# Patient Record
Sex: MALE | Race: WHITE | NOT HISPANIC OR LATINO | Employment: OTHER | URBAN - METROPOLITAN AREA
[De-identification: names, ages, dates, MRNs, and addresses within clinical notes are randomized per-mention and may not be internally consistent; named-entity substitution may affect disease eponyms.]

---

## 2018-03-29 ENCOUNTER — OFFICE VISIT CONVERTED (OUTPATIENT)
Dept: FAMILY MEDICINE CLINIC | Facility: CLINIC | Age: 68
End: 2018-03-29
Attending: FAMILY MEDICINE

## 2018-03-29 ENCOUNTER — CONVERSION ENCOUNTER (OUTPATIENT)
Dept: FAMILY MEDICINE CLINIC | Facility: CLINIC | Age: 68
End: 2018-03-29

## 2018-09-27 ENCOUNTER — CONVERSION ENCOUNTER (OUTPATIENT)
Dept: FAMILY MEDICINE CLINIC | Facility: CLINIC | Age: 68
End: 2018-09-27

## 2018-09-27 ENCOUNTER — OFFICE VISIT CONVERTED (OUTPATIENT)
Dept: FAMILY MEDICINE CLINIC | Facility: CLINIC | Age: 68
End: 2018-09-27
Attending: FAMILY MEDICINE

## 2019-02-18 ENCOUNTER — HOSPITAL ENCOUNTER (OUTPATIENT)
Dept: FAMILY MEDICINE CLINIC | Facility: CLINIC | Age: 69
Discharge: HOME | End: 2019-02-18
Attending: FAMILY MEDICINE

## 2019-02-18 LAB
CHOLEST SERPL-MCNC: 150 MG/DL (ref 107–200)
CHOLEST/HDLC SERPL: 3.5 {RATIO} (ref 3–6)
HDLC SERPL-MCNC: 43 MG/DL (ref 40–60)
LDLC SERPL CALC-MCNC: 85 MG/DL (ref 70–100)
TRIGL SERPL-MCNC: 109 MG/DL (ref 40–150)
VLDLC SERPL-MCNC: 22 MG/DL (ref 5–37)

## 2019-07-16 ENCOUNTER — CONVERSION ENCOUNTER (OUTPATIENT)
Dept: FAMILY MEDICINE CLINIC | Facility: CLINIC | Age: 69
End: 2019-07-16

## 2019-07-16 ENCOUNTER — HOSPITAL ENCOUNTER (OUTPATIENT)
Dept: FAMILY MEDICINE CLINIC | Facility: CLINIC | Age: 69
Discharge: HOME | End: 2019-07-16
Attending: FAMILY MEDICINE

## 2019-07-16 ENCOUNTER — OFFICE VISIT CONVERTED (OUTPATIENT)
Dept: FAMILY MEDICINE CLINIC | Facility: CLINIC | Age: 69
End: 2019-07-16
Attending: FAMILY MEDICINE

## 2019-07-16 LAB
FOLATE SERPL-MCNC: 11.5 NG/ML (ref 4.8–20)
TSH SERPL-ACNC: 2.76 M[IU]/L (ref 0.27–4.2)
VIT B12 SERPL-MCNC: 482 PG/ML (ref 211–911)

## 2019-08-01 ENCOUNTER — HOSPITAL ENCOUNTER (OUTPATIENT)
Dept: MRI IMAGING | Facility: HOSPITAL | Age: 69
Discharge: HOME | End: 2019-08-01
Attending: FAMILY MEDICINE

## 2019-08-26 ENCOUNTER — CONVERSION ENCOUNTER (OUTPATIENT)
Dept: FAMILY MEDICINE CLINIC | Facility: CLINIC | Age: 69
End: 2019-08-26

## 2019-08-26 ENCOUNTER — OFFICE VISIT CONVERTED (OUTPATIENT)
Dept: FAMILY MEDICINE CLINIC | Facility: CLINIC | Age: 69
End: 2019-08-26
Attending: FAMILY MEDICINE

## 2019-10-03 ENCOUNTER — HOSPITAL ENCOUNTER (OUTPATIENT)
Dept: URGENT CARE | Facility: CLINIC | Age: 69
Discharge: HOME | End: 2019-10-03

## 2019-10-07 ENCOUNTER — OFFICE VISIT CONVERTED (OUTPATIENT)
Dept: FAMILY MEDICINE CLINIC | Facility: CLINIC | Age: 69
End: 2019-10-07
Attending: FAMILY MEDICINE

## 2019-12-05 ENCOUNTER — HOSPITAL ENCOUNTER (OUTPATIENT)
Dept: FAMILY MEDICINE CLINIC | Facility: CLINIC | Age: 69
Discharge: HOME | End: 2019-12-05
Attending: FAMILY MEDICINE

## 2019-12-05 ENCOUNTER — OFFICE VISIT CONVERTED (OUTPATIENT)
Dept: FAMILY MEDICINE CLINIC | Facility: CLINIC | Age: 69
End: 2019-12-05
Attending: FAMILY MEDICINE

## 2019-12-05 LAB — PSA SERPL-MCNC: 3.26 NG/ML (ref 0–4)

## 2019-12-06 LAB
CONV HEPATITIS C AB WITH REFLEX TO CONFIRMATION: <0.1 S/CO RATIO (ref 0–0.9)
CONV HEPATITIS COMMENT: NORMAL

## 2020-01-03 ENCOUNTER — HOSPITAL ENCOUNTER (OUTPATIENT)
Dept: GENERAL RADIOLOGY | Facility: HOSPITAL | Age: 70
Discharge: HOME | End: 2020-01-03
Attending: NURSE PRACTITIONER

## 2020-01-03 ENCOUNTER — OFFICE VISIT CONVERTED (OUTPATIENT)
Dept: FAMILY MEDICINE CLINIC | Facility: CLINIC | Age: 70
End: 2020-01-03
Attending: NURSE PRACTITIONER

## 2020-03-02 ENCOUNTER — CONVERSION ENCOUNTER (OUTPATIENT)
Dept: FAMILY MEDICINE CLINIC | Facility: CLINIC | Age: 70
End: 2020-03-02

## 2020-03-02 ENCOUNTER — OFFICE VISIT CONVERTED (OUTPATIENT)
Dept: FAMILY MEDICINE CLINIC | Facility: CLINIC | Age: 70
End: 2020-03-02
Attending: FAMILY MEDICINE

## 2020-10-06 ENCOUNTER — OFFICE VISIT CONVERTED (OUTPATIENT)
Dept: FAMILY MEDICINE CLINIC | Facility: CLINIC | Age: 70
End: 2020-10-06
Attending: FAMILY MEDICINE

## 2020-10-06 ENCOUNTER — CONVERSION ENCOUNTER (OUTPATIENT)
Dept: FAMILY MEDICINE CLINIC | Facility: CLINIC | Age: 70
End: 2020-10-06

## 2021-05-13 NOTE — PROGRESS NOTES
Progress Note      Patient Name: Lj Crenshaw   Patient ID: [id number]   Sex: Male   YOB: 1950    Primary Care Provider: Scott Hernandez III MD    Visit Date: October 6, 2020    Provider: Scott Hernandez III MD   Location: [facility]   Location Address: [address] [place] [address]  [id number]   Location Phone: [phone]          Chief Complaint  · right foot swelling on and off      History Of Present Illness  Lj Crenshaw is a 70 year old /White male who presents for evaluation and treatment of: right foot swelling on and off, has occasional sore on the side of the foot if he walks a lot. Pt already has a right foot drop and weakness in the right lower leg so he doesn't have good use of the right foot since his knee replacement.      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  Has varicose veins on both sides and has some swelling there is no pain in the calves.    Review of systems     musculoskeletal right foot swelling.  But is been doing the surgery.  He an ankle brace may help.  But needs to be a strep on.  Not he is already tried a couple it did not work.  Could use compression hose.  But he is he is going to continue to have swelling some swelling in his ankles.  Cardiovascular no chest pain the patient is not taking statin needs to take the Crestor decrease cardiac heart attack and stroke.  Patient still reluctant to do that.  I did not really bother him much.  States states he may have felt lightheaded while he was on it but that was not the statin doing that.    Physical exam     blood pressure is 132/70 temp 97.8 heart rate 93 pulse ox 98 weight is 261 this is a 19 pound weight loss.  General patient appears in good health looks better than he has before.  Cardiovascular 2/6 systolic ejection murmur was checked out for that back in 2015 with an echo was negative.     Respiratory no increased work of breathing lungs clinical bilaterally no rales no rhonchi no wheezes  Feet yes patient has a right sided foot drop.  Only has a little bit more edema than the other side does have varicosities no tenderness whatsoever in either calf.  Patient uses his cane.  And uses his cane to his advantage.     Assessment     #1 right foot swollen due to right leg and right peroneal nerve damage with a foot drop.   #2 patient is lost weight with the keto diet but is not taking his Crestor needs to start taking it.    #3 refuses flu shot.    Plan     recheck as needed ankle brace Velcro on.  Can restart his Crestor he has that at home 40 million       Past Medical History  Disease Name Date Onset Notes   Advance directive declined by patient 03/02/2020 --    Bilateral Lower Extremity Pedal Edema, right &gt; left 07/22/2016 --    Essential hypertension 09/27/2018 --    Fatigue 09/27/2018 --    High cholesterol --  --    Limb Swelling --  --    Refused influenza vaccine 03/02/2020 --    Screening for colon cancer 2013 Per Dr. Vigil, Diverticulitis, repeat in 10 yrs         Past Surgical History  Procedure Name Date Notes   Colonoscopy 2013 Per Dr. Vigil, Diverticulitis, repeat in 10 yrs   EYE SURGERY --  --    Joint Surgery --  --    Knee repair 2011 Dr Sean   Knee surgery 2011 --          Medication List  Name Date Started Instructions   Crestor 40 mg oral tablet 09/27/2018 take 1 tablet (40 mg) by oral route once daily for 90 days   lisinopril-hydrochlorothiazide 10-12.5 mg oral tablet 01/30/2020 take 1 tablet by oral route once daily for 90 days   Omega-3 350 mg-235 mg- 90 mg-597 mg oral capsule,delayed release(DR/EC)  --    promethazine-DM 6.25-15 mg/5 mL oral syrup 01/03/2020 take 5 milliliters by oral route every 4 hours         Allergy List  Allergen Name Date Reaction Notes   NO KNOWN DRUG ALLERGIES --  --  --        Allergies Reconciled  Family Medical History  Disease Name Relative/Age  "Notes   Stroke Father/   Father   Heart Disease Brother/   Brother   Cancer, Unspecified Mother/  Sister/   Mother; Sister   Osteoporosis Mother/   Mother         Social History  Finding Status Start/Stop Quantity Notes   Active but no formal exercise --  --/-- --  --    Advance directive declined by patient --  --/-- --  --    Alcohol Use Current some day --/-- --  occasionally drinks, has been drinking for 31 or more years    --  --/-- --  --    Recreational Drug Use Never --/-- --  no   Tobacco Former 31/64 1 ppd 06/28/2017 - smokes 1ppd          Immunizations  NameDate Admin Mfg Trade Name Lot Number Route Inj VIS Given VIS Publication   Hepatitis A10/16/2018 NE Not Entered  NE NE 11/29/2018    Comments:    InfluenzaRefused 10/07/2019 NE Not Entered  NE NE     Comments: Patient refused   Wmuugmage7143/19/2018 NE Not Entered  NE NE 11/29/2018    Comments:    Prevnar 1308/31/2017 [facility] PREVNAR 13 [id number] IM LD 08/31/2017 11/05/2015   Comments:    Ogfokurb55/06/2019 NE Shingrix  IM RA 08/09/2019    Comments: given at [facility]   Njzqmklz37/30/2019 NE Not Entered  IM LA 04/02/2019    Comments: shringrix given at [facility]   Tdap01/10/2013 NE Not Entered  NE NE 11/29/2018    Comments:          Review of Systems  · Constitutional  o * See HPI  · Eyes  o * See HPI  · HENT  o * See HPI  · Breasts  o * See HPI  · Cardiovascular  o * See HPI  · Respiratory  o * See HPI  · Gastrointestinal  o * See HPI  · Genitourinary  o * See HPI  · Integument  o * See HPI  · Neurologic  o * See HPI  · Musculoskeletal  o * See HPI  · Endocrine  o * See HPI  · Psychiatric  o * See HPI  · Heme-Lymph  o * See HPI  · Allergic-Immunologic  o * See HPI      Vitals  Date Time BP Position Site L\R Cuff Size HR RR TEMP (F) WT  HT  BMI kg/m2 BSA m2 O2 Sat FR L/min FiO2 HC       10/06/2020 02:18 /70 Sitting    93 - R  97.8 261lbs 0oz 6'  6\" 30.16 2.55 98 %  21%              Assessment  · Pedal edema     782.3/R60.0  · Bilateral Lower Extremity " Pedal Edema, right > left     782.3/R60.0  · Refused influenza vaccine     V64.06/Z28.21  · Varicose vein of leg     454.9/I83.90  · Chronic Right foot drop     736.79/M21.371  · Statin declined     V64.2/Z53.20  · Injury of right peroneal nerve, subsequent encounter       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   o Take all medications as prescribed/directed.  o Patient instructed/educated on their diet and exercise program.  o Patient was educated/instructed on their diagnosis, treatment and medications prior to discharge from the clinic today.  o Bring all medicines with their bottles to each office visit.  o Time spent with the patient was 20 minutes, more than 50% face to face.  o Chronic conditions reviewed and taken into consideration for today's treatment plan.  o Flu vaccine declined.  o Discussed Covid-19 precautions including, but not limited to, social distancing, avoid touching your face, and hand washing.             Electronically Signed by: Charmaine Holman, -Author on October 6, 2020 03:07:24 PM  Electronically Co-signed by: Scott Hernandez III MD -Reviewer on October 6, 2020 03:17:56 PM

## 2021-05-14 VITALS
DIASTOLIC BLOOD PRESSURE: 70 MMHG | SYSTOLIC BLOOD PRESSURE: 132 MMHG | HEART RATE: 93 BPM | HEIGHT: 78 IN | WEIGHT: 261 LBS | TEMPERATURE: 97.8 F | OXYGEN SATURATION: 98 % | BODY MASS INDEX: 30.2 KG/M2

## 2021-05-15 VITALS
DIASTOLIC BLOOD PRESSURE: 80 MMHG | WEIGHT: 280 LBS | SYSTOLIC BLOOD PRESSURE: 142 MMHG | HEIGHT: 78 IN | BODY MASS INDEX: 32.4 KG/M2

## 2021-05-15 VITALS
SYSTOLIC BLOOD PRESSURE: 143 MMHG | WEIGHT: 279 LBS | TEMPERATURE: 98 F | RESPIRATION RATE: 19 BRPM | HEART RATE: 93 BPM | BODY MASS INDEX: 32.28 KG/M2 | OXYGEN SATURATION: 95 % | DIASTOLIC BLOOD PRESSURE: 78 MMHG | HEIGHT: 78 IN

## 2021-05-15 VITALS
TEMPERATURE: 98.4 F | DIASTOLIC BLOOD PRESSURE: 80 MMHG | HEIGHT: 78 IN | SYSTOLIC BLOOD PRESSURE: 134 MMHG | HEART RATE: 86 BPM | RESPIRATION RATE: 12 BRPM | OXYGEN SATURATION: 95 %

## 2021-05-15 VITALS
HEIGHT: 78 IN | SYSTOLIC BLOOD PRESSURE: 120 MMHG | BODY MASS INDEX: 31.93 KG/M2 | WEIGHT: 276 LBS | DIASTOLIC BLOOD PRESSURE: 80 MMHG

## 2021-05-15 VITALS
WEIGHT: 280 LBS | HEIGHT: 78 IN | TEMPERATURE: 98.4 F | DIASTOLIC BLOOD PRESSURE: 70 MMHG | SYSTOLIC BLOOD PRESSURE: 112 MMHG | BODY MASS INDEX: 32.4 KG/M2

## 2021-05-15 VITALS
SYSTOLIC BLOOD PRESSURE: 130 MMHG | BODY MASS INDEX: 32.4 KG/M2 | DIASTOLIC BLOOD PRESSURE: 72 MMHG | HEIGHT: 78 IN | WEIGHT: 280 LBS

## 2021-05-16 VITALS
DIASTOLIC BLOOD PRESSURE: 80 MMHG | HEIGHT: 78 IN | BODY MASS INDEX: 33.78 KG/M2 | WEIGHT: 292 LBS | SYSTOLIC BLOOD PRESSURE: 150 MMHG

## 2021-05-16 VITALS
DIASTOLIC BLOOD PRESSURE: 82 MMHG | HEART RATE: 80 BPM | OXYGEN SATURATION: 100 % | WEIGHT: 277 LBS | HEIGHT: 78 IN | SYSTOLIC BLOOD PRESSURE: 138 MMHG | RESPIRATION RATE: 12 BRPM | BODY MASS INDEX: 32.05 KG/M2

## 2021-05-27 ENCOUNTER — OFFICE VISIT (OUTPATIENT)
Dept: ORTHOPEDIC SURGERY | Facility: CLINIC | Age: 71
End: 2021-05-27

## 2021-05-27 VITALS — HEIGHT: 78 IN | BODY MASS INDEX: 27.19 KG/M2 | WEIGHT: 235 LBS

## 2021-05-27 DIAGNOSIS — Z96.651 STATUS POST RIGHT KNEE REPLACEMENT: ICD-10-CM

## 2021-05-27 DIAGNOSIS — R52 PAIN: Primary | ICD-10-CM

## 2021-05-27 PROCEDURE — 99203 OFFICE O/P NEW LOW 30 MIN: CPT | Performed by: ORTHOPAEDIC SURGERY

## 2021-05-27 PROCEDURE — 73562 X-RAY EXAM OF KNEE 3: CPT | Performed by: ORTHOPAEDIC SURGERY

## 2021-05-27 PROCEDURE — 73502 X-RAY EXAM HIP UNI 2-3 VIEWS: CPT | Performed by: ORTHOPAEDIC SURGERY

## 2021-05-27 RX ORDER — PREDNISOLONE ACETATE 10 MG/ML
SUSPENSION/ DROPS OPHTHALMIC
COMMUNITY
Start: 2021-02-23 | End: 2021-11-16

## 2021-05-27 RX ORDER — LISINOPRIL AND HYDROCHLOROTHIAZIDE 12.5; 1 MG/1; MG/1
TABLET ORAL
COMMUNITY
Start: 2021-04-21 | End: 2021-07-26

## 2021-05-27 NOTE — PROGRESS NOTES
"willowPatient: Lj Crenshaw  YOB: 1950 71 y.o. male  Medical Record Number: [id number]    Chief Complaints:   Chief Complaint   Patient presents with   • Right Hip - Establish Care, Pain   • Right Knee - Establish Care, Pain       History of Present Illness:Lj Crenshaw is a 71 y.o. male who presents with complaints of right greater than left leg weakness instability difficulty with balance and coordination.  He reports having had a right total knee replacement in 2010 by Dr. Rene Hester in [facility].  He states over the years he developed right foot drop.  He now uses a walker even around the house and has difficulty with balance and coordination with frequent falls.  He does not have much in the way of pain in the knee or the right hip.  He is here to have his knee and hip evaluated to make sure they are not the source of his balance issues and falls.    Allergies: No Known Allergies    Medications:   Current Outpatient Medications   Medication Sig Dispense Refill   • lisinopril-hydrochlorothiazide (PRINZIDE,ZESTORETIC) 10-12.5 MG per tablet      • Olopatadine HCl (PATADAY OP) Apply  to eye(s) as directed by provider.     • bisoprolol (ZEBETA) 5 MG tablet Take 1 tablet by mouth Daily. 30 tablet 6   • Omega-3 Fatty Acids (FISH OIL) 1000 MG capsule capsule Take  by mouth Daily With Breakfast.     • prednisoLONE acetate (PRED FORTE) 1 % ophthalmic suspension        No current facility-administered medications for this visit.         The following portions of the patient's history were reviewed and updated as appropriate: allergies, current medications, past family history, past medical history, past social history, past surgical history and problem list.    Review of Systems:   A 14 point review of systems was performed. All systems negative except pertinent positives/negative listed in HPI above    Physical Exam:   Vitals:    05/27/21 0824   Weight: 107 kg (235 lb)   Height: 198.1 cm (78\") "       General: A and O x 3, ASA, NAD    SCLERA:    Normal    DENTITION:   Normal   He walks with an ataxic gait with use of a walker.  His motor testing demonstrates diffuse lower extremity weakness right greater than left.  He has marked weakness of the right foot dorsiflexors more so than left.  Also considerable weakness of hip flexor and extensors at the knee.  He is intact to light touch.  There is no evidence of varus valgus or anterior posterior instability of the right knee replacement.  He there is a well-healed midline incision there is no evidence of joint effusion.  The hip shows no pain with flexion internal rotation of the negative Stinchfield test although it is quite weak.  There is no irritability of the hip with motion.  There is no tenderness about the greater trochanters.      Radiology:  Xrays 3views right knee (ap,lateral, sunrise) were ordered and reviewed for evaluation of knee pain demonstrating a well positioned knee replacement without evidence of wear, loosening or osteolysis.  There are no previous films for comparison.    X-rays 2 views the right hip AP lateral were ordered and reviewed which show no significant arthritic change.  There is extensive degenerative change and degenerative scoliosis of the lumbar spine visualized on the AP pelvis view.  There are no previous films for x-ray.     Assessment/Plan: Ataxia and right greater than left leg weakness I do not think as a result of a knee or hip problem.  I think there is a more proximal source likely neuromuscular in nature.  He has seen a neurologist in a town and has had some work-up but he is not sure what.  I do not want a repeat create before so he and his daughter go to try them find out what studies have been performed and have given them some names for neurologist in town.  I be happy to see him back at any point in the future should he have pain of knee or hip.      Mal Quigley MD  5/27/2021

## 2021-07-26 RX ORDER — LISINOPRIL AND HYDROCHLOROTHIAZIDE 12.5; 1 MG/1; MG/1
TABLET ORAL
Qty: 90 TABLET | Refills: 0 | Status: SHIPPED | OUTPATIENT
Start: 2021-07-26 | End: 2021-10-26

## 2021-10-26 RX ORDER — LISINOPRIL AND HYDROCHLOROTHIAZIDE 12.5; 1 MG/1; MG/1
TABLET ORAL
Qty: 30 TABLET | Refills: 0 | Status: SHIPPED | OUTPATIENT
Start: 2021-10-26 | End: 2021-11-16 | Stop reason: SDUPTHER

## 2021-11-16 ENCOUNTER — TELEPHONE (OUTPATIENT)
Dept: FAMILY MEDICINE CLINIC | Facility: CLINIC | Age: 71
End: 2021-11-16

## 2021-11-16 ENCOUNTER — OFFICE VISIT (OUTPATIENT)
Dept: FAMILY MEDICINE CLINIC | Facility: CLINIC | Age: 71
End: 2021-11-16

## 2021-11-16 VITALS
DIASTOLIC BLOOD PRESSURE: 74 MMHG | OXYGEN SATURATION: 96 % | BODY MASS INDEX: 30.08 KG/M2 | HEIGHT: 78 IN | TEMPERATURE: 97.7 F | WEIGHT: 260 LBS | HEART RATE: 89 BPM | SYSTOLIC BLOOD PRESSURE: 118 MMHG

## 2021-11-16 DIAGNOSIS — I10 ESSENTIAL HYPERTENSION: ICD-10-CM

## 2021-11-16 DIAGNOSIS — E78.2 MIXED HYPERLIPIDEMIA: ICD-10-CM

## 2021-11-16 DIAGNOSIS — Z79.899 MEDICATION MANAGEMENT: ICD-10-CM

## 2021-11-16 DIAGNOSIS — Z13.6 ENCOUNTER FOR LIPID SCREENING FOR CARDIOVASCULAR DISEASE: ICD-10-CM

## 2021-11-16 DIAGNOSIS — Z13.220 ENCOUNTER FOR LIPID SCREENING FOR CARDIOVASCULAR DISEASE: ICD-10-CM

## 2021-11-16 DIAGNOSIS — Z11.59 NEED FOR HEPATITIS C SCREENING TEST: ICD-10-CM

## 2021-11-16 DIAGNOSIS — Z00.00 ROUTINE ADULT HEALTH MAINTENANCE: Primary | ICD-10-CM

## 2021-11-16 DIAGNOSIS — Z12.5 SCREENING PSA (PROSTATE SPECIFIC ANTIGEN): ICD-10-CM

## 2021-11-16 PROBLEM — M54.10 POLYRADICULOPATHY: Status: ACTIVE | Noted: 2021-10-20

## 2021-11-16 LAB
BILIRUB BLD-MCNC: NEGATIVE MG/DL
CHOLEST SERPL-MCNC: 285 MG/DL (ref 0–200)
CLARITY, POC: CLEAR
COLOR UR: YELLOW
EXPIRATION DATE: ABNORMAL
GLUCOSE UR STRIP-MCNC: NEGATIVE MG/DL
HCV AB SER DONR QL: NORMAL
HDLC SERPL-MCNC: 51 MG/DL (ref 40–60)
KETONES UR QL: NEGATIVE
LDLC SERPL CALC-MCNC: 203 MG/DL (ref 0–100)
LDLC/HDLC SERPL: 3.95 {RATIO}
LEUKOCYTE EST, POC: ABNORMAL
Lab: ABNORMAL
NITRITE UR-MCNC: NEGATIVE MG/ML
PH UR: 5.5 [PH] (ref 5–8)
PROT UR STRIP-MCNC: NEGATIVE MG/DL
PSA SERPL-MCNC: 3.93 NG/ML (ref 0–4)
RBC # UR STRIP: NEGATIVE /UL
SP GR UR: 1.01 (ref 1–1.03)
TRIGL SERPL-MCNC: 163 MG/DL (ref 0–150)
UROBILINOGEN UR QL: NORMAL
VLDLC SERPL-MCNC: 31 MG/DL (ref 5–40)

## 2021-11-16 PROCEDURE — 86803 HEPATITIS C AB TEST: CPT | Performed by: FAMILY MEDICINE

## 2021-11-16 PROCEDURE — 80061 LIPID PANEL: CPT | Performed by: FAMILY MEDICINE

## 2021-11-16 PROCEDURE — 99214 OFFICE O/P EST MOD 30 MIN: CPT | Performed by: FAMILY MEDICINE

## 2021-11-16 PROCEDURE — 81003 URINALYSIS AUTO W/O SCOPE: CPT | Performed by: FAMILY MEDICINE

## 2021-11-16 PROCEDURE — 36415 COLL VENOUS BLD VENIPUNCTURE: CPT | Performed by: FAMILY MEDICINE

## 2021-11-16 PROCEDURE — G0103 PSA SCREENING: HCPCS | Performed by: FAMILY MEDICINE

## 2021-11-16 RX ORDER — LISINOPRIL AND HYDROCHLOROTHIAZIDE 12.5; 1 MG/1; MG/1
1 TABLET ORAL DAILY
Qty: 90 TABLET | Refills: 3 | Status: SHIPPED | OUTPATIENT
Start: 2021-11-16 | End: 2021-11-29

## 2021-11-16 RX ORDER — CYANOCOBALAMIN 1000 UG/ML
INJECTION, SOLUTION INTRAMUSCULAR; SUBCUTANEOUS
COMMUNITY
Start: 2021-09-08

## 2021-11-16 NOTE — PROGRESS NOTES
Chief Complaint  Med Management (yearly routine check up and labs)    SUBJECTIVE  Lj Crenshaw presents to [facility]  71-year-old with right foot drop unstable gait uses regularly Rollator hypertension hyperlipidemia does not want to use statin she is now taking B12 no recent falls    PAST MEDICAL HISTORY  No Known Allergies     Past Surgical History:   • EYE SURGERY   • KNEE SURGERY       Social History     Tobacco Use   • Smoking status: Former Smoker     Packs/day: 1.00     Years: 34.00     Pack years: 34.00     Start date:      Quit date:      Years since quittin.8   • Smokeless tobacco: Never Used   Substance Use Topics   • Alcohol use: Yes     Alcohol/week: 1.0 standard drink     Types: 1 Cans of beer per week     Comment: only if goes out to eat       Family History   Problem Relation Age of Onset   • Heart disease Brother         Health Maintenance Due   Topic Date Due   • TDAP/TD VACCINES (1 - Tdap) Never done   • HEPATITIS C SCREENING  Never done   • ANNUAL WELLNESS VISIT  2021   • LIPID PANEL  2021   • COVID-19 Vaccine (3 - Pfizer booster) 10/08/2021      Last Completed Colonoscopy          COLORECTAL CANCER SCREENING (COLONOSCOPY - Every 10 Years) Next due on 10/28/2023    10/28/2013  COLONOSCOPY (Done - normal repeat 10 years dr jaime)                REVIEW OF SYSTEMS  Cardiovascular no chest pain no palpitations discussed taking Crestor  Respiratory patient only bumigarettes until about  that was about a pack a day stopped in  this would be 11 pack years plus what ever he was just bum cigarettes but which might add up to another pack year 2 so he would only be 14 and certainly not more than 15 pack years not enough to screen  No GI patient had a colonoscopy in  not due again until   Neurologic see neurologist in [place] now is taking B12 has not seen much difference in his walking uses a Rollator    OBJECTIVE  Vitals:  "   11/16/21 1238   BP: 118/74   Pulse: 89   Temp: 97.7 °F (36.5 °C)   SpO2: 96%   Weight: 118 kg (260 lb)   Height: 198.1 cm (78\")     Body mass index is 30.05 kg/m².    PHYSICAL EXAM  General no distress  Lungs clear and equal bilaterally  Cardiovascular regular rhythm no murmur  Abdomen soft nontender no hepatosplenomegaly  Rectal 1+ prostate hypertrophy no asymmetry no nodules  Neurologic walks with a Rollator speech is normal mentation is normal obvious right leg weakness with drop    ASSESSMENT & PLAN  Diagnoses and all orders for this visit:    1. Routine adult health maintenance (Primary)  -     Lipid Panel  -     PSA Screen  -     POC Urinalysis Dipstick, Automated    2. Screening PSA (prostate specific antigen)  -     PSA Screen    3. Encounter for lipid screening for cardiovascular disease  -     Lipid Panel    4. Mixed hyperlipidemia  -     Lipid Panel    5. Essential hypertension    6. Need for hepatitis C screening test  -     Hepatitis C Antibody    7. Medication management  -     Lipid Panel  -     POC Urinalysis Dipstick, Automated    Other orders  -     lisinopril-hydrochlorothiazide (PRINZIDE,ZESTORETIC) 10-12.5 MG per tablet; Take 1 tablet by mouth Daily.  Dispense: 90 tablet; Refill: 3      Hypertension well controlled foot drop but seeing a neurologist #2 smoking only 11 to 12 pack years prostate screen is done PSA is pending history of hyperlipidemia lipid is done refuses flu shot            Patient was given instructions and counseling regarding his condition or for health maintenance advice. Please see specific information pulled into the AVS if appropriate.   "

## 2021-11-16 NOTE — TELEPHONE ENCOUNTER
Hub to share: called and left voicemail message he is due a low dose ct scan and because he is a former smoker need to know if this is something he willing to be set up for per Dr Hernandez.

## 2021-11-16 NOTE — TELEPHONE ENCOUNTER
Patient only has about 12 to 15 pack years of smoking so he does not need a screen discussed with the patient his history

## 2021-11-19 ENCOUNTER — TELEPHONE (OUTPATIENT)
Dept: FAMILY MEDICINE CLINIC | Facility: CLINIC | Age: 71
End: 2021-11-19

## 2021-11-19 NOTE — TELEPHONE ENCOUNTER
Caller: Lj Crenshaw    Relationship: Self    Best call back number: [phone]    Requested Prescriptions: [facility]     Pharmacy where request should be sent: [facility] [address] [place] [address], [place] - [address] AT [facility] [address] - [phone] [facility] [phone] FX     Additional details provided by patient: PATIENT STATED THAT HE IS NEEDING THE GENERIC VERSION AND THAT HIS PCP LEFT HIM A VOICEMAIL ABOUT THE MEDICATION     Does the patient have less than a 3 day supply:  [] Yes  [] No

## 2021-11-22 ENCOUNTER — TELEPHONE (OUTPATIENT)
Dept: FAMILY MEDICINE CLINIC | Facility: CLINIC | Age: 71
End: 2021-11-22

## 2021-11-22 RX ORDER — ATORVASTATIN CALCIUM 40 MG/1
40 TABLET, FILM COATED ORAL DAILY
Qty: 90 TABLET | Refills: 3 | Status: SHIPPED | OUTPATIENT
Start: 2021-11-22 | End: 2021-11-23

## 2021-11-22 NOTE — TELEPHONE ENCOUNTER
Caller: Lj Crenshaw    Relationship: Self    Best call back number: [phone]     Requested Prescriptions: GENERIC FOR [facility]       Pharmacy where request should be sent: [facility] [address] [place] [address], [place] - [address] AT [facility] [address] - [phone] [facility] [phone] FX     Additional details provided by patient: PATIENT WANTS TO KNOW IF IT IS OK FOR HIM TO TAKE THIS MEDICATION FROM 2018.    PLEASE CALL AND ADVISE    Does the patient have less than a 3 day supply:  [x] Yes  [] No    Dany REGALADO Rep   11/22/21 15:11 EST

## 2021-11-23 ENCOUNTER — TELEPHONE (OUTPATIENT)
Dept: FAMILY MEDICINE CLINIC | Facility: CLINIC | Age: 71
End: 2021-11-23

## 2021-11-23 RX ORDER — ROSUVASTATIN CALCIUM 40 MG/1
40 TABLET, COATED ORAL DAILY
Qty: 90 TABLET | Refills: 3 | Status: SHIPPED | OUTPATIENT
Start: 2021-11-23 | End: 2023-01-09 | Stop reason: SDUPTHER

## 2021-11-23 NOTE — TELEPHONE ENCOUNTER
Called and instructed patient if the medication has been kept in a cool, dry place.  Patient states that is has been.

## 2021-11-23 NOTE — TELEPHONE ENCOUNTER
Caller: Lj Crenshaw    Relationship to patient: Self    Best call back number: [phone]     Patient is needing: PATIENT ASKED TO SPEAK WITH CLINICAL STAFF IN THIS OFFICE REGARDING SWITCHING PHARMACIES FOR HIS PRESCRIPTIONS. WARM TRANSFER UNSUCCESSFUL. PLEASE CALL THIS PATIENT AND ADVISE.

## 2021-11-29 RX ORDER — LISINOPRIL AND HYDROCHLOROTHIAZIDE 12.5; 1 MG/1; MG/1
TABLET ORAL
Qty: 90 TABLET | Refills: 3 | Status: SHIPPED | OUTPATIENT
Start: 2021-11-29 | End: 2021-12-22 | Stop reason: SDUPTHER

## 2021-12-22 RX ORDER — LISINOPRIL AND HYDROCHLOROTHIAZIDE 12.5; 1 MG/1; MG/1
1 TABLET ORAL DAILY
Qty: 90 TABLET | Refills: 3 | Status: SHIPPED | OUTPATIENT
Start: 2021-12-22 | End: 2023-01-09

## 2021-12-22 NOTE — TELEPHONE ENCOUNTER
Caller: Lj Crenshaw    Relationship: Self    Best call back number: [phone]    Requested Prescriptions:   Requested Prescriptions     Pending Prescriptions Disp Refills   • lisinopril-hydrochlorothiazide (PRINZIDE,ZESTORETIC) 10-12.5 MG per tablet 90 tablet 3     Sig: Take 1 tablet by mouth Daily.        Pharmacy where request should be sent: [facility], [address] AT [facility] [address] - [phone] [facility] [phone] FX     Additional details provided by patient: PATIENT IS ASKING FOR A 90 DAY SUPPLY ON MEDICATION    Does the patient have less than a 3 day supply:  [x] Yes  [] No    Dany Lee Rep   12/22/21 11:03 EST

## 2022-02-11 ENCOUNTER — TRANSCRIBE ORDERS (OUTPATIENT)
Dept: LAB | Facility: HOSPITAL | Age: 72
End: 2022-02-11

## 2022-02-11 ENCOUNTER — LAB (OUTPATIENT)
Dept: LAB | Facility: HOSPITAL | Age: 72
End: 2022-02-11

## 2022-02-11 DIAGNOSIS — G60.9 IDIOPATHIC PERIPHERAL NEUROPATHY: Primary | ICD-10-CM

## 2022-02-11 PROCEDURE — 83825 ASSAY OF MERCURY: CPT

## 2022-02-11 PROCEDURE — 81050 URINALYSIS VOLUME MEASURE: CPT

## 2022-02-11 PROCEDURE — 83655 ASSAY OF LEAD: CPT

## 2022-02-11 PROCEDURE — 82175 ASSAY OF ARSENIC: CPT

## 2022-02-11 PROCEDURE — 82570 ASSAY OF URINE CREATININE: CPT

## 2022-02-15 LAB
ARSENIC 24H UR-MCNC: NORMAL UG/L (ref 0–9)
ARSENIC/CREAT UR: NORMAL
CREAT UR-MCNC: 0.85 G/L (ref 0.3–3)
LEAD 24H UR-MCNC: NORMAL UG/L (ref 0–49)
MERCURY 24H UR-MCNC: NORMAL UG/L (ref 0–19)

## 2023-01-07 DIAGNOSIS — I10 ESSENTIAL HYPERTENSION: Primary | ICD-10-CM

## 2023-01-09 ENCOUNTER — TELEPHONE (OUTPATIENT)
Dept: FAMILY MEDICINE CLINIC | Facility: CLINIC | Age: 73
End: 2023-01-09
Payer: COMMERCIAL

## 2023-01-09 DIAGNOSIS — E78.2 MIXED HYPERLIPIDEMIA: Primary | ICD-10-CM

## 2023-01-09 RX ORDER — ROSUVASTATIN CALCIUM 40 MG/1
40 TABLET, COATED ORAL DAILY
Qty: 90 TABLET | Refills: 0 | Status: SHIPPED | OUTPATIENT
Start: 2023-01-09 | End: 2023-02-01 | Stop reason: SDUPTHER

## 2023-01-09 RX ORDER — LISINOPRIL AND HYDROCHLOROTHIAZIDE 12.5; 1 MG/1; MG/1
TABLET ORAL
Qty: 90 TABLET | Refills: 0 | Status: SHIPPED | OUTPATIENT
Start: 2023-01-09 | End: 2023-02-01 | Stop reason: SDUPTHER

## 2023-01-09 NOTE — TELEPHONE ENCOUNTER
Caller: Lj Crenshaw    Relationship: Self    Best call back number: [phone]    Requested Prescriptions:   Requested Prescriptions     Pending Prescriptions Disp Refills   • rosuvastatin (Crestor) 40 MG tablet 90 tablet 3     Sig: Take 1 tablet by mouth Daily. generic        Pharm[facility] where request should be sent[id number] [place] [facility] [address] [facility] [address] - [phone] [facility] [phone] FX     Additional details provided by patient: PATIENT IS ASKING FOR 90 DAY SUPPLY     Does the patient have less than a 3 day supply:  [x] Yes  [] No    Would you like a call back once the refill request has been completed: [] Yes [x] No    If the office needs to give you a call back, can they leave a voicemail: [] Yes [x] No    Lauren Bowser, PCT   01/09/23 09:47 EST

## 2023-02-01 ENCOUNTER — OFFICE VISIT (OUTPATIENT)
Dept: FAMILY MEDICINE CLINIC | Facility: CLINIC | Age: 73
End: 2023-02-01
Payer: MEDICARE

## 2023-02-01 VITALS
WEIGHT: 276 LBS | BODY MASS INDEX: 31.93 KG/M2 | SYSTOLIC BLOOD PRESSURE: 118 MMHG | TEMPERATURE: 98.9 F | OXYGEN SATURATION: 96 % | HEIGHT: 78 IN | HEART RATE: 94 BPM | DIASTOLIC BLOOD PRESSURE: 70 MMHG

## 2023-02-01 DIAGNOSIS — I10 ESSENTIAL HYPERTENSION: ICD-10-CM

## 2023-02-01 DIAGNOSIS — E78.2 MIXED HYPERLIPIDEMIA: ICD-10-CM

## 2023-02-01 DIAGNOSIS — Z13.6 ENCOUNTER FOR LIPID SCREENING FOR CARDIOVASCULAR DISEASE: ICD-10-CM

## 2023-02-01 DIAGNOSIS — Z12.5 SCREENING PSA (PROSTATE SPECIFIC ANTIGEN): ICD-10-CM

## 2023-02-01 DIAGNOSIS — Z00.00 MEDICARE ANNUAL WELLNESS VISIT, SUBSEQUENT: Primary | ICD-10-CM

## 2023-02-01 DIAGNOSIS — Z79.899 MEDICATION MANAGEMENT: ICD-10-CM

## 2023-02-01 DIAGNOSIS — M21.371 FOOT DROP, RIGHT: ICD-10-CM

## 2023-02-01 DIAGNOSIS — Z00.00 LABORATORY EXAM ORDERED AS PART OF ROUTINE GENERAL MEDICAL EXAMINATION: ICD-10-CM

## 2023-02-01 DIAGNOSIS — Z13.220 ENCOUNTER FOR LIPID SCREENING FOR CARDIOVASCULAR DISEASE: ICD-10-CM

## 2023-02-01 DIAGNOSIS — R01.1 SYSTOLIC MURMUR: ICD-10-CM

## 2023-02-01 DIAGNOSIS — M54.10 POLYRADICULOPATHY: ICD-10-CM

## 2023-02-01 LAB
ALBUMIN SERPL-MCNC: 4.2 G/DL (ref 3.5–5.2)
ALBUMIN/GLOB SERPL: 1.8 G/DL
ALP SERPL-CCNC: 68 U/L (ref 39–117)
ALT SERPL W P-5'-P-CCNC: 26 U/L (ref 1–41)
ANION GAP SERPL CALCULATED.3IONS-SCNC: 10 MMOL/L (ref 5–15)
AST SERPL-CCNC: 25 U/L (ref 1–40)
BASOPHILS # BLD AUTO: 0.03 10*3/MM3 (ref 0–0.2)
BASOPHILS NFR BLD AUTO: 0.5 % (ref 0–1.5)
BILIRUB SERPL-MCNC: 0.3 MG/DL (ref 0–1.2)
BUN SERPL-MCNC: 19 MG/DL (ref 8–23)
BUN/CREAT SERPL: 27.9 (ref 7–25)
CALCIUM SPEC-SCNC: 9.6 MG/DL (ref 8.6–10.5)
CHLORIDE SERPL-SCNC: 103 MMOL/L (ref 98–107)
CHOLEST SERPL-MCNC: 137 MG/DL (ref 0–200)
CO2 SERPL-SCNC: 26 MMOL/L (ref 22–29)
CREAT SERPL-MCNC: 0.68 MG/DL (ref 0.76–1.27)
DEPRECATED RDW RBC AUTO: 40.7 FL (ref 37–54)
EGFRCR SERPLBLD CKD-EPI 2021: 100 ML/MIN/1.73
EOSINOPHIL # BLD AUTO: 0.1 10*3/MM3 (ref 0–0.4)
EOSINOPHIL NFR BLD AUTO: 1.7 % (ref 0.3–6.2)
ERYTHROCYTE [DISTWIDTH] IN BLOOD BY AUTOMATED COUNT: 13 % (ref 12.3–15.4)
GLOBULIN UR ELPH-MCNC: 2.4 GM/DL
GLUCOSE SERPL-MCNC: 111 MG/DL (ref 65–99)
HCT VFR BLD AUTO: 44.7 % (ref 37.5–51)
HDLC SERPL-MCNC: 51 MG/DL (ref 40–60)
HGB BLD-MCNC: 15.1 G/DL (ref 13–17.7)
IMM GRANULOCYTES # BLD AUTO: 0.03 10*3/MM3 (ref 0–0.05)
IMM GRANULOCYTES NFR BLD AUTO: 0.5 % (ref 0–0.5)
LDLC SERPL CALC-MCNC: 66 MG/DL (ref 0–100)
LDLC/HDLC SERPL: 1.25 {RATIO}
LYMPHOCYTES # BLD AUTO: 1.5 10*3/MM3 (ref 0.7–3.1)
LYMPHOCYTES NFR BLD AUTO: 25.3 % (ref 19.6–45.3)
MCH RBC QN AUTO: 29.3 PG (ref 26.6–33)
MCHC RBC AUTO-ENTMCNC: 33.8 G/DL (ref 31.5–35.7)
MCV RBC AUTO: 86.8 FL (ref 79–97)
MONOCYTES # BLD AUTO: 0.5 10*3/MM3 (ref 0.1–0.9)
MONOCYTES NFR BLD AUTO: 8.4 % (ref 5–12)
NEUTROPHILS NFR BLD AUTO: 3.77 10*3/MM3 (ref 1.7–7)
NEUTROPHILS NFR BLD AUTO: 63.6 % (ref 42.7–76)
NRBC BLD AUTO-RTO: 0.2 /100 WBC (ref 0–0.2)
PLATELET # BLD AUTO: 212 10*3/MM3 (ref 140–450)
PMV BLD AUTO: 9.4 FL (ref 6–12)
POTASSIUM SERPL-SCNC: 4.4 MMOL/L (ref 3.5–5.2)
PROT SERPL-MCNC: 6.6 G/DL (ref 6–8.5)
PSA SERPL-MCNC: 3.22 NG/ML (ref 0–4)
RBC # BLD AUTO: 5.15 10*6/MM3 (ref 4.14–5.8)
SODIUM SERPL-SCNC: 139 MMOL/L (ref 136–145)
TRIGL SERPL-MCNC: 112 MG/DL (ref 0–150)
TSH SERPL DL<=0.05 MIU/L-ACNC: 2.5 UIU/ML (ref 0.27–4.2)
VLDLC SERPL-MCNC: 20 MG/DL (ref 5–40)
WBC NRBC COR # BLD: 5.93 10*3/MM3 (ref 3.4–10.8)

## 2023-02-01 PROCEDURE — 80053 COMPREHEN METABOLIC PANEL: CPT | Performed by: FAMILY MEDICINE

## 2023-02-01 PROCEDURE — 1159F MED LIST DOCD IN RCRD: CPT | Performed by: FAMILY MEDICINE

## 2023-02-01 PROCEDURE — G0439 PPPS, SUBSEQ VISIT: HCPCS | Performed by: FAMILY MEDICINE

## 2023-02-01 PROCEDURE — 84443 ASSAY THYROID STIM HORMONE: CPT | Performed by: FAMILY MEDICINE

## 2023-02-01 PROCEDURE — 80061 LIPID PANEL: CPT | Performed by: FAMILY MEDICINE

## 2023-02-01 PROCEDURE — 1126F AMNT PAIN NOTED NONE PRSNT: CPT | Performed by: FAMILY MEDICINE

## 2023-02-01 PROCEDURE — 1170F FXNL STATUS ASSESSED: CPT | Performed by: FAMILY MEDICINE

## 2023-02-01 PROCEDURE — 36415 COLL VENOUS BLD VENIPUNCTURE: CPT | Performed by: FAMILY MEDICINE

## 2023-02-01 PROCEDURE — G0103 PSA SCREENING: HCPCS | Performed by: FAMILY MEDICINE

## 2023-02-01 PROCEDURE — 85025 COMPLETE CBC W/AUTO DIFF WBC: CPT | Performed by: FAMILY MEDICINE

## 2023-02-01 RX ORDER — PREDNISOLONE ACETATE 10 MG/ML
1 SUSPENSION/ DROPS OPHTHALMIC 4 TIMES DAILY
COMMUNITY

## 2023-02-01 RX ORDER — KETOROLAC TROMETHAMINE 5 MG/ML
SOLUTION OPHTHALMIC
COMMUNITY
Start: 2022-10-27

## 2023-02-01 RX ORDER — ROSUVASTATIN CALCIUM 40 MG/1
40 TABLET, COATED ORAL DAILY
Qty: 90 TABLET | Refills: 3 | Status: SHIPPED | OUTPATIENT
Start: 2023-02-01

## 2023-02-01 RX ORDER — AMOXICILLIN 500 MG/1
CAPSULE ORAL
COMMUNITY
Start: 2022-10-31

## 2023-02-01 RX ORDER — LISINOPRIL AND HYDROCHLOROTHIAZIDE 12.5; 1 MG/1; MG/1
1 TABLET ORAL DAILY
Qty: 90 TABLET | Refills: 3 | Status: SHIPPED | OUTPATIENT
Start: 2023-02-01

## 2023-02-01 NOTE — PROGRESS NOTES
The ABCs of the Annual Wellness Visit  Initial Medicare Wellness Visit    Subjective   History of Present Illness:  Lj Crenshaw is a 72 y.o. male who presents for an Initial Medicare Wellness Visit.    The following portions of the patient's history were reviewed and   updated as appropriate:   He  has a past medical history of Hyperlipidemia.  He does not have any pertinent problems on file.  He  has a past surgical history that includes Knee surgery and Eye surgery.  His family history includes Heart disease in his brother.  He  reports that he quit smoking about 7 years ago. His smoking use included cigarettes. He started smoking about 18 years ago. He has a 11.00 pack-year smoking history. He has never used smokeless tobacco. He reports current alcohol use of about 1.0 standard drink per week. He reports that he does not use drugs.  Current Outpatient Medications   Medication Sig Dispense Refill   • amoxicillin (AMOXIL) 500 MG capsule For dental     • ketorolac (ACULAR) 0.5 % ophthalmic solution      • lisinopril-hydrochlorothiazide (PRINZIDE,ZESTORETIC) 10-12.5 MG per tablet TAKE ONE TABLET BY MOUTH DAILY 90 tablet 0   • prednisoLONE acetate (PRED FORTE) 1 % ophthalmic suspension 1 drop 4 (Four) Times a Day.     • rosuvastatin (Crestor) 40 MG tablet Take 1 tablet by mouth Daily. generic 90 tablet 0   • Cyanocobalamin 1000 MCG sublingual tablet Place 1 tablet under the tongue Daily.     • cyanocobalamin 1000 MCG/ML injection Inject 1ml (1000mcg) under skin daily x 1 week, then weekly x 1 month, then monthly until gone.       No current facility-administered medications for this visit.     Current Outpatient Medications on File Prior to Visit   Medication Sig   • amoxicillin (AMOXIL) 500 MG capsule For dental   • ketorolac (ACULAR) 0.5 % ophthalmic solution    • lisinopril-hydrochlorothiazide (PRINZIDE,ZESTORETIC) 10-12.5 MG per tablet TAKE ONE TABLET BY MOUTH DAILY   • prednisoLONE acetate (PRED FORTE) 1  % ophthalmic suspension 1 drop 4 (Four) Times a Day.   • rosuvastatin (Crestor) 40 MG tablet Take 1 tablet by mouth Daily. generic   • Cyanocobalamin 1000 MCG sublingual tablet Place 1 tablet under the tongue Daily.   • cyanocobalamin 1000 MCG/ML injection Inject 1ml (1000mcg) under skin daily x 1 week, then weekly x 1 month, then monthly until gone.   • [DISCONTINUED] Cholecalciferol 50 MCG (2000 UT) tablet Take 2,000 Units by mouth.   • [DISCONTINUED] Olopatadine HCl (PATADAY OP) Apply  to eye(s) as directed by provider.   • [DISCONTINUED] Omega-3 Fatty Acids (FISH OIL) 1000 MG capsule capsule Take  by mouth Daily With Breakfast.     No current facility-administered medications on file prior to visit.     He has No Known Allergies..     Compared to one year ago, the patient feels his physical   health is the same.    Compared to one year ago, the patient feels his mental   health is the same.    Recent Hospitalizations:  He was not admitted to the hospital during the last year.       Current Medical Providers:  Patient Care Team:  Scott Hernandez III, MD as PCP - General (Family Medicine)    Outpatient Medications Prior to Visit   Medication Sig Dispense Refill   • amoxicillin (AMOXIL) 500 MG capsule For dental     • ketorolac (ACULAR) 0.5 % ophthalmic solution      • lisinopril-hydrochlorothiazide (PRINZIDE,ZESTORETIC) 10-12.5 MG per tablet TAKE ONE TABLET BY MOUTH DAILY 90 tablet 0   • prednisoLONE acetate (PRED FORTE) 1 % ophthalmic suspension 1 drop 4 (Four) Times a Day.     • rosuvastatin (Crestor) 40 MG tablet Take 1 tablet by mouth Daily. generic 90 tablet 0   • Cyanocobalamin 1000 MCG sublingual tablet Place 1 tablet under the tongue Daily.     • cyanocobalamin 1000 MCG/ML injection Inject 1ml (1000mcg) under skin daily x 1 week, then weekly x 1 month, then monthly until gone.     • Cholecalciferol 50 MCG (2000 UT) tablet Take 2,000 Units by mouth.     • Olopatadine HCl (PATADAY OP) Apply  to eye(s) as  "directed by provider.     • Omega-3 Fatty Acids (FISH OIL) 1000 MG capsule capsule Take  by mouth Daily With Breakfast.       No facility-administered medications prior to visit.       No opioid medication identified on active medication list. I have reviewed chart for other potential  high risk medication/s and harmful drug interactions in the elderly.          Aspirin is not on active medication list.  Aspirin use is not indicated based on review of current medical condition/s. Risk of harm outweighs potential benefits.  .    Patient Active Problem List   Diagnosis   • Essential hypertension   • Hyperlipidemia   • Polyradiculopathy   • Foot drop, right     Advance Care Planning  Advance Directive is not on file.  ACP discussion was held with the patient during this visit. Patient does not have an advance directive, information provided.    REVIEW OF SYSTEMS  Respiratory no shortness of breath no dyspnea on exertion  Cardiovascular no chest pain no palpitation patient is taking his statin Crestor 40 mg  Neurologic no recent falls using his walker    Objective       Vitals:    02/01/23 1212   BP: 118/70   Pulse: 94   Temp: 98.9 °F (37.2 °C)   SpO2: 96%   Weight: 125 kg (276 lb)   Height: 198.1 cm (78\")     BMI Readings from Last 1 Encounters:   02/01/23 31.90 kg/m²   BMI is above normal parameters. Recommendations include: educational material    Does the patient have evidence of cognitive impairment? No    PHYSICAL EXAM  General no distress  Cardiovascular regular rhythm 2/6 systolic ejection murmur little more evident we will get a echocardiogram  Lungs clear and equal bilaterally  Abdomen soft nontender no hepatosplenomegaly  Rectal 1+ prostate hypertrophy no nodules no masses no asymmetry  Neurologic walks with his walker in a stable gait mentation is normal speech is normal          HEALTH RISK ASSESSMENT    Smoking Status:  Social History     Tobacco Use   Smoking Status Former   • Packs/day: 1.00   • Years: " 11.00   • Pack years: 11.00   • Types: Cigarettes   • Start date:    • Quit date:    • Years since quittin.0   Smokeless Tobacco Never     Alcohol Consumption:  Social History     Substance and Sexual Activity   Alcohol Use Yes   • Alcohol/week: 1.0 standard drink   • Types: 1 Cans of beer per week    Comment: only if goes out to eat     Fall Risk Screen:    CIRA Fall Risk Assessment was completed, and patient is at LOW risk for falls.Assessment completed on:2023    Depression Screen:   PHQ-2/PHQ-9 Depression Screening 2023   Little Interest or Pleasure in Doing Things 0-->not at all   Feeling Down, Depressed or Hopeless 0-->not at all   PHQ-9: Brief Depression Severity Measure Score 0       Health Habits and Functional and Cognitive Screening:  Functional & Cognitive Status 2023   Do you have difficulty preparing food and eating? No   Do you have difficulty bathing yourself, getting dressed or grooming yourself? No   Do you have difficulty using the toilet? No   Do you have difficulty moving around from place to place? No   Do you have trouble with steps or getting out of a bed or a chair? Yes   Current Diet Well Balanced Diet   Dental Exam Not up to date   Eye Exam Not up to date   Exercise (times per week) 3 times per week   Current Exercises Include Walking   Do you need help using the phone?  No   Are you deaf or do you have serious difficulty hearing?  No   Do you need help with transportation? No   Do you need help shopping? No   Do you need help preparing meals?  No   Do you need help with housework?  No   Do you need help with laundry? No   Do you need help taking your medications? No   Do you need help managing money? No   Do you ever drive or ride in a car without wearing a seat belt? No   Have you felt unusual stress, anger or loneliness in the last month? No   Who do you live with? Spouse   If you need help, do you have trouble finding someone available to you? No   Have you  been bothered in the last four weeks by sexual problems? No   Do you have difficulty concentrating, remembering or making decisions? No       Age-appropriate Screening Schedule:  Refer to the list below for future screening recommendations based on patient's age, sex and/or medical conditions. Orders for these recommended tests are listed in the plan section. The patient has been provided with a written plan.    Health Maintenance   Topic Date Due   • TDAP/TD VACCINES (1 - Tdap) Never done   • INFLUENZA VACCINE  08/01/2022   • LIPID PANEL  11/16/2022   • ZOSTER VACCINE  Completed            CMS Preventative Services Quick Reference  Risk Factors Identified During Encounter  None Identified  The above risks/problems have been discussed with the patient.  Follow up actions/plans if indicated are seen below in the Assessment/Plan Section.  Pertinent information has been shared with the patient in the After Visit Summary.      Follow Up:  No follow-ups on file.     An After Visit Summary and PPPS were made available to the patient.      I spent 45 minutes caring for Lj on this date of service. This time includes time spent by me in the following activities:preparing for the visit, reviewing tests, obtaining and/or reviewing a separately obtained history, performing a medically appropriate examination and/or evaluation , counseling and educating the patient/family/caregiver, ordering medications, tests, or procedures, referring and communicating with other health care professionals , documenting information in the medical record, independently interpreting results and communicating that information with the patient/family/caregiver and care coordination    _________________________________________________________________________________________________--  Evaluation & Management    Chief Complaint  Medicare Wellness-subsequent and Med Management    SUBJECTIVE  Lj Crenshaw presents to [facility]  [facility]  Patient is a 72-year-old chronic gait instability foot drop right doing well with his walker hyperlipidemia on Crestor hypertension well controlled     PAST MEDICAL HISTORY  No Known Allergies     Past Surgical History:   • EYE SURGERY   • KNEE SURGERY       Social History     Tobacco Use   • Smoking status: Former     Packs/day: 1.00     Years: 11.00     Pack years: 11.00     Types: Cigarettes     Start date:      Quit date: 2016     Years since quittin.0   • Smokeless tobacco: Never   Substance Use Topics   • Alcohol use: Yes     Alcohol/week: 1.0 standard drink     Types: 1 Cans of beer per week     Comment: only if goes out to eat       Family History   Problem Relation Age of Onset   • Heart disease Brother         Health Maintenance Due   Topic Date Due   • TDAP/TD VACCINES (1 - Tdap) Never done   • Pneumococcal Vaccine 65+ (2 - PCV) 2019   • ANNUAL WELLNESS VISIT  Never done   • COVID-19 Vaccine (3 - Booster for Pfizer series) 2021   • INFLUENZA VACCINE  2022   • LIPID PANEL  2022   • AAA SCREEN (ONE-TIME)  Never done      Last Completed Colonoscopy          COLORECTAL CANCER SCREENING (COLONOSCOPY - Every 10 Years) Next due on 10/28/2023    10/28/2013  COLONOSCOPY (Done - normal repeat 10 years dr jaime)    10/28/2013  SCANNED - COLONOSCOPY                  ASSESSMENT & PLAN  Diagnoses and all orders for this visit:    1. Medicare annual wellness visit, subsequent (Primary)  -     TSH  -     Comprehensive Metabolic Panel  -     PSA Screen  -     Lipid Panel  -     CBC & Differential    2. Laboratory exam ordered as part of routine general medical examination  -     TSH  -     Comprehensive Metabolic Panel  -     PSA Screen  -     Lipid Panel  -     CBC & Differential    3. Screening PSA (prostate specific antigen)  -     PSA Screen    4. Medication management  -     TSH  -     Comprehensive Metabolic Panel  -     Lipid Panel  -     CBC &  Differential    5. Encounter for lipid screening for cardiovascular disease  -     TSH  -     Comprehensive Metabolic Panel  -     Lipid Panel  -     CBC & Differential    6. Mixed hyperlipidemia  -     TSH  -     Comprehensive Metabolic Panel  -     Lipid Panel  -     CBC & Differential    7. Essential hypertension  -     TSH  -     Comprehensive Metabolic Panel  -     Lipid Panel  -     CBC & Differential    8. Polyradiculopathy  -     TSH  -     Comprehensive Metabolic Panel  -     Lipid Panel  -     CBC & Differential    9. Body mass index (BMI) of 31.0 to 31.9 in adult    10. Foot drop, right      Hypertension well controlled gait instability and foot drop stable hyperlipidemia on Crestor needs a lipid CMP prostate screen is done echocardiogram ordered for 2/6 systolic ejection murmur more prevalent            Patient was given instructions and counseling regarding his condition or for health maintenance advice. Please see specific information pulled into the AVS if appropriate.

## 2023-03-22 ENCOUNTER — HOSPITAL ENCOUNTER (OUTPATIENT)
Dept: CARDIOLOGY | Facility: HOSPITAL | Age: 73
Discharge: HOME | End: 2023-03-22
Admitting: FAMILY MEDICINE
Payer: MEDICARE

## 2023-03-22 DIAGNOSIS — R01.1 SYSTOLIC MURMUR: ICD-10-CM

## 2023-03-22 LAB
BH CV ECHO MEAS - AO ROOT DIAM: 3.4 CM
BH CV ECHO MEAS - EDV(MOD-SP2): 66 ML
BH CV ECHO MEAS - EDV(MOD-SP4): 62 ML
BH CV ECHO MEAS - EF(MOD-BP): 67 %
BH CV ECHO MEAS - EF(MOD-SP2): 66 %
BH CV ECHO MEAS - EF(MOD-SP4): 67 %
BH CV ECHO MEAS - ESV(MOD-SP2): 23 ML
BH CV ECHO MEAS - ESV(MOD-SP4): 21 ML
BH CV ECHO MEAS - IVSD: 1.7 CM
BH CV ECHO MEAS - LA DIMENSION: 3.4 CM
BH CV ECHO MEAS - LAT PEAK E' VEL: 6.3 CM/SEC
BH CV ECHO MEAS - LVIDD: 4.3 CM
BH CV ECHO MEAS - LVIDS: 2.9 CM
BH CV ECHO MEAS - LVOT DIAM: 2 CM
BH CV ECHO MEAS - LVPWD: 1.2 CM
BH CV ECHO MEAS - MED PEAK E' VEL: 7.2 CM/SEC
BH CV ECHO MEAS - MV A MAX VEL: 83 CM/SEC
BH CV ECHO MEAS - MV DEC TIME: 62 MSEC
BH CV ECHO MEAS - MV E MAX VEL: 63 CM/SEC
BH CV ECHO MEAS - MV E/A: 0.8
BH CV ECHO MEAS - RAP SYSTOLE: 31 MMHG
BH CV ECHO MEAS - RVDD: 3.2 CM
BH CV ECHO MEAS - RVSP: 3 MMHG
BH CV ECHO MEAS - TR MAX PG: 28 MMHG
BH CV ECHO MEAS - TR MAX VEL: 263 CM/SEC
BH CV ECHO MEASUREMENTS AVERAGE E/E' RATIO: 9.33
IVRT: 98 MSEC
LEFT ATRIUM VOLUME INDEX: 13.9 ML/M2
LEFT ATRIUM VOLUME: 36.1 ML
MAXIMAL PREDICTED HEART RATE: 149 BPM
STRESS TARGET HR: 127 BPM

## 2023-03-22 PROCEDURE — 93306 TTE W/DOPPLER COMPLETE: CPT | Performed by: SPECIALIST

## 2023-03-22 PROCEDURE — 93306 TTE W/DOPPLER COMPLETE: CPT

## 2023-03-22 PROCEDURE — 25010000002 SULFUR HEXAFLUORIDE MICROSPH 60.7-25 MG RECONSTITUTED SUSPENSION: Performed by: SPECIALIST

## 2023-03-22 RX ADMIN — SULFUR HEXAFLUORIDE 4 ML: KIT at 12:01

## 2023-04-18 NOTE — PROGRESS NOTES
[facility]  Outside Information                              Contains abnormal data Comprehensive metabolic panel  Specimen:  Blood specimen (specimen)  Component   Ref Range & Units 2 mo ago   Sodium   135 - 143 mmol/L 138    Potassium   3.7 - 5 mmol/L 4.3    Chloride   100 - 108 mmol/L 102    CO2   22 - 30 mmol/L 23.0    Anion Gap   2 - 11 13.0 High     Calcium   8.4 - 10.2 mg/dL 9.6    Glucose   70 - 110 mg/dL 103    BUN   6 - 20 mg/dL 21 High     Creatinine   0.64 - 1.27 mg/dL 0.77    BUN/Creatinine Ratio   6 - 22 27.3 High     Albumin   3.6 - 4.7 Gram/dL 3.9    Total Protein   6.3 - 8.2 Gram/dL 6.6    A/G Ratio   1 - 1.8 1.4    Alkaline Phosphatase   34 - 106 Units/Liter 50    ALT (SGPT)   <=32 Units/Liter 20    AST   12 - 38 Units/Liter 26    Total Bilirubin   0.4 - 1.3 mg/dL 0.9    Globulin, Total  3    eGFR   >=60 mL/min/1.73m2 121    eGFR   >=60 mL/min/1.73m2 100    Resulting Agency [facility]   Specimen Collected: 09/02/21  4:09 PM Last Resulted: 09/02/21  4:58 PM   [facility]  Result Received: 11/16/21 11:47 AM     Recent Data from [facility]  Related to Comprehensive metabolic panel  Component 10/20/21 09/02/21 09/02/21 09/02/21   Sodium -- 138 -- --   Potassium -- 4.3 -- --   Chloride -- 102 -- --   CO2 -- 23.0 -- --   Anion Gap -- 13.0 High  -- --   Calcium 9.3 9.6 -- --   Glucose -- 103 -- --   BUN -- 21 High  -- --   Creatinine -- 0.77 -- --   BUN/Creatinine Ratio -- 27.3 High  -- --   Albumin -- 3.9 -- --   Total Protein -- 6.6 -- 6.6   A/G Ratio -- 1.4 -- --   Alkaline Phosphatase -- 50 -- --   ALT (SGPT) -- 20 -- --   AST -- 26 -- --   Total Bilirubin -- 0.9 -- --   Globulin, Total -- 3 -- --   eGFR -- 121 100 --   eGFR -- 121 100 --        Patient would like communication of their results via:        Cell Phone:   Telephone Information:   Mobile [phone]     Okay to leave a message containing results? Yes

## 2023-04-19 ENCOUNTER — TELEPHONE (OUTPATIENT)
Dept: FAMILY MEDICINE CLINIC | Facility: CLINIC | Age: 73
End: 2023-04-19
Payer: COMMERCIAL

## 2023-04-19 NOTE — TELEPHONE ENCOUNTER
Patient called office states left foot swollen, no pain.  Wondered if had gout.  But started swelling after tripped last weekend.  Appointment made for patient 04/20/2023 10:00am patient instructed

## 2023-04-20 ENCOUNTER — OFFICE VISIT (OUTPATIENT)
Dept: FAMILY MEDICINE CLINIC | Facility: CLINIC | Age: 73
End: 2023-04-20
Payer: MEDICARE

## 2023-04-20 VITALS
TEMPERATURE: 98.6 F | HEART RATE: 95 BPM | HEIGHT: 78 IN | OXYGEN SATURATION: 97 % | WEIGHT: 276 LBS | DIASTOLIC BLOOD PRESSURE: 64 MMHG | BODY MASS INDEX: 31.93 KG/M2 | SYSTOLIC BLOOD PRESSURE: 108 MMHG

## 2023-04-20 DIAGNOSIS — S93.402A SPRAIN OF LEFT ANKLE, UNSPECIFIED LIGAMENT, INITIAL ENCOUNTER: Primary | ICD-10-CM

## 2023-05-11 DIAGNOSIS — N52.9 ERECTILE DYSFUNCTION, UNSPECIFIED ERECTILE DYSFUNCTION TYPE: Primary | ICD-10-CM

## 2023-05-11 RX ORDER — TADALAFIL 20 MG/1
TABLET ORAL
Qty: 30 TABLET | Refills: 0 | Status: SHIPPED | OUTPATIENT
Start: 2023-05-11

## 2023-06-12 ENCOUNTER — TELEPHONE (OUTPATIENT)
Dept: FAMILY MEDICINE CLINIC | Facility: CLINIC | Age: 73
End: 2023-06-12
Payer: COMMERCIAL

## 2023-06-12 DIAGNOSIS — R26.81 UNSTEADY GAIT: Primary | ICD-10-CM

## 2023-06-12 DIAGNOSIS — R29.898 MUSCULAR DECONDITIONING: ICD-10-CM

## 2023-06-12 NOTE — TELEPHONE ENCOUNTER
Caller: Lj Crenshaw    Relationship: Self    Best call back number: [phone]     What is the medical concern/diagnosis: BALANCE ISSUES    What specialty or service is being requested: PHYSICAL THERAPY REFERRAL    What is the provider, practice or medical service name: [facility] IN [place]    What is the office location: [facility]    What is the office phone number: UNKNOWN

## 2023-07-21 ENCOUNTER — TREATMENT (OUTPATIENT)
Dept: PHYSICAL THERAPY | Facility: CLINIC | Age: 73
End: 2023-07-21
Payer: MEDICARE

## 2023-07-21 DIAGNOSIS — R53.1 GENERALIZED WEAKNESS: Primary | ICD-10-CM

## 2023-07-21 DIAGNOSIS — R27.8 COORDINATION IMPAIRMENT: ICD-10-CM

## 2023-07-21 DIAGNOSIS — R26.9 GAIT DISTURBANCE: ICD-10-CM

## 2023-07-21 DIAGNOSIS — R26.89 BALANCE PROBLEM: ICD-10-CM

## 2023-07-21 PROCEDURE — 97110 THERAPEUTIC EXERCISES: CPT | Performed by: PHYSICAL THERAPIST

## 2023-07-21 PROCEDURE — 97112 NEUROMUSCULAR REEDUCATION: CPT | Performed by: PHYSICAL THERAPIST

## 2023-07-21 PROCEDURE — 97530 THERAPEUTIC ACTIVITIES: CPT | Performed by: PHYSICAL THERAPIST

## 2023-07-25 ENCOUNTER — TREATMENT (OUTPATIENT)
Dept: PHYSICAL THERAPY | Facility: CLINIC | Age: 73
End: 2023-07-25
Payer: MEDICARE

## 2023-07-25 DIAGNOSIS — R53.1 GENERALIZED WEAKNESS: Primary | ICD-10-CM

## 2023-07-25 DIAGNOSIS — R26.9 GAIT DISTURBANCE: ICD-10-CM

## 2023-07-25 DIAGNOSIS — R26.89 BALANCE PROBLEM: ICD-10-CM

## 2023-07-25 DIAGNOSIS — R27.8 COORDINATION IMPAIRMENT: ICD-10-CM

## 2023-07-25 PROCEDURE — 97112 NEUROMUSCULAR REEDUCATION: CPT | Performed by: PHYSICAL THERAPIST

## 2023-07-25 PROCEDURE — 97530 THERAPEUTIC ACTIVITIES: CPT | Performed by: PHYSICAL THERAPIST

## 2023-07-25 PROCEDURE — 97110 THERAPEUTIC EXERCISES: CPT | Performed by: PHYSICAL THERAPIST

## 2023-07-25 NOTE — PROGRESS NOTES
Outpatient Physical Therapy  [address], [place] [address]                            Physical Therapy Daily Treatment Note    Patient: Lj Crenshaw   : 1950  Diagnosis/ICD-10 Code:  Generalized weakness [R53.1]  Referring practitioner: Scott Hernandez III, MD  Date of Initial Visit: Type: THERAPY  Noted: 2023  Today's Date: 2023  Patient seen for 7 sessions           Subjective   Lj Crenshaw reports: that his legs feels a little better now, states that sometimes his legs are sore after PT session just depends on the exercises.     Objective   Decreased balance with step stance on Airex and ball behind back, preformed narrow BANDAR instead    See Exercise, Manual, and Modality Logs for complete treatment.     Assessment/Plan  Lj progressing as evident by decreased falls and increased tolerance of PT session. Pt required minimal assistance for upright posture throughout PT session, especially with balance activities. Pt would benefit from skilled PT to address strength and endurance deficits, transfer and gait training and any concerns with ADLs.       Progress per Plan of Care         Timed:  Manual Therapy:         mins  83557;  Therapeutic Exercise:    15     mins  93060;     Neuromuscular Reyna:    15    mins  27632;    Therapeutic Activity:     15     mins  09730;     Gait Training:           mins  74472;    Aquatic Therapy:          mins  89087;       Untimed:  Electrical Stimulation:         mins  61291 ( );  Mechanical Traction:         mins  89343;       Timed Treatment:   45   mins   Total Treatment:     45   mins      Electronically signed:     Trudy Mckay PTA  Physical Therapist Assistant  [facility] [place] License #: [id number]

## 2023-07-28 ENCOUNTER — TREATMENT (OUTPATIENT)
Dept: PHYSICAL THERAPY | Facility: CLINIC | Age: 73
End: 2023-07-28
Payer: MEDICARE

## 2023-07-28 DIAGNOSIS — R26.89 BALANCE PROBLEM: ICD-10-CM

## 2023-07-28 DIAGNOSIS — R26.9 GAIT DISTURBANCE: ICD-10-CM

## 2023-07-28 DIAGNOSIS — R53.1 GENERALIZED WEAKNESS: Primary | ICD-10-CM

## 2023-07-28 DIAGNOSIS — R27.8 COORDINATION IMPAIRMENT: ICD-10-CM

## 2023-07-28 NOTE — PROGRESS NOTES
Outpatient Physical Therapy  [address] [facility], [place] [address]                            Physical Therapy Daily Treatment Note    Patient: Lj Crenshaw   : 1950  Diagnosis/ICD-10 Code:  Generalized weakness [R53.1]  Referring practitioner: Scott Hernandez III, MD  Date of Initial Visit: Type: THERAPY  Noted: 2023  Today's Date: 2023  Patient seen for 8 sessions           Subjective   Lj Crenshaw reports: being a little sore after last PT session.     Objective   No LOB throughout PT session.    See Exercise, Manual, and Modality Logs for complete treatment.     Assessment/Plan  Lj progressing as evident by decreased falls and increased tolerance of PT session. Pt required Stand by Assist for Safety throughout PT session, no LOB. Pt would benefit from skilled PT to address strength and endurance deficits, transfer and gait training and any concerns with ADLs.       Progress per Plan of Care         Timed:  Manual Therapy:        mins  08323;  Therapeutic Exercise:    15     mins  43152;     Neuromuscular Reyna:    15    mins  92834;    Therapeutic Activity:     15     mins  87810;     Gait Training:           mins  64567;          Timed Treatment:   45   mins   Total Treatment:     45   mins      Electronically signed:   Trudy Mckay PTA  Physical Therapist Assistant  [facility] License #: [id number]

## 2023-08-01 ENCOUNTER — TREATMENT (OUTPATIENT)
Dept: PHYSICAL THERAPY | Facility: CLINIC | Age: 73
End: 2023-08-01
Payer: MEDICARE

## 2023-08-01 DIAGNOSIS — R27.8 COORDINATION IMPAIRMENT: ICD-10-CM

## 2023-08-01 DIAGNOSIS — R53.1 GENERALIZED WEAKNESS: Primary | ICD-10-CM

## 2023-08-01 DIAGNOSIS — R26.9 GAIT DISTURBANCE: ICD-10-CM

## 2023-08-01 DIAGNOSIS — R26.89 BALANCE PROBLEM: ICD-10-CM

## 2023-08-04 ENCOUNTER — TREATMENT (OUTPATIENT)
Dept: PHYSICAL THERAPY | Facility: CLINIC | Age: 73
End: 2023-08-04
Payer: MEDICARE

## 2023-08-04 DIAGNOSIS — R26.89 BALANCE PROBLEM: ICD-10-CM

## 2023-08-04 DIAGNOSIS — R26.9 GAIT DISTURBANCE: ICD-10-CM

## 2023-08-04 DIAGNOSIS — R53.1 GENERALIZED WEAKNESS: Primary | ICD-10-CM

## 2023-08-04 DIAGNOSIS — R27.8 COORDINATION IMPAIRMENT: ICD-10-CM

## 2023-08-04 PROCEDURE — 97112 NEUROMUSCULAR REEDUCATION: CPT | Performed by: PHYSICAL THERAPIST

## 2023-08-04 PROCEDURE — 97110 THERAPEUTIC EXERCISES: CPT | Performed by: PHYSICAL THERAPIST

## 2023-08-04 NOTE — PROGRESS NOTES
"Physical Therapy Daily Treatment Note  [address] [place] [address]    Patient: Lj Crenshaw   : 1950  Diagnosis/ICD-10 Code:  Generalized weakness [R53.1]  Referring practitioner: Scott Hernandez III, MD  Date of Initial Visit: Type: THERAPY  Noted: 2023  Today's Date: 2023  Patient seen for 10 sessions           Subjective : Patient reports that he is doing okay today and denies any changes since last session. Patient reports that he had some pain with putting on his AFO this morning, but says it has subsided now.     Objective   See Exercise, Manual, and Modality Logs for complete treatment.       Assessment/Plan : Pt tolerated today's session well. PT attempted to progress pt with balance activities with addition of step stance on a 4\" step with pt unable to tolerate at this time. Pt challenged by addition of UE movements outside of BANDAR during stance this date. Pt would benefit from continued skilled therapy to address deficits. Progress per plan of care.             Timed:  Manual Therapy:    0     mins  11376;  Therapeutic Exercise:    16     mins  47411;     Neuromuscular Reyna:    18    mins  69113;    Therapeutic Activity:    0      mins  77478;     Gait Trainin     mins  18513;     Ultrasound:     0     mins  67944;    Aquatic Therapy    0     mins  46687;      Timed Treatment:   34   mins   Total Treatment:     34   mins    Alysha Nuñez   Physical Therapist Student    Electronically signed 2023    Supervised by:   Johnnie Mckay PT  [place] License: PT - [id number]     Supervising therapist was active and present throughout the duration of the treatment session.     "

## 2023-08-08 ENCOUNTER — TREATMENT (OUTPATIENT)
Dept: PHYSICAL THERAPY | Facility: CLINIC | Age: 73
End: 2023-08-08
Payer: MEDICARE

## 2023-08-08 DIAGNOSIS — R53.1 GENERALIZED WEAKNESS: Primary | ICD-10-CM

## 2023-08-08 DIAGNOSIS — R26.89 BALANCE PROBLEM: ICD-10-CM

## 2023-08-08 DIAGNOSIS — R27.8 COORDINATION IMPAIRMENT: ICD-10-CM

## 2023-08-08 DIAGNOSIS — R26.9 GAIT DISTURBANCE: ICD-10-CM

## 2023-08-08 NOTE — PROGRESS NOTES
Outpatient Physical Therapy  [address] [facility] [address], [place] [address]                            Physical Therapy Daily Treatment Note    Patient: Lj Crenshaw   : 1950  Diagnosis/ICD-10 Code:  Generalized weakness [R53.1]  Referring practitioner: Scott Hernandez III, MD  Date of Initial Visit: Type: THERAPY  Noted: 2023  Today's Date: 2023  Patient seen for 11 sessions           Subjective   Lj Crenshaw reports: [place] he doesn't get on the boat because he'd have to go down a hill and walk around the dock to get on the pontoon.     Objective   Increased fatigue in LE throughout PT session    See Exercise, Manual, and Modality Logs for complete treatment.     Assessment/Plan  Lj progressing as evident by decreased falls and increased tolerance of PT session. Pt required Stand by Assist for Safety throughout PT session, increased fatigue in LE throughout PT session. Pt would benefit from skilled PT to address strength and endurance deficits, transfer and gait training and any concerns with ADLs.       Progress per Plan of Care         Timed:  Manual Therapy:        mins  64441;  Therapeutic Exercise:    15     mins  62662;     Neuromuscular Reyna:    15    mins  40411;    Therapeutic Activity:     15     mins  22620;     Gait Training:           mins  02696;          Timed Treatment:   45   mins   Total Treatment:     45   mins      Electronically signed:   Trudy Mckay PTA  Physical Therapist Assistant  [facility] License #: [id number]

## 2023-08-11 ENCOUNTER — TREATMENT (OUTPATIENT)
Dept: PHYSICAL THERAPY | Facility: CLINIC | Age: 73
End: 2023-08-11
Payer: MEDICARE

## 2023-08-11 DIAGNOSIS — R26.89 BALANCE PROBLEM: ICD-10-CM

## 2023-08-11 DIAGNOSIS — R27.8 COORDINATION IMPAIRMENT: ICD-10-CM

## 2023-08-11 DIAGNOSIS — R53.1 GENERALIZED WEAKNESS: Primary | ICD-10-CM

## 2023-08-11 DIAGNOSIS — R26.9 GAIT DISTURBANCE: ICD-10-CM

## 2023-08-11 NOTE — PROGRESS NOTES
Outpatient Physical Therapy  [address] [facility], [place] [address]                            Physical Therapy Daily Treatment Note    Patient: Lj Crenshaw   : 1950  Diagnosis/ICD-10 Code:  Generalized weakness [R53.1]  Referring practitioner: Scott Hernandez III, MD  Date of Initial Visit: Type: THERAPY  Noted: 2023  Today's Date: 2023  Patient seen for 12 sessions           Subjective   Lj Crenshaw reports: that his low back is bothering him a little more today.     Objective   Gait Training in parallel, unable to perform without significant UE support.     See Exercise, Manual, and Modality Logs for complete treatment.     Assessment/Plan  Lj progressing as evident by decreased falls and increased tolerance of PT session. Pt required Stand by Assist for Safety throughout PT session, no LOB throughout session. Pt would benefit from skilled PT to address strength and endurance deficits, transfer and gait training and any concerns with ADLs.       Progress per Plan of Care         Timed:  Manual Therapy:         mins  83371;  Therapeutic Exercise:    15     mins  46137;     Neuromuscular Reyna:    15    mins  02118;    Therapeutic Activity:     15     mins  12291;     Gait Training:           mins  53503;    Aquatic Therapy:          mins  19700;       Untimed:  Electrical Stimulation:         mins  64887 ( );  Mechanical Traction:         mins  03468;       Timed Treatment:   45   mins   Total Treatment:     45   mins      Electronically signed:     Trudy Mckay PTA  Physical Therapist Assistant  [facility] [place] License #: [id number]

## 2023-08-15 ENCOUNTER — TREATMENT (OUTPATIENT)
Dept: PHYSICAL THERAPY | Facility: CLINIC | Age: 73
End: 2023-08-15
Payer: MEDICARE

## 2023-08-15 DIAGNOSIS — R26.89 BALANCE PROBLEM: ICD-10-CM

## 2023-08-15 DIAGNOSIS — R26.9 GAIT DISTURBANCE: ICD-10-CM

## 2023-08-15 DIAGNOSIS — R27.8 COORDINATION IMPAIRMENT: ICD-10-CM

## 2023-08-15 DIAGNOSIS — R53.1 GENERALIZED WEAKNESS: Primary | ICD-10-CM

## 2023-08-15 PROCEDURE — 97112 NEUROMUSCULAR REEDUCATION: CPT | Performed by: PHYSICAL THERAPIST

## 2023-08-15 PROCEDURE — 97110 THERAPEUTIC EXERCISES: CPT | Performed by: PHYSICAL THERAPIST

## 2023-08-15 PROCEDURE — 97530 THERAPEUTIC ACTIVITIES: CPT | Performed by: PHYSICAL THERAPIST

## 2023-08-15 NOTE — PROGRESS NOTES
Outpatient Physical Therapy  [address] [facility], [place] [address]                            Physical Therapy Daily Treatment Note    Patient: Lj Crenshaw   : 1950  Diagnosis/ICD-10 Code:  Generalized weakness [R53.1]  Referring practitioner: Scott Hernandez III, MD  Date of Initial Visit: Type: THERAPY  Noted: 2023  Today's Date: 8/15/2023  Patient seen for 13 sessions           Subjective   Lj Crenshaw reports: that he does feel like his legs are getting stronger, no falls.     Objective   No LOB throughout PT session    See Exercise, Manual, and Modality Logs for complete treatment.     Assessment/Plan  Lj progressing as evident by decreased falls and increased tolerance of PT session. Pt required moderate assistance throughout PT session to get off Nvigen Row machine. Pt would benefit from skilled PT to address strength and endurance deficits, transfer and gait training and any concerns with ADLs.       Progress per Plan of Care         Timed:  Manual Therapy:         mins  66693;  Therapeutic Exercise:    15     mins  94076;     Neuromuscular Reyna:    15    mins  71868;    Therapeutic Activity:     15     mins  90839;     Gait Training:           mins  26891;    Aquatic Therapy:          mins  63391;       Untimed:  Electrical Stimulation:         mins  43753 ( );  Mechanical Traction:         mins  34020;       Timed Treatment:   45   mins   Total Treatment:     45   mins      Electronically signed:     Trudy Mckay PTA  Physical Therapist Assistant  [facility] License #: [id number]

## 2023-08-18 ENCOUNTER — TELEPHONE (OUTPATIENT)
Dept: FAMILY MEDICINE CLINIC | Facility: CLINIC | Age: 73
End: 2023-08-18
Payer: COMMERCIAL

## 2023-08-18 NOTE — TELEPHONE ENCOUNTER
Patient requesting a new rollator walker.  States the one he has is getting weak.  Still needs it to assist with patient walking.

## 2023-08-18 NOTE — TELEPHONE ENCOUNTER
"  Caller: Lj Crenshaw \"Poli\"    Relationship: Self    Best call back number:     [phone]       What is the best time to reach you: ANY    Who are you requesting to speak with (clinical staff, provider,  specific staff member): MD PRESSLEY      What was the call regarding: PATIENT WOULD LIKE A CALL BACK FROM MD PRESSLEY. THIS WAS ALL THE INFORMATION PROVIDED.  "

## 2023-08-21 DIAGNOSIS — M21.371 FOOT DROP, RIGHT: ICD-10-CM

## 2023-08-21 DIAGNOSIS — R29.898 MUSCULAR DECONDITIONING: ICD-10-CM

## 2023-08-21 DIAGNOSIS — R26.81 UNSTEADY GAIT: Primary | ICD-10-CM

## 2023-08-22 ENCOUNTER — TREATMENT (OUTPATIENT)
Dept: PHYSICAL THERAPY | Facility: CLINIC | Age: 73
End: 2023-08-22
Payer: MEDICARE

## 2023-08-22 ENCOUNTER — TELEPHONE (OUTPATIENT)
Dept: FAMILY MEDICINE CLINIC | Facility: CLINIC | Age: 73
End: 2023-08-22
Payer: COMMERCIAL

## 2023-08-22 DIAGNOSIS — R53.1 GENERALIZED WEAKNESS: Primary | ICD-10-CM

## 2023-08-22 DIAGNOSIS — R26.9 GAIT DISTURBANCE: ICD-10-CM

## 2023-08-22 DIAGNOSIS — R27.8 COORDINATION IMPAIRMENT: ICD-10-CM

## 2023-08-22 DIAGNOSIS — R26.89 BALANCE PROBLEM: ICD-10-CM

## 2023-08-22 NOTE — TELEPHONE ENCOUNTER
"    Caller: Lj Crenshaw \"Poli\"    Relationship to patient: Self    Best call back number: [phone]     Chief complaint: THERAPIST ADVISED PATIENT TO SEE PRIMARY CARE PHYSICIAN TO DISCUSS HOW THINGS ARE GOING    Type of visit: OFFICE VISIT    Requested date: AS SOON AS POSSIBLE     Additional notes: PATIENT REQUESTED A CALL BACK TO SCHEDULE APPOINTMENT WHEN PROVIDER GETS THE CHANCE. CALLER DOES NOT WANT TO SEE AN APRN.            "

## 2023-08-22 NOTE — PROGRESS NOTES
Progress Assessment  [address] [place] [id number]        Patient: Lj Crenshaw   : 1950  Diagnosis/ICD-10 Code:  Generalized weakness [R53.1]  Referring practitioner: Scott Hernandez III, MD  Date of Initial Visit: Type: THERAPY  Noted: 2023  Today's Date: 2023  Patient seen for 14 sessions      Subjective:     Subjective Questionnaire: 30 second STS with UE support: 3 times at 22.5 inch table; TU.9 seconds with rollator  Home Program Compliance: Yes  Treatment has included: therapeutic exercise, neuromuscular re-education, manual therapy, therapeutic activity, and gait training    Subjective     Pt reports on Thursday night he fell going up the stairs. Pt reports his R foot got stuck on the step and he fell backwards hitting his head. Pt reports the ambulance came and assessed the patient and assisted patient off of the ground, but did not take patient to hospital. Pt reports he had a tennis ball sized knot on his head. Pt does still have a bump on his head but it is significantly smaller. Pt also has abrasions from his fall on his L LE covered with Band-Aids. Pt reports overall he feels 25% better since starting PT. Pt reports he is noticing having a harder time going down ramps with rollator. Pt reports he has to have his breaks on with the rollator and then have someone in front of him slowing the rollator down additionally and reports the other day he had a second person holding onto his belt loop to steady him going down a ramp.     Objective     Strength/Myotome Testing     Left Shoulder      Planes of Motion   Flexion: 4+  Abduction: 4+   External rotation at 0ø: 5  Internal rotation at 0ø: 5     Isolated Muscles   Biceps: 5  Triceps: 5    Right Shoulder      Planes of Motion   Flexion: 4+   Abduction: 4+   External rotation at 0ø: 5  Internal rotation at 0ø: 5     Isolated Muscles   Biceps: 5  Triceps: 5     Left Hip   Planes of Motion   Flexion: 2  Abduction:  2+  Adduction: 2+     Right Hip   Planes of Motion   Flexion: 2  Abduction: 2+  Adduction: 2+     Left Knee   Flexion: 4+  Extension: 4+     Right Knee   Flexion: 3+  Extension: 4-     Left Ankle/Foot   Dorsiflexion: 0     Right Ankle/Foot   Dorsiflexion: 0    Unable to perform heel to shin test due to weakness.   Finger to nose test: 75% accuracy bilaterally     Feet shoulder width apart: 30 seconds  Feet together: unable to perform due to poor balance    Assessment/Plan    Pt over the last month has made some progress in certain areas and decreased progress in others. Pt has made progress with improving B UE strength, finger to nose coordination test, and did improve TUG test by 2.6 seconds. Pt's STS test decreased by 1 rep and patient was unable to perform heel to shin coordination test due to weakness in B LE. Pt was able to perform heel to shin test at initial evaluation on 06/21/2023. Pt is able to perform balance testing with feet shoulder width apart for 30 seconds with CGA and significant forward and backwards sway. Pt is unable to stand with his feet together due to poor balance and requires use of countertop to stabilize. The greatest change in patient's status since last progress note is his significant decrease in LE strength. Pt has decreased strength in hip adductors, R knee flexion and extension, and L dorsiflexion. Pt's significant decrease in L ankle dorsiflexion has now caused patient to compensate with ambulation to increase L hip flexion in order to clear foot in swing phase. Discussed with patient that is dorsiflexion doesn't improve, patient will need AFO on L foot as well. Due to patient's decrease in strength, balance, gait mechanics, and reports of increase in difficulty with functional mobility since last progress note despite being very compliant with PT advised patient to follow up with PCP to discuss patient's results of today's progress note and decline in strength. Patient agreed.  Will continue with PT in order to address continued deficits.       Plan Goals:   1. Mobility: Walking/Moving Around Functional Limitation                               LTG 1: 12 weeks:  The patient will demonstrate TUG score <16 seconds for reduced fall risk.                           STATUS:  IN PROGRESS              STG 1a: 6 weeks:  The patient will demonstrate TUG score <18 seconds for reduced fall risk.                           STATUS:  IN PROGRESS    2. The patient has limited strength of the B shoulders and B hips.              LTG 2: 12 weeks:  The patient will demonstrate 4+ /5 strength for B shoulder flexion, abduction, external rotation, and internal rotation in order to demonstrate improved shoulder stability.                          STATUS:  MET              LTG 2a: 12 weeks:  The patient will demonstrate 4+/5 strength for B hip flexion, abduction, and extension in order to improve hip stability.                          STATUS:  IN PROGRESS    3. The patient has gait dysfunction.              LTG 3: 12 weeks:  The patient will demonstrate > 22 / 30 on the Functional Gait Assessment for improved functional mobility.                            STATUS:  IN PROGRESS              STG 3a: The patient will demonstrate > 18 / 30 on the Functional Gait Assessment for improved functional mobility.                            STATUS:  IN PROGRESS     4. The patient has difficulty with balance.               LTG 4: 12 weeks: The patient will hold the sharpened romberg position on open with eyes open for 20 seconds in order to improve balance and decrease risk of falling.                           STATUS: IN PROGRESS              STG 4: 6 weeks: The patient will hold the romberg position on foam with eyes closed for 20 seconds in order to improve balance and decrease risk of falling.                           STATUS: IN PROGRESS     Progress toward previous goals: Partially Met    See Exercise, Manual, and  Modality Logs for complete treatment.         Recommendations: Continue as planned  Timeframe:2x a week for 1 month  Prognosis to achieve goals: fair    PT SIGNATURE: Electronically signed by Tal Guerra, MARIXA  [place] LICENSE: [id number]    Based upon review of the patient's progress and continued therapy plan, it is my medical opinion that Lj Crenshaw should continue physical therapy treatment at [facility] PHYSICAL THERAPY  [address]  [place] [address] [phone].      Timed:                 Manual Therapy:    0     mins  26190;     Therapeutic Exercise:    8     mins  42975;     Neuromuscular Reyna:    0    mins  31927;    Therapeutic Activity:     30     mins  21714;     Gait Trainin     mins  07802;     Ultrasound:     0     mins  01633;    Ionto                               0    mins   34204  Self pay                         0     mins [email or web address]    Un-Timed:  Electrical Stimulation:    0     mins  32966 ( )  Canalith Repos    0     mins 64284  Dry Needling     0     mins self-pay  Traction     0     mins 01563    Timed Treatment:   38   mins   Total Treatment:     38   mins      I certify that the therapy services are furnished while this patient is under my care.  The services outlined above are required by this patient, and will be reviewed every 90 days.

## 2023-08-22 NOTE — TELEPHONE ENCOUNTER
Called patient left message for patient that I am working on finding him a spot on the schedule. Dr Hernandez does not have any openings.  But there is a new doctor that is coming that I could put him with.  Patient to call back next week to let us know if he wants to see the new doctor.

## 2023-08-25 ENCOUNTER — TREATMENT (OUTPATIENT)
Dept: PHYSICAL THERAPY | Facility: CLINIC | Age: 73
End: 2023-08-25
Payer: MEDICARE

## 2023-08-25 DIAGNOSIS — R53.1 GENERALIZED WEAKNESS: Primary | ICD-10-CM

## 2023-08-25 DIAGNOSIS — R26.9 GAIT DISTURBANCE: ICD-10-CM

## 2023-08-25 DIAGNOSIS — R27.8 COORDINATION IMPAIRMENT: ICD-10-CM

## 2023-08-25 DIAGNOSIS — R26.89 BALANCE PROBLEM: ICD-10-CM

## 2023-08-25 NOTE — PROGRESS NOTES
Physical Therapy Daily Note  [address] [place] [address]    VISIT#: 15    Subjective   Lj Crenshaw reports he has been performing his exercises at home with his ankle weights. Pt reports he got his new walker this week but reports he is using his older one today for therapy.       Objective     See Exercise, Manual, and Modality Logs for complete treatment.     Assessment/Plan    Continued to progress patient's strengthening exercises today and patient was challenged with hip extension exercises secondary to weakness. Pt was able to maintain dynamic sitting balance today without foot support with no LOB but patient has significant increase in difficulty with static standing balance. Will continue to progress patient as able.     Progress per Plan of Care and Progress strengthening /stabilization /functional activity            Timed:                 Manual Therapy:    0     mins  30410;     Therapeutic Exercise:    15     mins  18323;     Neuromuscular Reyna:    0    mins  92990;    Therapeutic Activity:     15     mins  43188;     Gait Trainin     mins  74793;     Ultrasound:     0     mins  92571;    Ionto                               0    mins   18079  Self pay                         0     mins [email or web address]    Un-Timed:  Electrical Stimulation:    0     mins  40344 ( )  Canalith Repos    0     mins 87229  Dry Needling     0     mins self-pay  Traction     0     mins 13794    Timed Treatment:   30   mins   Total Treatment:     30   mins    Tal Guerra PT  Physical Therapist    PT SIGNATURE: Electronically signed by Tal Guerra PT  [place] LICENSE: [id number]

## 2023-08-29 ENCOUNTER — TREATMENT (OUTPATIENT)
Dept: PHYSICAL THERAPY | Facility: CLINIC | Age: 73
End: 2023-08-29
Payer: MEDICARE

## 2023-08-29 DIAGNOSIS — R53.1 GENERALIZED WEAKNESS: Primary | ICD-10-CM

## 2023-08-29 DIAGNOSIS — R27.8 COORDINATION IMPAIRMENT: ICD-10-CM

## 2023-08-29 DIAGNOSIS — R26.9 GAIT DISTURBANCE: ICD-10-CM

## 2023-08-29 DIAGNOSIS — R26.89 BALANCE PROBLEM: ICD-10-CM

## 2023-08-29 PROCEDURE — 97530 THERAPEUTIC ACTIVITIES: CPT | Performed by: PHYSICAL THERAPIST

## 2023-08-29 PROCEDURE — 97110 THERAPEUTIC EXERCISES: CPT | Performed by: PHYSICAL THERAPIST

## 2023-08-29 PROCEDURE — 97112 NEUROMUSCULAR REEDUCATION: CPT | Performed by: PHYSICAL THERAPIST

## 2023-08-29 NOTE — PROGRESS NOTES
Outpatient Physical Therapy  [address] [facility], [place] [address]                            Physical Therapy Daily Treatment Note    Patient: Lj Crenshaw   : 1950  Diagnosis/ICD-10 Code:  Generalized weakness [R53.1]  Referring practitioner: Scott Hernandez III, MD  Date of Initial Visit: Type: THERAPY  Noted: 2023  Today's Date: 2023  Patient seen for 16 sessions           Subjective   Lj Crenshaw reports: that he hasn't had any falls over the last few days.     Objective   Increased fatigue in LE with continued standing exercises.    See Exercise, Manual, and Modality Logs for complete treatment.     Assessment/Plan  Lj progressing as evident by decreased falls and increased tolerance of PT session. Pt required Stand by Assist for Safety throughout PT session, especially during transitions. Pt would benefit from skilled PT to address strength and endurance deficits, transfer and gait training and any concerns with ADLs.       Progress per Plan of Care         Timed:  Manual Therapy:        mins  69912;  Therapeutic Exercise:    15     mins  67384;     Neuromuscular Reyna:    15    mins  03930;    Therapeutic Activity:     15     mins  07818;     Gait Training:           mins  25849;          Timed Treatment:   45   mins   Total Treatment:     45   mins      Electronically signed:   Trudy Mckay PTA  Physical Therapist Assistant  \[facility]\"" License #: [id number]

## 2023-08-31 ENCOUNTER — TREATMENT (OUTPATIENT)
Dept: PHYSICAL THERAPY | Facility: CLINIC | Age: 73
End: 2023-08-31
Payer: MEDICARE

## 2023-08-31 DIAGNOSIS — R27.8 COORDINATION IMPAIRMENT: ICD-10-CM

## 2023-08-31 DIAGNOSIS — R53.1 GENERALIZED WEAKNESS: Primary | ICD-10-CM

## 2023-08-31 DIAGNOSIS — R26.9 GAIT DISTURBANCE: ICD-10-CM

## 2023-08-31 DIAGNOSIS — R26.89 BALANCE PROBLEM: ICD-10-CM

## 2023-08-31 NOTE — PROGRESS NOTES
Outpatient Physical Therapy  [address] [facility], [place] [address]                            Physical Therapy Daily Treatment Note    Patient: Lj Crenshaw   : 1950  Diagnosis/ICD-10 Code:  Generalized weakness [R53.1]  Referring practitioner: Scott Hernandez III, MD  Date of Initial Visit: Type: THERAPY  Noted: 2023  Today's Date: 2023  Patient seen for 17 sessions           Subjective   Lj Crenshaw reports: no falls over the last few weeks. States that the new MD should be starting next week and then he can make a follow up appt soon after Dr Hernandez retires.     Objective   Decreased stability on first 2 sit to stand transfers.    See Exercise, Manual, and Modality Logs for complete treatment.     Assessment/Plan  Lj progressing as evident by decreased falls and increased tolerance of PT session. Pt required Stand by Assist for Safety throughout PT session, especially with standing activities. Pt would benefit from skilled PT to address strength and endurance deficits, transfer and gait training and any concerns with ADLs.       Progress per Plan of Care         Timed:  Manual Therapy:         mins  32521;  Therapeutic Exercise:    15     mins  11185;     Neuromuscular Reyna:    15    mins  02229;    Therapeutic Activity:     10     mins  81823;     Gait Training:           mins  67751;    Aquatic Therapy:          mins  28344;       Untimed:  Electrical Stimulation:         mins  67280 ( );  Mechanical Traction:         mins  02687;       Timed Treatment:   40   mins   Total Treatment:     40   mins      Electronically signed:     Trudy Mckay PTA  Physical Therapist Assistant  [place] [employer] License #: [id number]

## 2023-09-07 ENCOUNTER — TREATMENT (OUTPATIENT)
Dept: PHYSICAL THERAPY | Facility: CLINIC | Age: 73
End: 2023-09-07
Payer: MEDICARE

## 2023-09-07 DIAGNOSIS — R26.9 GAIT DISTURBANCE: ICD-10-CM

## 2023-09-07 DIAGNOSIS — R53.1 GENERALIZED WEAKNESS: Primary | ICD-10-CM

## 2023-09-07 DIAGNOSIS — R27.8 COORDINATION IMPAIRMENT: ICD-10-CM

## 2023-09-07 DIAGNOSIS — R26.89 BALANCE PROBLEM: ICD-10-CM

## 2023-09-07 PROCEDURE — 97110 THERAPEUTIC EXERCISES: CPT | Performed by: PHYSICAL THERAPIST

## 2023-09-07 PROCEDURE — 97530 THERAPEUTIC ACTIVITIES: CPT | Performed by: PHYSICAL THERAPIST

## 2023-09-07 PROCEDURE — 97112 NEUROMUSCULAR REEDUCATION: CPT | Performed by: PHYSICAL THERAPIST

## 2023-09-07 NOTE — PROGRESS NOTES
Outpatient Physical Therapy                   Physical Therapy Daily Treatment Note    Patient: Lj Crenshaw   : 1950  Diagnosis/ICD-10 Code:  Generalized weakness [R53.1]  Referring practitioner: Scott Hernandez III, MD  Date of Initial Visit: Type: THERAPY  Noted: 2023  Today's Date: 2023  Patient seen for 18 sessions             Subjective   Lj Crenshaw reports: no new complaints at time of arrival.     Objective     See Exercise, Manual, and Modality Logs for complete treatment.     Assessment/Plan  Pt progressing as evident by decreased falls and increased tolerance of PT session. Pt would benefit from skilled PT to address strength and endurance deficits, transfer and gait training and any concerns with ADLs.       Progress per Plan of Care      Timed:  Manual Therapy:    0     mins  45268;  Therapeutic Exercise:    15     mins  37431;     Neuromuscular Reyna:    8    mins  09363;    Therapeutic Activity:     17     mins  33840;     Gait Trainin     mins  56433;    Aquatic Therapy:     0     mins  32937;       Untimed:  Electrical Stimulation:    0     mins  68678 ( );  Mechanical Traction:    0     mins  92448;       Timed Treatment:   40   mins   Total Treatment:     40   mins      Electronically signed:   Katerina Marie PTA  Physical Therapist Assistant  [facility] License #: [id number]

## 2023-09-08 ENCOUNTER — TREATMENT (OUTPATIENT)
Dept: PHYSICAL THERAPY | Facility: CLINIC | Age: 73
End: 2023-09-08
Payer: MEDICARE

## 2023-09-08 DIAGNOSIS — R53.1 GENERALIZED WEAKNESS: Primary | ICD-10-CM

## 2023-09-08 DIAGNOSIS — R26.89 BALANCE PROBLEM: ICD-10-CM

## 2023-09-08 DIAGNOSIS — R26.9 GAIT DISTURBANCE: ICD-10-CM

## 2023-09-08 DIAGNOSIS — R27.8 COORDINATION IMPAIRMENT: ICD-10-CM

## 2023-09-08 NOTE — PROGRESS NOTES
Outpatient Physical Therapy  [address] [facility] [place] [address]                            Physical Therapy Daily Treatment Note    Patient: Lj Crenshaw   : 1950  Diagnosis/ICD-10 Code:  Generalized weakness [R53.1]  Referring practitioner: Scott Hernandez III, MD  Date of Initial Visit: Type: THERAPY  Noted: 2023  Today's Date: 2023  Patient seen for 19 sessions           Subjective   Lj Crenshaw reports: no falls over the last few days, states that his legs are a little more sore after having PT yesterday    Objective   No LOB throughout PT session.    See Exercise, Manual, and Modality Logs for complete treatment.     Assessment/Plan  Lj progressing as evident by decreased falls and increased tolerance of PT session. Pt required no assistance throughout PT session. Pt would benefit from skilled PT to address strength and endurance deficits, transfer and gait training and any concerns with ADLs.       Progress per Plan of Care         Timed:  Manual Therapy:         mins  18801;  Therapeutic Exercise:    15     mins  33437;     Neuromuscular Reyna:    15    mins  93955;    Therapeutic Activity:     15     mins  98347;     Gait Training:           mins  29262;    Aquatic Therapy:          mins  33799;       Untimed:  Electrical Stimulation:         mins  42211 ( );  Mechanical Traction:         mins  51544;       Timed Treatment:   45   mins   Total Treatment:     45   mins      Electronically signed:     Trudy Mckay PTA  Physical Therapist Assistant  [facility] License #: [id number]

## 2023-09-11 ENCOUNTER — TREATMENT (OUTPATIENT)
Dept: PHYSICAL THERAPY | Facility: CLINIC | Age: 73
End: 2023-09-11
Payer: MEDICARE

## 2023-09-11 DIAGNOSIS — R27.8 COORDINATION IMPAIRMENT: ICD-10-CM

## 2023-09-11 DIAGNOSIS — R26.9 GAIT DISTURBANCE: ICD-10-CM

## 2023-09-11 DIAGNOSIS — R53.1 GENERALIZED WEAKNESS: Primary | ICD-10-CM

## 2023-09-11 DIAGNOSIS — R26.89 BALANCE PROBLEM: ICD-10-CM

## 2023-09-11 NOTE — PROGRESS NOTES
Outpatient Physical Therapy  [address] [facility], [place] [address]                            Physical Therapy Daily Treatment Note    Patient: Lj Crenshaw   : 1950  Diagnosis/ICD-10 Code:  Generalized weakness [R53.1]  Referring practitioner: Scott Hernandez III, MD  Date of Initial Visit: Type: THERAPY  Noted: 2023  Today's Date: 2023  Patient seen for 20 sessions           Subjective   Lj Crenshaw reports: no falls over the last few days, states that he needs to call primary care to make an appt with new MD.     Objective   No LOB throughout PT session.    See Exercise, Manual, and Modality Logs for complete treatment.     Assessment/Plan  Lj progressing as evident by decreased falls and increased tolerance of PT session. Pt required no assistance throughout PT session, no LOB. Pt would benefit from skilled PT to address strength and endurance deficits, transfer and gait training and any concerns with ADLs.       Progress per Plan of Care         Timed:  Manual Therapy:         mins  46678;  Therapeutic Exercise:    15     mins  31335;     Neuromuscular Reyna:    15    mins  43829;    Therapeutic Activity:     15     mins  27251;     Gait Training:           mins  65670;    Aquatic Therapy:          mins  54983;       Untimed:  Electrical Stimulation:         mins  12431 ( );  Mechanical Traction:         mins  10007;       Timed Treatment:   45   mins   Total Treatment:     45   mins      Electronically signed:     Trudy Mckay PTA  Physical Therapist Assistant  [facility] License #: [id number]

## 2023-09-15 ENCOUNTER — TREATMENT (OUTPATIENT)
Dept: PHYSICAL THERAPY | Facility: CLINIC | Age: 73
End: 2023-09-15
Payer: MEDICARE

## 2023-09-15 DIAGNOSIS — R26.9 GAIT DISTURBANCE: ICD-10-CM

## 2023-09-15 DIAGNOSIS — R27.8 COORDINATION IMPAIRMENT: ICD-10-CM

## 2023-09-15 DIAGNOSIS — R26.89 BALANCE PROBLEM: ICD-10-CM

## 2023-09-15 DIAGNOSIS — R53.1 GENERALIZED WEAKNESS: Primary | ICD-10-CM

## 2023-09-15 NOTE — PROGRESS NOTES
Outpatient Physical Therapy  [address] [facility], [place] [address]                            Physical Therapy Daily Treatment Note    Patient: Lj Crenshaw   : 1950  Diagnosis/ICD-10 Code:  Generalized weakness [R53.1]  Referring practitioner: Scott Hernandez III, MD  Date of Initial Visit: Type: THERAPY  Noted: 2023  Today's Date: 9/15/2023  Patient seen for 21 sessions           Subjective   Lj Crenshaw reports: no falls over the last few days, states that his legs are feeling pretty good. He does have appt with new MD on Monday.    Objective   No LOB, just general fatigue.     See Exercise, Manual, and Modality Logs for complete treatment.     Assessment/Plan  Lj progressing as evident by decreased falls and increased tolerance of PT session. Pt required no assistance throughout PT session, just general fatigue. Pt would benefit from skilled PT to address strength and endurance deficits, transfer and gait training and any concerns with ADLs.       Progress per Plan of Care         Timed:  Manual Therapy:         mins  95384;  Therapeutic Exercise:    15     mins  18657;     Neuromuscular Reyna:    15    mins  62655;    Therapeutic Activity:     15     mins  23646;     Gait Training:           mins  86941;    Aquatic Therapy:          mins  87278;       Untimed:  Electrical Stimulation:         mins  35970 ( );  Mechanical Traction:         mins  34277;       Timed Treatment:   45   mins   Total Treatment:     45   mins      Electronically signed:     Trudy Mckay PTA  Physical Therapist Assistant  [facility] License #: [id number]

## 2023-09-18 ENCOUNTER — OFFICE VISIT (OUTPATIENT)
Dept: FAMILY MEDICINE CLINIC | Facility: CLINIC | Age: 73
End: 2023-09-18
Payer: MEDICARE

## 2023-09-18 VITALS
TEMPERATURE: 97.3 F | SYSTOLIC BLOOD PRESSURE: 117 MMHG | HEART RATE: 101 BPM | DIASTOLIC BLOOD PRESSURE: 75 MMHG | BODY MASS INDEX: 31.7 KG/M2 | WEIGHT: 274 LBS | OXYGEN SATURATION: 91 % | HEIGHT: 78 IN

## 2023-09-18 DIAGNOSIS — F17.200 SMOKER: ICD-10-CM

## 2023-09-18 DIAGNOSIS — Z23 NEED FOR TETANUS BOOSTER: ICD-10-CM

## 2023-09-18 DIAGNOSIS — Z76.89 ESTABLISHING CARE WITH NEW DOCTOR, ENCOUNTER FOR: Primary | ICD-10-CM

## 2023-09-18 DIAGNOSIS — R26.89 BALANCE DISORDER: ICD-10-CM

## 2023-09-18 DIAGNOSIS — E78.2 MIXED HYPERLIPIDEMIA: ICD-10-CM

## 2023-09-18 DIAGNOSIS — I10 ESSENTIAL HYPERTENSION: ICD-10-CM

## 2023-09-18 DIAGNOSIS — E66.9 OBESITY (BMI 30-39.9): ICD-10-CM

## 2023-09-18 PROCEDURE — 90471 IMMUNIZATION ADMIN: CPT | Performed by: STUDENT IN AN ORGANIZED HEALTH CARE EDUCATION/TRAINING PROGRAM

## 2023-09-18 PROCEDURE — 1160F RVW MEDS BY RX/DR IN RCRD: CPT | Performed by: STUDENT IN AN ORGANIZED HEALTH CARE EDUCATION/TRAINING PROGRAM

## 2023-09-18 PROCEDURE — 99214 OFFICE O/P EST MOD 30 MIN: CPT | Performed by: STUDENT IN AN ORGANIZED HEALTH CARE EDUCATION/TRAINING PROGRAM

## 2023-09-18 PROCEDURE — 90715 TDAP VACCINE 7 YRS/> IM: CPT | Performed by: STUDENT IN AN ORGANIZED HEALTH CARE EDUCATION/TRAINING PROGRAM

## 2023-09-18 PROCEDURE — 1159F MED LIST DOCD IN RCRD: CPT | Performed by: STUDENT IN AN ORGANIZED HEALTH CARE EDUCATION/TRAINING PROGRAM

## 2023-09-18 PROCEDURE — 3078F DIAST BP <80 MM HG: CPT | Performed by: STUDENT IN AN ORGANIZED HEALTH CARE EDUCATION/TRAINING PROGRAM

## 2023-09-18 PROCEDURE — 3074F SYST BP LT 130 MM HG: CPT | Performed by: STUDENT IN AN ORGANIZED HEALTH CARE EDUCATION/TRAINING PROGRAM

## 2023-09-18 RX ORDER — ROSUVASTATIN CALCIUM 40 MG/1
40 TABLET, COATED ORAL DAILY
Qty: 90 TABLET | Refills: 3 | Status: SHIPPED | OUTPATIENT
Start: 2023-09-18

## 2023-09-18 RX ORDER — LISINOPRIL AND HYDROCHLOROTHIAZIDE 12.5; 1 MG/1; MG/1
1 TABLET ORAL DAILY
Qty: 90 TABLET | Refills: 3 | Status: SHIPPED | OUTPATIENT
Start: 2023-09-18

## 2023-09-18 NOTE — PROGRESS NOTES
New Patient Office Visit      Patient Name: Lj Crenshaw  : 1950   MRN: [id number]     Chief Complaint:    Chief Complaint   Patient presents with    Establish Care    Hyperlipidemia    Hypertension       History of Present Illness: Lj Crenshaw is a 73 y.o. male right-handed  male with past medical history significant for hypertension, osteoarthritis and sarcoidosis,  who is here today to establish care, previous PCP Dr. Hernandez. He has been going to physical therapy for drop foot, right leg. He said they are concerned about his walking, he is currently using a walker. This has been an ongoing problem since about 2019. He used walk with a cane, but is now using a walker.                Subjective      Review of Systems:   Review of Systems   Constitutional:  Negative for activity change, diaphoresis and fever.   HENT:  Negative for ear pain.    Eyes:  Negative for pain.   Respiratory:  Negative for stridor.    Endocrine: Negative for polydipsia.   Genitourinary:  Negative for frequency.      Past Medical History:   Past Medical History:   Diagnosis Date    Hyperlipidemia        Past Surgical History:   Past Surgical History:   Procedure Laterality Date    EYE SURGERY      KNEE SURGERY         Family History:   Family History   Problem Relation Age of Onset    Heart disease Brother        Social History:   Social History     Socioeconomic History    Marital status:    Tobacco Use    Smoking status: Former     Packs/day: 1.00     Years: 11.00     Pack years: 11.00     Types: Cigarettes     Start date:      Quit date: 2016     Years since quittin.7    Smokeless tobacco: Never   Vaping Use    Vaping Use: Never used   Substance and Sexual Activity    Alcohol use: Yes     Alcohol/week: 1.0 standard drink     Types: 1 Cans of beer per week     Comment: only if goes out to eat    Drug use: Never    Sexual activity: Defer       Medications:     Current Outpatient Medications:  "    lisinopril-hydrochlorothiazide (PRINZIDE,ZESTORETIC) 10-12.5 MG per tablet, Take 1 tablet by mouth Daily., Disp: 90 tablet, Rfl: 3    rosuvastatin (Crestor) 40 MG tablet, Take 1 tablet by mouth Daily. generic, Disp: 90 tablet, Rfl: 3    Cyanocobalamin 1000 MCG sublingual tablet, Place 1 tablet under the tongue Daily., Disp: , Rfl:     ketorolac (ACULAR) 0.5 % ophthalmic solution, , Disp: , Rfl:     prednisoLONE acetate (PRED FORTE) 1 % ophthalmic suspension, 1 drop 4 (Four) Times a Day., Disp: , Rfl:     tadalafil (CIALIS) 20 MG tablet, TAKE ONE TABLET BY MOUTH AS NEEDED APPROXIMATELY 1 HOUR BEFORE SEXUAL ACTIVITY, Disp: 30 tablet, Rfl: 0    Allergies:   No Known Allergies    Objective     Physical Exam:  Vital Signs:   Vitals:    09/18/23 1455   BP: 117/75   BP Location: Left arm   Patient Position: Sitting   Pulse: 101   Temp: 97.3 °F (36.3 °C)   SpO2: 91%   Weight: 124 kg (274 lb)   Height: 198.1 cm (78\")     Body mass index is 31.66 kg/m².     Physical Exam  HENT:      Head: Normocephalic.      Mouth/Throat:      Mouth: Mucous membranes are moist.   Eyes:      Pupils: Pupils are equal, round, and reactive to light.   Cardiovascular:      Rate and Rhythm: Normal rate.   Pulmonary:      Effort: Pulmonary effort is normal.   Abdominal:      General: Abdomen is flat.   Musculoskeletal:         General: Normal range of motion.      Cervical back: Normal range of motion.   Skin:     General: Skin is warm.      Capillary Refill: Capillary refill takes less than 2 seconds.   Neurological:      General: No focal deficit present.      Mental Status: He is alert. Mental status is at baseline.   Walks with a walker            Assessment / Plan      Assessment/Plan:   Diagnoses and all orders for this visit:    1. Establishing care with new doctor, encounter for (Primary)  -     Cancel: Ambulatory Referral to Physical Medicine Rehab  -     [place] aaa screen limited; Future  -     CBC & Differential; Future  -     " Comprehensive Metabolic Panel; Future  -     Lipid Panel; Future  -     Ambulatory Referral to Physical Medicine Rehab    2. Mixed hyperlipidemia  -     [place] aaa screen limited; Future  -     rosuvastatin (Crestor) 40 MG tablet; Take 1 tablet by mouth Daily. generic  Dispense: 90 tablet; Refill: 3  -     Lipid Panel; Future    3. Essential hypertension  -     [place] aaa screen limited; Future  -     lisinopril-hydrochlorothiazide (PRINZIDE,ZESTORETIC) 10-12.5 MG per tablet; Take 1 tablet by mouth Daily.  Dispense: 90 tablet; Refill: 3    4. Smoker  -     CBC & Differential; Future    5. Balance disorder     - PM&R to be schedule at [facility]  Other orders  -     Tdap Vaccine => 6yo IM (BOOSTRIX)     Obesity      BMI 31.66 patient was advised to loss weight and exercise.           Follow Up:   Return in about 3 months (around 12/18/2023).

## 2023-09-20 ENCOUNTER — TREATMENT (OUTPATIENT)
Dept: PHYSICAL THERAPY | Facility: CLINIC | Age: 73
End: 2023-09-20
Payer: MEDICARE

## 2023-09-20 DIAGNOSIS — R26.89 BALANCE PROBLEM: ICD-10-CM

## 2023-09-20 DIAGNOSIS — R26.9 GAIT DISTURBANCE: ICD-10-CM

## 2023-09-20 DIAGNOSIS — R27.8 COORDINATION IMPAIRMENT: ICD-10-CM

## 2023-09-20 DIAGNOSIS — R53.1 GENERALIZED WEAKNESS: Primary | ICD-10-CM

## 2023-09-20 NOTE — PROGRESS NOTES
Physical Therapy Daily Note  [address] [place] [address]    VISIT#: 22    Subjective   Lj Crenshaw reports he had appointment with new PCP and reports he will be getting referred to physical medicine rehab.       Objective     See Exercise, Manual, and Modality Logs for complete treatment.     Assessment/Plan    Continued to progress patient's strengthening and balance exercises and patient tolerated well. Pt's B LE did become fatigue with standing exercises. Pt had more core control with sitting core strengthening exercises. Pt required verbal and tactile cues with seated hip flexion and LAQ to decrease lumbar extension. Will continue to progress patient as able.     Progress per Plan of Care and Progress strengthening /stabilization /functional activity            Timed:                 Manual Therapy:    0     mins  66914;     Therapeutic Exercise:    18     mins  46311;     Neuromuscular Reyna:    12    mins  29173;    Therapeutic Activity:     15     mins  79121;     Gait Trainin     mins  59156;     Ultrasound:     0     mins  57785;    Ionto                               0    mins   30406  Self pay                         0     mins [email or web address]    Un-Timed:  Electrical Stimulation:    0     mins  97680 ( )  Canalith Repos    0     mins 37257  Dry Needling     0     mins self-pay  Traction     0     mins 77120    Timed Treatment:   45   mins   Total Treatment:     45   mins    Tal Guerra PT  Physical Therapist    PT SIGNATURE: Electronically signed by Tal Guerra PT  [place] LICENSE: [id number]

## 2023-09-22 ENCOUNTER — TREATMENT (OUTPATIENT)
Dept: PHYSICAL THERAPY | Facility: CLINIC | Age: 73
End: 2023-09-22
Payer: MEDICARE

## 2023-09-22 DIAGNOSIS — R27.8 COORDINATION IMPAIRMENT: ICD-10-CM

## 2023-09-22 DIAGNOSIS — R26.9 GAIT DISTURBANCE: ICD-10-CM

## 2023-09-22 DIAGNOSIS — R26.89 BALANCE PROBLEM: ICD-10-CM

## 2023-09-22 DIAGNOSIS — R53.1 GENERALIZED WEAKNESS: Primary | ICD-10-CM

## 2023-09-22 NOTE — PROGRESS NOTES
Re-Assessment / Re-Certification  [address] [place] [id number]      Patient: Lj Crenshaw   : 1950  Diagnosis/ICD-10 Code:  Generalized weakness [R53.1]  Referring practitioner: Scott Hernandez III, MD  Date of Initial Visit: Type: THERAPY  Noted: 2023  Today's Date: 2023  Patient seen for 23 sessions      Subjective:   Lj Crenshaw reports: 0/10  Subjective Questionnaire: 30 second STS with UE support: 5.5 times at 22.5 inch table; TU seconds with rollator   Clinical Progress: improved  Home Program Compliance: Yes  Treatment has included: therapeutic exercise, neuromuscular re-education, manual therapy, therapeutic activity, and gait training    Subjective Pt reports that he hasn't gone up or down any ramps since last progress note. Pt reports he has had no falls since last progress note.    Objective     Strength/Myotome Testing     Left Shoulder      Planes of Motion   Flexion: 5  Abduction: 5  External rotation at 0°: 5  Internal rotation at 0°: 5     Isolated Muscles   Biceps: 5  Triceps: 5    Right Shoulder      Planes of Motion   Flexion: 5  Abduction: 5  External rotation at 0°: 5  Internal rotation at 0°: 5     Isolated Muscles   Biceps: 5  Triceps: 5     Left Hip   Planes of Motion   Flexion: 3-  Abduction: 3-  Adduction: 3-     Right Hip   Planes of Motion   Flexion: 3-  Abduction: 3-  Adduction: 3-     Left Knee   Flexion: 4+  Extension: 4+     Right Knee   Flexion: 4  Extension: 4     Left Ankle/Foot   Dorsiflexion: 0     Right Ankle/Foot   Dorsiflexion: 0    Feet shoulder width apart: 30 seconds  Feet closer then shoulder width but not completely together: 20 seconds  Unable to perform feet completely together    Pt ambulates with 3 wheeled walker with forward flexed posture and decrease in stride length and heel strike.    Assessment/Plan    Pt has made improvement in his functional mobility measures such at an improvement in 2.5 STS in 30 second STS test  and a 0.9 second improvement in TUG. Pt also did make significant improvements in UE strength and slight improvements with LE strength since last progress note. Pt still requires skilled PT in order to address strength, balance, coordination, and gait deficits. Will continue to progress patient as able.       Plan Goals:   1. Mobility: Walking/Moving Around Functional Limitation                               LTG 1: 12 weeks:  The patient will demonstrate TUG score <16 seconds for reduced fall risk.                           STATUS:  IN PROGRESS              STG 1a: 6 weeks:  The patient will demonstrate TUG score <18 seconds for reduced fall risk.                           STATUS:  IN PROGRESS    2. The patient has limited strength of the B shoulders and B hips.              LTG 2: 12 weeks:  The patient will demonstrate 4+ /5 strength for B shoulder flexion, abduction, external rotation, and internal rotation in order to demonstrate improved shoulder stability.                          STATUS:  MET              LTG 2a: 12 weeks:  The patient will demonstrate 4+/5 strength for B hip flexion, abduction, and extension in order to improve hip stability.                          STATUS:  IN PROGRESS    3. The patient has gait dysfunction.              LTG 3: 12 weeks:  The patient will demonstrate > 22 / 30 on the Functional Gait Assessment for improved functional mobility.                            STATUS:  IN PROGRESS              STG 3a: The patient will demonstrate > 18 / 30 on the Functional Gait Assessment for improved functional mobility.                            STATUS:  IN PROGRESS     4. The patient has difficulty with balance.               LTG 4: 12 weeks: The patient will hold the sharpened romberg position on open with eyes open for 20 seconds in order to improve balance and decrease risk of falling.                           STATUS: IN PROGRESS              STG 4: 6 weeks: The patient will hold the  romberg position on foam with eyes closed for 20 seconds in order to improve balance and decrease risk of falling.                           STATUS: IN PROGRESS     Progress toward previous goals: Partially Met    See Exercise, Manual, and Modality Logs for complete treatment.         Recommendations: Continue as planned  Timeframe:2x a week for 6 weeks  Prognosis to achieve goals: good    PT Signature: Electronically signed by Tal Guerra PT  [place] LICENSE: [id number]      Based upon review of the patient's progress and continued therapy plan, it is my medical opinion that Lj Crenshaw should continue physical therapy treatment at [facility] PHYSICAL THERAPY  [address] [facility]  [place] [address] [phone].    Signature: __________________________________  Scott Hernandez III, MD    Timed:           Timed:         Manual Therapy:    0     mins  62932;     Therapeutic Exercise:    0     mins  03841;     Neuromuscular Reyna:    0    mins  70049;    Therapeutic Activity:     25     mins  76061;     Gait Trainin     mins  02768;     Ultrasound:     0     mins  47758;    Ionto                               0    mins   06292  Self pay                         0     mins [email or web address]    Un-Timed:  Electrical Stimulation:    0     mins  74383 (MC )  Canalith Repos    0     mins 42658  Dry Needling     0     mins self-pay  Traction     0     mins 86818  Re-Eval                           0    mins  94577    Timed Treatment:   25   mins   Total Treatment:     25   mins    PT SIGNATURE: Electronically signed by Tal Guerra PT  [place] LICENSE: [id number]     DATE TREATMENT INITIATED: 2023    90 Day Recertification  Certification Period: 2023 thru 2023  I certify that the therapy services are furnished while this patient is under my care.  The services outlined above are required by this patient, and will be reviewed every 90 days.     PHYSICIAN: Scott Hernandez III,  MD   NPI: [id number]      DATE:     Please sign and return via fax to [phone] Thank you, [facility] Physical Therapy.

## 2023-09-26 ENCOUNTER — TELEPHONE (OUTPATIENT)
Dept: FAMILY MEDICINE CLINIC | Facility: CLINIC | Age: 73
End: 2023-09-26
Payer: COMMERCIAL

## 2023-09-26 ENCOUNTER — TREATMENT (OUTPATIENT)
Dept: PHYSICAL THERAPY | Facility: CLINIC | Age: 73
End: 2023-09-26
Payer: MEDICARE

## 2023-09-26 DIAGNOSIS — R26.9 GAIT DISTURBANCE: ICD-10-CM

## 2023-09-26 DIAGNOSIS — R27.8 COORDINATION IMPAIRMENT: ICD-10-CM

## 2023-09-26 DIAGNOSIS — R26.89 BALANCE PROBLEM: ICD-10-CM

## 2023-09-26 DIAGNOSIS — R53.1 GENERALIZED WEAKNESS: Primary | ICD-10-CM

## 2023-09-26 NOTE — PROGRESS NOTES
Physical Therapy Daily Note  [address] [place] [address]    VISIT#: 24    Subjective   Lj Crenshaw reports no new complaints today.       Objective     See Exercise, Manual, and Modality Logs for complete treatment.     Assessment/Plan    Continued with progressing patient's strengthening exercises. Pt had significant difficulty with bolster bridges today and required tactile cues to decrease hip ER while bridging secondary to weakness. Pt required cues with squats today to shift weight more into heels as patient had a tendency to squat while on toes and caused B knees to buckle. Pt has increase in difficulty with ball squeezes secondary to adductor weakness. Will continue to progress patient as able.     Progress per Plan of Care and Progress strengthening /stabilization /functional activity            Timed:                 Manual Therapy:    0     mins  02046;     Therapeutic Exercise:    18     mins  78284;     Neuromuscular Reyna:    0    mins  83955;    Therapeutic Activity:     12     mins  14717;     Gait Trainin     mins  13342;     Ultrasound:     0     mins  41311;    Ionto                               0    mins   58921  Self pay                         0     mins [email or web address]    Un-Timed:  Electrical Stimulation:    0     mins  03219 ( )  Canalith Repos    0     mins 14401  Dry Needling     0     mins self-pay  Traction     0     mins 96400    Timed Treatment:   30   mins   Total Treatment:     30   mins    Tal Guerra PT  Physical Therapist    PT SIGNATURE: Electronically signed by Tal Guerra PT  [place] LICENSE: [id number]

## 2023-09-28 ENCOUNTER — TREATMENT (OUTPATIENT)
Dept: PHYSICAL THERAPY | Facility: CLINIC | Age: 73
End: 2023-09-28
Payer: MEDICARE

## 2023-09-28 DIAGNOSIS — R27.8 COORDINATION IMPAIRMENT: ICD-10-CM

## 2023-09-28 DIAGNOSIS — R53.1 GENERALIZED WEAKNESS: Primary | ICD-10-CM

## 2023-09-28 DIAGNOSIS — R26.9 GAIT DISTURBANCE: ICD-10-CM

## 2023-09-28 DIAGNOSIS — R26.89 BALANCE PROBLEM: ICD-10-CM

## 2023-09-28 NOTE — PROGRESS NOTES
Physical Therapy Daily Note  [address] [place] [address]    VISIT#: 25    Subjective   Lj Crenshaw reports 0/10. Pt reports no new complaints today.       Objective     See Exercise, Manual, and Modality Logs for complete treatment.     Assessment/Plan    Continued with progressing patient's strengthening, core stability, and balance exercises and patient tolerated well. Pt required cues with hip extension exercise to maintain upright posture as patient has tendency to flex forward secondary to glute weakness. Will continue to progress patient as able to improve strength and functional mobility.     Progress per Plan of Care and Progress strengthening /stabilization /functional activity            Timed:                 Manual Therapy:    0     mins  42947;     Therapeutic Exercise:    15     mins  11484;     Neuromuscular Reyna:    0    mins  04915;    Therapeutic Activity:     15     mins  76304;     Gait Trainin     mins  90561;     Ultrasound:     0     mins  94709;    Ionto                               0    mins   47466  Self pay                         0     mins [email or web address]    Un-Timed:  Electrical Stimulation:    0     mins  65617 (MC )  Canalith Repos    0     mins 07439  Dry Needling     0     mins self-pay  Traction     0     mins 34777    Timed Treatment:   30   mins   Total Treatment:     30   mins    Tal Guerra PT  Physical Therapist    PT SIGNATURE: Electronically signed by Tal Guerra PT  [place] LICENSE: [id number]

## 2023-10-02 ENCOUNTER — TREATMENT (OUTPATIENT)
Dept: PHYSICAL THERAPY | Facility: CLINIC | Age: 73
End: 2023-10-02
Payer: MEDICARE

## 2023-10-02 DIAGNOSIS — R27.8 COORDINATION IMPAIRMENT: ICD-10-CM

## 2023-10-02 DIAGNOSIS — R26.9 GAIT DISTURBANCE: ICD-10-CM

## 2023-10-02 DIAGNOSIS — R53.1 GENERALIZED WEAKNESS: Primary | ICD-10-CM

## 2023-10-02 DIAGNOSIS — R26.89 BALANCE PROBLEM: ICD-10-CM

## 2023-10-02 PROCEDURE — 97112 NEUROMUSCULAR REEDUCATION: CPT | Performed by: PHYSICAL THERAPIST

## 2023-10-02 PROCEDURE — 97110 THERAPEUTIC EXERCISES: CPT | Performed by: PHYSICAL THERAPIST

## 2023-10-02 PROCEDURE — 97530 THERAPEUTIC ACTIVITIES: CPT | Performed by: PHYSICAL THERAPIST

## 2023-10-02 NOTE — PROGRESS NOTES
Outpatient Physical Therapy  [address] [facility], [place] [address]                            Physical Therapy Daily Treatment Note    Patient: Lj Crenshaw   : 1950  Diagnosis/ICD-10 Code:  Generalized weakness [R53.1]  Referring practitioner: Soctt Hernandez III, MD  Date of Initial Visit: Type: THERAPY  Noted: 2023  Today's Date: 10/2/2023  Patient seen for 26 sessions           Subjective   Lj Crenshaw reports: that this time of day is a little rough on him. States that he wore different shoes to [facility] yesterday and going down the ramp was harder and maybe him a little hesitant.   He is going to [facility] in [place] for a new medication treatment.    Objective   Increased fatigue throughout PT session, alternating between standing and sitting exercises.    See Exercise, Manual, and Modality Logs for complete treatment.     Assessment/Plan  Lj progressing as evident by decreased falls and increased tolerance of PT session. Pt required no assistance throughout PT session, increased fatigue. Pt would benefit from skilled PT to address strength and endurance deficits, transfer and gait training and any concerns with ADLs.       Progress per Plan of Care         Timed:  Manual Therapy:        mins  75838;  Therapeutic Exercise:    15     mins  89248;     Neuromuscular Reyna:    15    mins  68411;    Therapeutic Activity:     15     mins  06709;     Gait Training:           mins  54526;          Timed Treatment:   45   mins   Total Treatment:     45   mins      Electronically signed:   Trudy Mckay PTA  Physical Therapist Assistant  [facility] License #: [id number]

## 2023-10-05 ENCOUNTER — TREATMENT (OUTPATIENT)
Dept: PHYSICAL THERAPY | Facility: CLINIC | Age: 73
End: 2023-10-05
Payer: MEDICARE

## 2023-10-05 DIAGNOSIS — R26.9 GAIT DISTURBANCE: ICD-10-CM

## 2023-10-05 DIAGNOSIS — R53.1 GENERALIZED WEAKNESS: Primary | ICD-10-CM

## 2023-10-05 DIAGNOSIS — R26.89 BALANCE PROBLEM: ICD-10-CM

## 2023-10-05 DIAGNOSIS — R27.8 COORDINATION IMPAIRMENT: ICD-10-CM

## 2023-10-05 NOTE — PROGRESS NOTES
Outpatient Physical Therapy  [address] [facility], [place] [address]                            Physical Therapy Daily Treatment Note    Patient: Lj Crenshaw   : 1950  Diagnosis/ICD-10 Code:  Generalized weakness [R53.1]  Referring practitioner: Scott Hernandez III, MD  Date of Initial Visit: Type: THERAPY  Noted: 2023  Today's Date: 10/5/2023  Patient seen for 27 sessions           Subjective   Lj Crenshaw reports: no falls over the last few days.     Objective   Minimal fatigue in legs with standing exercises.    See Exercise, Manual, and Modality Logs for complete treatment.     Assessment/Plan  Lj progressing as evident by decreased falls and increased tolerance of PT session. Pt required Stand by Assist for Safety throughout PT session, no LOB. Pt would benefit from skilled PT to address strength and endurance deficits, transfer and gait training and any concerns with ADLs.       Progress per Plan of Care         Timed:  Manual Therapy:         mins  60055;  Therapeutic Exercise:    15     mins  20714;     Neuromuscular Reyna:    15    mins  51438;    Therapeutic Activity:     15     mins  43042;     Gait Training:           mins  24605;    Aquatic Therapy:          mins  31917;       Untimed:  Electrical Stimulation:         mins  19736 ( );  Mechanical Traction:         mins  74595;       Timed Treatment:   45   mins   Total Treatment:     45   mins      Electronically signed:     Trudy Mckay PTA  Physical Therapist Assistant  [facility] License #: [id number]

## 2023-10-06 ENCOUNTER — HOSPITAL ENCOUNTER (OUTPATIENT)
Dept: ULTRASOUND IMAGING | Facility: HOSPITAL | Age: 73
Discharge: HOME | End: 2023-10-06
Admitting: STUDENT IN AN ORGANIZED HEALTH CARE EDUCATION/TRAINING PROGRAM
Payer: MEDICARE

## 2023-10-06 DIAGNOSIS — E78.2 MIXED HYPERLIPIDEMIA: ICD-10-CM

## 2023-10-06 DIAGNOSIS — I10 ESSENTIAL HYPERTENSION: ICD-10-CM

## 2023-10-06 DIAGNOSIS — F17.200 SMOKER: ICD-10-CM

## 2023-10-06 PROCEDURE — 76706 US ABDL AORTA SCREEN AAA: CPT

## 2023-10-09 ENCOUNTER — TELEPHONE (OUTPATIENT)
Dept: FAMILY MEDICINE CLINIC | Facility: CLINIC | Age: 73
End: 2023-10-09
Payer: COMMERCIAL

## 2023-10-09 NOTE — TELEPHONE ENCOUNTER
"Caller: Lj Crenshaw \"Poli\"    Relationship: Self    Best call back number: [phone]     What orders are you requesting (i.e. lab or imaging): OUTDOOR SCOOTER    In what timeframe would the patient need to come in: ASAP    Additional notes: PATIENT CALLED STATING THAT AN ORDER WAS SENT IN FOR A SCOOTER THAT CAN BE USED INDOORS, HE DOES NOT NEED AN INDOOR SCOOTER BUT AN OUTDOOR SCOOTER TO BE ORDERED.  "

## 2023-10-09 NOTE — TELEPHONE ENCOUNTER
Patient called office regarding new scooter.  I called [facility] and spoke with Mariposa.  Was told that his insurance will not pay for the scooter.  It has to be able to move around in the house also.  I instructed patient and he is going to call [facility] back and speak with them

## 2023-10-11 ENCOUNTER — TREATMENT (OUTPATIENT)
Dept: PHYSICAL THERAPY | Facility: CLINIC | Age: 73
End: 2023-10-11
Payer: MEDICARE

## 2023-10-11 DIAGNOSIS — R53.1 GENERALIZED WEAKNESS: Primary | ICD-10-CM

## 2023-10-11 DIAGNOSIS — R26.9 GAIT DISTURBANCE: ICD-10-CM

## 2023-10-11 DIAGNOSIS — R27.8 COORDINATION IMPAIRMENT: ICD-10-CM

## 2023-10-11 DIAGNOSIS — R26.89 BALANCE PROBLEM: ICD-10-CM

## 2023-10-11 PROCEDURE — 97110 THERAPEUTIC EXERCISES: CPT | Performed by: PHYSICAL THERAPIST

## 2023-10-11 PROCEDURE — 97112 NEUROMUSCULAR REEDUCATION: CPT | Performed by: PHYSICAL THERAPIST

## 2023-10-11 PROCEDURE — 97530 THERAPEUTIC ACTIVITIES: CPT | Performed by: PHYSICAL THERAPIST

## 2023-10-11 NOTE — PROGRESS NOTES
Outpatient Physical Therapy  [address] [facility] [place] [address]                            Physical Therapy Daily Treatment Note    Patient: jL Crenshaw   : 1950  Diagnosis/ICD-10 Code:  Generalized weakness [R53.1]  Referring practitioner: Scott Hernandez III, MD  Date of Initial Visit: Type: THERAPY  Noted: 2023  Today's Date: 10/11/2023  Patient seen for 28 sessions           Subjective   Lj Crenshaw reports: no falls over the last week. Plans to call [facility] back after last scheduled PT session on the .     Objective   No LOB.    See Exercise, Manual, and Modality Logs for complete treatment.     Assessment/Plan  Lj progressing as evident by decreased falls and increased tolerance of PT session. Pt required Stand by Assist for Safety throughout PT session, no LOB. . Pt would benefit from skilled PT to address strength and endurance deficits, transfer and gait training and any concerns with ADLs.       Progress per Plan of Care         Timed:  Manual Therapy:         mins  77729;  Therapeutic Exercise:    15     mins  83973;     Neuromuscular Reyna:    15    mins  46858;    Therapeutic Activity:     15     mins  57835;     Gait Training:           mins  09129;    Aquatic Therapy:          mins  88769;       Untimed:  Electrical Stimulation:         mins  75827 ( );  Mechanical Traction:         mins  72110;       Timed Treatment:   45   mins   Total Treatment:     45   mins      Electronically signed:     Trudy Mckay PTA  Physical Therapist Assistant  [facility] License #: [id number]

## 2023-10-13 ENCOUNTER — TREATMENT (OUTPATIENT)
Dept: PHYSICAL THERAPY | Facility: CLINIC | Age: 73
End: 2023-10-13
Payer: MEDICARE

## 2023-10-13 DIAGNOSIS — R26.89 BALANCE PROBLEM: ICD-10-CM

## 2023-10-13 DIAGNOSIS — R26.9 GAIT DISTURBANCE: ICD-10-CM

## 2023-10-13 DIAGNOSIS — R27.8 COORDINATION IMPAIRMENT: ICD-10-CM

## 2023-10-13 DIAGNOSIS — R53.1 GENERALIZED WEAKNESS: Primary | ICD-10-CM

## 2023-10-13 NOTE — PROGRESS NOTES
Outpatient Physical Therapy  [address] [facility], [place] [address]                            Physical Therapy Daily Treatment Note    Patient: Lj Crenshaw   : 1950  Diagnosis/ICD-10 Code:  Generalized weakness [R53.1]  Referring practitioner: Scott Hernandez III, MD  Date of Initial Visit: Type: THERAPY  Noted: 2023  Today's Date: 10/13/2023  Patient seen for 29 sessions           Subjective   Lj Crenshaw reports: no falls over the last few days. [place] feels like his allergies are a little worse today.     Objective   No LOB, decreased overall fatigue throughout PT session    See Exercise, Manual, and Modality Logs for complete treatment.     Assessment/Plan  Lj progressing as evident by decreased falls and increased tolerance of PT session. Pt tolerated exercises well, decreased overall fatigue throughout PT session Pt would benefit from skilled PT to address strength and endurance deficits, transfer and gait training and any concerns with ADLs.       Progress per Plan of Care         Timed:  Manual Therapy:         mins  56932;  Therapeutic Exercise:    15     mins  96726;     Neuromuscular Reyna:    15    mins  39670;    Therapeutic Activity:     15     mins  44725;     Gait Training:           mins  57333;    Aquatic Therapy:          mins  07713;       Untimed:  Electrical Stimulation:         mins  20610 ( );  Mechanical Traction:         mins  89945;       Timed Treatment:   45   mins   Total Treatment:     45   mins      Electronically signed:     Trudy Mckay PTA  Physical Therapist Assistant  [facility] License #: [id number]

## 2023-10-17 ENCOUNTER — TREATMENT (OUTPATIENT)
Dept: PHYSICAL THERAPY | Facility: CLINIC | Age: 73
End: 2023-10-17
Payer: MEDICARE

## 2023-10-17 DIAGNOSIS — R26.89 BALANCE PROBLEM: ICD-10-CM

## 2023-10-17 DIAGNOSIS — R26.9 GAIT DISTURBANCE: ICD-10-CM

## 2023-10-17 DIAGNOSIS — R53.1 GENERALIZED WEAKNESS: Primary | ICD-10-CM

## 2023-10-17 DIAGNOSIS — R27.8 COORDINATION IMPAIRMENT: ICD-10-CM

## 2023-10-17 NOTE — PROGRESS NOTES
Outpatient Physical Therapy  [address] [facility], [place] [address]                            Physical Therapy Daily Treatment Note    Patient: Lj Crenshaw   : 1950  Diagnosis/ICD-10 Code:  Generalized weakness [R53.1]  Referring practitioner: Scott Hernandez III, MD  Date of Initial Visit: Type: THERAPY  Noted: 2023  Today's Date: 10/17/2023  Patient seen for 30 sessions           Subjective   Lj Crenshaw reports: no falls over the last few weeks, states that he went to [facility] after last PT session and got a little dizzy walking around.     Objective   No LOB, unable to control when sitting.    See Exercise, Manual, and Modality Logs for complete treatment.     Assessment/Plan  Lj progressing as evident by decreased falls and increased tolerance of PT session. Pt required Stand by Assist for Safety throughout PT session, no LOB. Pt would benefit from skilled PT to address strength and endurance deficits, transfer and gait training and any concerns with ADLs.       Progress per Plan of Care         Timed:  Manual Therapy:         mins  43359;  Therapeutic Exercise:    15     mins  06110;     Neuromuscular Reyna:    15    mins  54291;    Therapeutic Activity:     15     mins  42276;     Gait Training:           mins  66693;    Aquatic Therapy:          mins  66894;       Untimed:  Electrical Stimulation:         mins  99455 ( );  Mechanical Traction:         mins  96916;       Timed Treatment:   45   mins   Total Treatment:     45   mins      Electronically signed:     Trudy Mckay PTA  Physical Therapist Assistant  [facility] License #: [id number]

## 2023-10-20 ENCOUNTER — TREATMENT (OUTPATIENT)
Dept: PHYSICAL THERAPY | Facility: CLINIC | Age: 73
End: 2023-10-20
Payer: MEDICARE

## 2023-10-20 DIAGNOSIS — R27.8 COORDINATION IMPAIRMENT: ICD-10-CM

## 2023-10-20 DIAGNOSIS — R26.9 GAIT DISTURBANCE: ICD-10-CM

## 2023-10-20 DIAGNOSIS — R53.1 GENERALIZED WEAKNESS: Primary | ICD-10-CM

## 2023-10-20 DIAGNOSIS — R26.89 BALANCE PROBLEM: ICD-10-CM

## 2023-10-20 NOTE — PROGRESS NOTES
Outpatient Physical Therapy  [address] [facility], [place] [address]                            Physical Therapy Daily Treatment Note    Patient: Lj Crenshaw   : 1950  Diagnosis/ICD-10 Code:  Generalized weakness [R53.1]  Referring practitioner: Scott Hernandez III, MD  Date of Initial Visit: Type: THERAPY  Noted: 2023  Today's Date: 10/20/2023  Patient seen for 31 sessions           Subjective   Lj Crenshaw reports: no falls over the last few days. States that he has an appt with [facility] on . They said they don't do medication, so he is going to the appt but if they are going to do the same therapy as here, he will just come back to [facility].     Objective   Fatigued quickly, shortened time on Bike    See Exercise, Manual, and Modality Logs for complete treatment.     Assessment/Plan  Lj progressing as evident by decreased falls and increased tolerance of PT session. Pt required no assistance throughout PT session, no LOB. Pt would benefit from skilled PT to address strength and endurance deficits, transfer and gait training and any concerns with ADLs.       Progress per Plan of Care         Timed:  Manual Therapy:        mins  70655;  Therapeutic Exercise:    15     mins  75117;     Neuromuscular Reyna:    10    mins  26488;    Therapeutic Activity:     15     mins  62256;     Gait Training:           mins  32839;          Timed Treatment:   40   mins   Total Treatment:     40   mins      Electronically signed:   Trudy Mckay PTA  Physical Therapist Assistant  [facility] License #: [id number]

## 2023-10-23 ENCOUNTER — TREATMENT (OUTPATIENT)
Dept: PHYSICAL THERAPY | Facility: CLINIC | Age: 73
End: 2023-10-23
Payer: MEDICARE

## 2023-10-23 DIAGNOSIS — R26.89 BALANCE PROBLEM: ICD-10-CM

## 2023-10-23 DIAGNOSIS — R27.8 COORDINATION IMPAIRMENT: ICD-10-CM

## 2023-10-23 DIAGNOSIS — R53.1 GENERALIZED WEAKNESS: Primary | ICD-10-CM

## 2023-10-23 DIAGNOSIS — R26.9 GAIT DISTURBANCE: ICD-10-CM

## 2023-10-23 PROCEDURE — 97530 THERAPEUTIC ACTIVITIES: CPT | Performed by: PHYSICAL THERAPIST

## 2023-10-23 PROCEDURE — 97112 NEUROMUSCULAR REEDUCATION: CPT | Performed by: PHYSICAL THERAPIST

## 2023-10-23 PROCEDURE — 97110 THERAPEUTIC EXERCISES: CPT | Performed by: PHYSICAL THERAPIST

## 2023-10-23 NOTE — PROGRESS NOTES
Progress Assessment  [address] [place] [id number]        Patient: Lj Crenshaw   : 1950  Diagnosis/ICD-10 Code:  Generalized weakness [R53.1]  Referring practitioner: Scott Hernandez III, MD  Date of Initial Visit: Type: THERAPY  Noted: 2023  Today's Date: 10/23/2023  Patient seen for 32 sessions      Subjective:   Lj Crenshaw reports: 0/10  Subjective Questionnaire: 30 second STS with UE support: 6 times at 22.5 inch table; TU seconds with rollator   Clinical Progress: improved  Home Program Compliance: Yes  Treatment has included: therapeutic exercise, neuromuscular re-education, manual therapy, therapeutic activity, and gait training    Subjective Pt reports he feels like his UE are fully strong now but reports he continues to have difficulty with lifting his legs and reports he still has to hold onto objects and/or rollator to maintain balance.     Objective     Strength/Myotome Testing     Left Shoulder      Planes of Motion   Flexion: 5  Abduction: 5  External rotation at 0°: 5  Internal rotation at 0°: 5     Isolated Muscles   Biceps: 5  Triceps: 5    Right Shoulder      Planes of Motion   Flexion: 5  Abduction: 5  External rotation at 0°: 5  Internal rotation at 0°: 5     Isolated Muscles   Biceps: 5  Triceps: 5     Left Hip   Planes of Motion   Flexion: 3-  Abduction: 3-  Adduction: 3-     Right Hip   Planes of Motion   Flexion: 3-  Abduction: 3-  Adduction: 3-     Left Knee   Flexion: 4  Extension: 4     Right Knee   Flexion: 4  Extension: 4     Left Ankle/Foot   Dorsiflexion: 0     Right Ankle/Foot   Dorsiflexion: 0      Feet shoulder width apart: 30 seconds  Feet together: 22 seconds     Pt ambulates with 4 wheeled walker with forward flexed posture and decrease in stride length and heel strike. Pt has increase in difficulty clearing L foot in swing phase and compensates with increasing L hip flexion. Pt wears AFO on R foot.     Assessment/Plan    Pt's STS and TUG  test stayed the same compared to last progress note and patient's B LE strength stayed relatively the same as well compared to last month. Discussed with patient about patient's progress and will be placing patient on HOLD until patient has follow-up with Edy next week. Will wait to see Edy's plan and will base continuing PT on this assessment. Pt agreed with plan.     Plan Goals:   1. Mobility: Walking/Moving Around Functional Limitation                               LTG 1: 12 weeks:  The patient will demonstrate TUG score <16 seconds for reduced fall risk.                           STATUS:  IN PROGRESS              STG 1a: 6 weeks:  The patient will demonstrate TUG score <18 seconds for reduced fall risk.                           STATUS:  IN PROGRESS    2. The patient has limited strength of the B shoulders and B hips.              LTG 2: 12 weeks:  The patient will demonstrate 4+ /5 strength for B shoulder flexion, abduction, external rotation, and internal rotation in order to demonstrate improved shoulder stability.                          STATUS:  MET              LTG 2a: 12 weeks:  The patient will demonstrate 4+/5 strength for B hip flexion, abduction, and extension in order to improve hip stability.                          STATUS:  IN PROGRESS    3. The patient has gait dysfunction.              LTG 3: 12 weeks:  The patient will demonstrate > 22 / 30 on the Functional Gait Assessment for improved functional mobility.                            STATUS:  IN PROGRESS              STG 3a: The patient will demonstrate > 18 / 30 on the Functional Gait Assessment for improved functional mobility.                            STATUS:  IN PROGRESS     4. The patient has difficulty with balance.               LTG 4: 12 weeks: The patient will hold the sharpened romberg position on open with eyes open for 20 seconds in order to improve balance and decrease risk of falling.                           STATUS:  IN PROGRESS              STG 4: 6 weeks: The patient will hold the romberg position on foam with eyes closed for 20 seconds in order to improve balance and decrease risk of falling.                           STATUS: IN PROGRESS      Progress toward previous goals: Partially Met    See Exercise, Manual, and Modality Logs for complete treatment.         Recommendations: Continue with recommendations on HOLD until appointment with Edy.   Timeframe: 2x a week for 1 month  Prognosis to achieve goals: good    PT SIGNATURE: Electronically signed by Tal Guerra, PT  [place] LICENSE: [id number]    Based upon review of the patient's progress and continued therapy plan, it is my medical opinion that Lj Crenshaw should continue physical therapy treatment at [facility] PHYSICAL THERAPY  [address]   [place] [address] [phone].      Timed:                 Manual Therapy:    0     mins  46859;     Therapeutic Exercise:    15     mins  26040;     Neuromuscular Reyna:    10    mins  25028;    Therapeutic Activity:     15     mins  17435;     Gait Trainin     mins  85532;     Ultrasound:     0     mins  13425;    Ionto                               0    mins   02042  Self pay                         0     mins [email or web address]    Un-Timed:  Electrical Stimulation:    0     mins  53620 ( )  Canalith Repos    0     mins 52136  Dry Needling     0     mins self-pay  Traction     0     mins 72810    Timed Treatment:   40   mins   Total Treatment:     40   mins      I certify that the therapy services are furnished while this patient is under my care.  The services outlined above are required by this patient, and will be reviewed every 90 days.

## 2023-11-08 ENCOUNTER — TELEPHONE (OUTPATIENT)
Dept: FAMILY MEDICINE CLINIC | Facility: CLINIC | Age: 73
End: 2023-11-08
Payer: COMMERCIAL

## 2023-11-08 NOTE — TELEPHONE ENCOUNTER
Patient called wanting to let you know that [facility] is going to do the same therapy as [facility] will do down here. So patient is going to continue therapy with [facility] since it is closer for him and he will go 2 times per week.   Patient would also like to look getting a hover round or motorized wheelchair.  Instructed would need to get the information to us.  Next appointment scheduled 12/18/2023

## 2023-11-09 ENCOUNTER — TREATMENT (OUTPATIENT)
Dept: PHYSICAL THERAPY | Facility: CLINIC | Age: 73
End: 2023-11-09
Payer: MEDICARE

## 2023-11-09 DIAGNOSIS — R27.8 COORDINATION IMPAIRMENT: ICD-10-CM

## 2023-11-09 DIAGNOSIS — R26.89 BALANCE PROBLEM: ICD-10-CM

## 2023-11-09 DIAGNOSIS — R26.9 GAIT DISTURBANCE: ICD-10-CM

## 2023-11-09 DIAGNOSIS — R53.1 GENERALIZED WEAKNESS: Primary | ICD-10-CM

## 2023-11-09 NOTE — PROGRESS NOTES
Outpatient Physical Therapy  [address] [facility], [place] [address]                            Physical Therapy Daily Treatment Note    Patient: Lj Crenshaw   : 1950  Diagnosis/ICD-10 Code:  Generalized weakness [R53.1]  Referring practitioner: Scott Hernandez III, MD  Date of Initial Visit: Type: THERAPY  Noted: 2023  Today's Date: 2023  Patient seen for 33 sessions           Subjective   Lj Crenshaw reports: that he went to [facility] and they were going to continue doing PT like he has already been doing. So they released him to continue doing PT at this facility. States that he hasn't done exercises since his last PT session almost 2 weeks ago.     Objective   No LOB throughout PT session.    See Exercise, Manual, and Modality Logs for complete treatment.     Assessment/Plan  Lj progressing as evident by decreased falls and increased tolerance of PT session. Pt required Stand by Assist for Safety throughout PT session, when transitioning sit to stand. Pt would benefit from skilled PT to address strength and endurance deficits, transfer and gait training and any concerns with ADLs.       Progress per Plan of Care         Timed:  Manual Therapy:        mins  69936;  Therapeutic Exercise:    15     mins  56483;     Neuromuscular Reyna:    15    mins  47638;    Therapeutic Activity:     15     mins  26088;     Gait Training:           mins  48454;          Timed Treatment:   45   mins   Total Treatment:     45   mins      Electronically signed:   Trudy Mckay PTA  Physical Therapist Assistant  [facility] License #: [id number]

## 2023-11-14 ENCOUNTER — TREATMENT (OUTPATIENT)
Dept: PHYSICAL THERAPY | Facility: CLINIC | Age: 73
End: 2023-11-14
Payer: MEDICARE

## 2023-11-14 DIAGNOSIS — R26.89 BALANCE PROBLEM: ICD-10-CM

## 2023-11-14 DIAGNOSIS — R26.9 GAIT DISTURBANCE: ICD-10-CM

## 2023-11-14 DIAGNOSIS — R27.8 COORDINATION IMPAIRMENT: ICD-10-CM

## 2023-11-14 DIAGNOSIS — R53.1 GENERALIZED WEAKNESS: Primary | ICD-10-CM

## 2023-11-14 NOTE — PROGRESS NOTES
Outpatient Physical Therapy  [address] [facility], [place] [address]                            Physical Therapy Daily Treatment Note    Patient: Lj Crenshaw   : 1950  Diagnosis/ICD-10 Code:  Generalized weakness [R53.1]  Referring practitioner: Scott Hernandez III, MD  Date of Initial Visit: Type: THERAPY  Noted: 2023  Today's Date: 2023  Patient seen for 34 sessions           Subjective   Lj Crenshaw reports: that he's feeling a little more tired where it is later in the day.     Objective   General fatigue following standing exercises.    See Exercise, Manual, and Modality Logs for complete treatment.     Assessment/Plan  Lj progressing as evident by decreased falls and increased tolerance of PT session. Pt required no assistance throughout PT session, no LOB. Pt would benefit from skilled PT to address strength and endurance deficits, transfer and gait training and any concerns with ADLs.       Progress per Plan of Care         Timed:  Manual Therapy:        mins  26906;  Therapeutic Exercise:    15     mins  06066;     Neuromuscular Reyna:    10    mins  49208;    Therapeutic Activity:     15     mins  72547;     Gait Training:           mins  07605;          Timed Treatment:   40   mins   Total Treatment:     40   mins      Electronically signed:   Trudy Mckay PTA  Physical Therapist Assistant  [facility] [employer] License #: [id number]   Pt awaiting social work.       Palmira Lane RN  01/09/20 6714

## 2023-11-16 ENCOUNTER — TREATMENT (OUTPATIENT)
Dept: PHYSICAL THERAPY | Facility: CLINIC | Age: 73
End: 2023-11-16
Payer: MEDICARE

## 2023-11-16 DIAGNOSIS — R27.8 COORDINATION IMPAIRMENT: ICD-10-CM

## 2023-11-16 DIAGNOSIS — R26.89 BALANCE PROBLEM: ICD-10-CM

## 2023-11-16 DIAGNOSIS — R26.9 GAIT DISTURBANCE: ICD-10-CM

## 2023-11-16 DIAGNOSIS — R53.1 GENERALIZED WEAKNESS: Primary | ICD-10-CM

## 2023-11-16 NOTE — PROGRESS NOTES
Outpatient Physical Therapy  [address] [facility], [place] [address]                            Physical Therapy Daily Treatment Note    Patient: Lj Crenshaw   : 1950  Diagnosis/ICD-10 Code:  Generalized weakness [R53.1]  Referring practitioner: Scott Hernandez III, MD  Date of Initial Visit: Type: THERAPY  Noted: 2023  Today's Date: 2023  Patient seen for 35 sessions           Subjective   Lj Crenshaw reports: that he was a little sore after last PT session. No falls. States that he lost his balance this morning in the kitchen but he was right by the counter, reports that he was bare foot and it felt like the right foot stuck.     Objective   General fatigue.    See Exercise, Manual, and Modality Logs for complete treatment.     Assessment/Plan  Lj progressing as evident by decreased falls and increased tolerance of PT session. Pt required no assistance throughout PT session, general fatigue. Pt would benefit from skilled PT to address strength and endurance deficits, transfer and gait training and any concerns with ADLs.       Progress per Plan of Care         Timed:  Manual Therapy:        mins  38186;  Therapeutic Exercise:    15     mins  85222;     Neuromuscular Reyna:    15    mins  75859;    Therapeutic Activity:     15     mins  68192;     Gait Training:           mins  78225;    Aquatic Therapy:          mins  86523;       Untimed:  Electrical Stimulation:         mins  59014 ( );  Mechanical Traction:         mins  21784;       Timed Treatment:   45   mins   Total Treatment:     45   mins      Electronically signed:     Trudy Mckay PTA  Physical Therapist Assistant  [facility] [employer] License #: [id number]

## 2023-11-28 ENCOUNTER — TREATMENT (OUTPATIENT)
Dept: PHYSICAL THERAPY | Facility: CLINIC | Age: 73
End: 2023-11-28
Payer: MEDICARE

## 2023-11-28 DIAGNOSIS — R26.9 GAIT DISTURBANCE: ICD-10-CM

## 2023-11-28 DIAGNOSIS — R27.8 COORDINATION IMPAIRMENT: ICD-10-CM

## 2023-11-28 DIAGNOSIS — R26.89 BALANCE PROBLEM: ICD-10-CM

## 2023-11-28 DIAGNOSIS — R53.1 GENERALIZED WEAKNESS: Primary | ICD-10-CM

## 2023-11-28 NOTE — PROGRESS NOTES
Progress Assessment  [address] [place] [address]        Patient: Lj Crenshaw   : 1950  Diagnosis/ICD-10 Code:  No primary diagnosis found.  Referring practitioner: Scott Hernandez III, MD  Date of Initial Visit: No linked episodes  Today's Date: 2023  Patient seen for Visit count could not be calculated. Make sure you are using a visit which is associated with an episode. sessions      Subjective:   Lj Crenshaw reports: 0/10  Subjective Questionnaire: 30 second STS with UE support: 6 times at 22.5 inch table; TU seconds with rollator    Clinical Progress: improved  Home Program Compliance: Yes  Treatment has included: therapeutic exercise, neuromuscular re-education, manual therapy, therapeutic activity, and gait training    Subjective Pt reports he feels like therapy is helping with gaining strength and maintaining strength. Pt reports he is still having difficulty getting out of lower chairs and going down inclines. Pt reports he has been working on his balance exercises at home.     Objective     Strength/Myotome Testing     Left Shoulder      Planes of Motion   Flexion: 5  Abduction: 5  External rotation at 0°: 5  Internal rotation at 0°: 5     Isolated Muscles   Biceps: 5  Triceps: 5    Right Shoulder      Planes of Motion   Flexion: 5  Abduction: 5  External rotation at 0°: 5  Internal rotation at 0°: 5     Isolated Muscles   Biceps: 5  Triceps: 5     Left Hip   Planes of Motion   Flexion: 3-  Abduction: 3-  Adduction: 3-     Right Hip   Planes of Motion   Flexion: 3-  Abduction: 3-  Adduction: 3-     Left Knee   Flexion: 4  Extension: 4     Right Knee   Flexion: 4  Extension: 4     Left Ankle/Foot   Dorsiflexion: 0     Right Ankle/Foot   Dorsiflexion: 0        Feet shoulder width apart: 30 seconds  Feet together EO: 30 seconds  Tandem stance: unable to perform without LOB     Pt ambulates with 4 wheeled walker with forward flexed posture and decrease in stride  length and heel strike. Pt has increase in difficulty clearing L foot in swing phase and compensates with increasing L hip flexion. Pt wears AFO on R foot.       Assessment/Plan    Pt did improve TUG score by 1 second and was able to improve his balance testing with feet together by 8 seconds today. Pt's strength maintained the same from last progress note but continues to have significant weakness in B LE's. Pt requires skilled PT in order to address balance and strength limitations in order to return patient back to maximum function. Will continue to progress patient as able. Discussed with patient to talk with his PCP at his next follow-up in middle of December about his functional mobility limitations.     Plan Goals:   1. Mobility: Walking/Moving Around Functional Limitation                               LTG 1: 12 weeks:  The patient will demonstrate TUG score <16 seconds for reduced fall risk.                           STATUS:  IN PROGRESS              STG 1a: 6 weeks:  The patient will demonstrate TUG score <18 seconds for reduced fall risk.                           STATUS:  IN PROGRESS    2. The patient has limited strength of the B shoulders and B hips.              LTG 2: 12 weeks:  The patient will demonstrate 4+ /5 strength for B shoulder flexion, abduction, external rotation, and internal rotation in order to demonstrate improved shoulder stability.                          STATUS:  MET              LTG 2a: 12 weeks:  The patient will demonstrate 4+/5 strength for B hip flexion, abduction, and extension in order to improve hip stability.                          STATUS:  IN PROGRESS    3. The patient has gait dysfunction.              LTG 3: 12 weeks:  The patient will demonstrate > 22 / 30 on the Functional Gait Assessment for improved functional mobility.                            STATUS:  IN PROGRESS              STG 3a: The patient will demonstrate > 18 / 30 on the Functional Gait Assessment for  improved functional mobility.                            STATUS:  IN PROGRESS     4. The patient has difficulty with balance.               LTG 4: 12 weeks: The patient will hold the sharpened romberg position on open with eyes open for 20 seconds in order to improve balance and decrease risk of falling.                           STATUS: IN PROGRESS              STG 4: 6 weeks: The patient will hold the romberg position for 20 seconds in order to improve balance and decrease risk of falling.                           STATUS: IN PROGRESS       Progress toward previous goals: Partially Met    See Exercise, Manual, and Modality Logs for complete treatment.         Recommendations: Continue as planned  Timeframe:1x a week for 6 weeks  Prognosis to achieve goals: good    PT SIGNATURE: Electronically signed by Tal Guerra PT  [place] LICENSE: [id number]    Based upon review of the patient's progress and continued therapy plan, it is my medical opinion that Lj Crenshaw should continue physical therapy treatment at [facility] PHYSICAL THERAPY  [address] [facility]  [place] [address] [phone].      Timed:                 Manual Therapy:    0     mins  31297;     Therapeutic Exercise:    15     mins  59239;     Neuromuscular Reyna:    8    mins  10872;    Therapeutic Activity:     20     mins  95260;     Gait Trainin     mins  18642;     Ultrasound:     0     mins  80651;    Ionto                               0    mins   60617  Self pay                         0     mins [email or web address]    Un-Timed:  Electrical Stimulation:    0     mins  53516 ( )  Canalith Repos    0     mins 59080  Dry Needling     0     mins self-pay  Traction     0     mins 76366    Timed Treatment:   43   mins   Total Treatment:     43   mins      I certify that the therapy services are furnished while this patient is under my care.  The services outlined above are required by this patient, and will be  reviewed every 90 days.

## 2023-12-05 ENCOUNTER — TREATMENT (OUTPATIENT)
Dept: PHYSICAL THERAPY | Facility: CLINIC | Age: 73
End: 2023-12-05
Payer: MEDICARE

## 2023-12-05 DIAGNOSIS — R27.8 COORDINATION IMPAIRMENT: ICD-10-CM

## 2023-12-05 DIAGNOSIS — R26.89 BALANCE PROBLEM: ICD-10-CM

## 2023-12-05 DIAGNOSIS — R53.1 GENERALIZED WEAKNESS: Primary | ICD-10-CM

## 2023-12-05 DIAGNOSIS — R26.9 GAIT DISTURBANCE: ICD-10-CM

## 2023-12-05 NOTE — PROGRESS NOTES
Outpatient Physical Therapy  [address] [facility], [place] [address]                            Physical Therapy Daily Treatment Note    Patient: Lj Crenshaw   : 1950  Diagnosis/ICD-10 Code:  Generalized weakness [R53.1]  Referring practitioner: Andrzej Cardona, *  Date of Initial Visit: Type: THERAPY  Noted: 2023  Today's Date: 2023  Patient seen for 37 sessions           Subjective   Lj Crenshaw reports: that he isn't having a great day, just not as much energy. His wife thinks he might have Pneumonia because he's been coughing more and more.     Objective   General fatigue    See Exercise, Manual, and Modality Logs for complete treatment.     Assessment/Plan  Lj progressing as evident by increased tolerance of PT session. Pt required  rest breaks  throughout PT session. Pt would benefit from skilled PT to address strength and endurance deficits, transfer and gait training and any concerns with ADLs.       Progress per Plan of Care         Timed:  Manual Therapy:        mins  77955;  Therapeutic Exercise:    15     mins  31788;     Neuromuscular Reyna:    10    mins  46605;    Therapeutic Activity:     15     mins  72445;     Gait Training:           mins  27169;          Timed Treatment:   40   mins   Total Treatment:     40   mins      Electronically signed:   Trudy Mckay PTA  Physical Therapist Assistant  [facility] License #: [id number]

## 2023-12-11 ENCOUNTER — CLINICAL SUPPORT (OUTPATIENT)
Dept: FAMILY MEDICINE CLINIC | Facility: CLINIC | Age: 73
End: 2023-12-11
Payer: MEDICARE

## 2023-12-11 DIAGNOSIS — E78.2 MIXED HYPERLIPIDEMIA: ICD-10-CM

## 2023-12-11 DIAGNOSIS — F17.200 SMOKER: ICD-10-CM

## 2023-12-11 DIAGNOSIS — Z76.89 ESTABLISHING CARE WITH NEW DOCTOR, ENCOUNTER FOR: ICD-10-CM

## 2023-12-11 LAB
ALBUMIN SERPL-MCNC: 4.1 G/DL (ref 3.5–5.2)
ALBUMIN/GLOB SERPL: 1.5 G/DL
ALP SERPL-CCNC: 73 U/L (ref 39–117)
ALT SERPL W P-5'-P-CCNC: 21 U/L (ref 1–41)
ANION GAP SERPL CALCULATED.3IONS-SCNC: 11.8 MMOL/L (ref 5–15)
AST SERPL-CCNC: 20 U/L (ref 1–40)
BASOPHILS # BLD AUTO: 0.05 10*3/MM3 (ref 0–0.2)
BASOPHILS NFR BLD AUTO: 0.7 % (ref 0–1.5)
BILIRUB SERPL-MCNC: 0.6 MG/DL (ref 0–1.2)
BUN SERPL-MCNC: 16 MG/DL (ref 8–23)
BUN/CREAT SERPL: 22.9 (ref 7–25)
CALCIUM SPEC-SCNC: 9.7 MG/DL (ref 8.6–10.5)
CHLORIDE SERPL-SCNC: 102 MMOL/L (ref 98–107)
CHOLEST SERPL-MCNC: 148 MG/DL (ref 0–200)
CO2 SERPL-SCNC: 24.2 MMOL/L (ref 22–29)
CREAT SERPL-MCNC: 0.7 MG/DL (ref 0.76–1.27)
DEPRECATED RDW RBC AUTO: 42.6 FL (ref 37–54)
EGFRCR SERPLBLD CKD-EPI 2021: 98.5 ML/MIN/1.73
EOSINOPHIL # BLD AUTO: 0.16 10*3/MM3 (ref 0–0.4)
EOSINOPHIL NFR BLD AUTO: 2.3 % (ref 0.3–6.2)
ERYTHROCYTE [DISTWIDTH] IN BLOOD BY AUTOMATED COUNT: 13.4 % (ref 12.3–15.4)
GLOBULIN UR ELPH-MCNC: 2.8 GM/DL
GLUCOSE SERPL-MCNC: 107 MG/DL (ref 65–99)
HCT VFR BLD AUTO: 45 % (ref 37.5–51)
HDLC SERPL-MCNC: 51 MG/DL (ref 40–60)
HGB BLD-MCNC: 15 G/DL (ref 13–17.7)
IMM GRANULOCYTES # BLD AUTO: 0.02 10*3/MM3 (ref 0–0.05)
IMM GRANULOCYTES NFR BLD AUTO: 0.3 % (ref 0–0.5)
LDLC SERPL CALC-MCNC: 81 MG/DL (ref 0–100)
LDLC/HDLC SERPL: 1.57 {RATIO}
LYMPHOCYTES # BLD AUTO: 1.39 10*3/MM3 (ref 0.7–3.1)
LYMPHOCYTES NFR BLD AUTO: 19.6 % (ref 19.6–45.3)
MCH RBC QN AUTO: 29 PG (ref 26.6–33)
MCHC RBC AUTO-ENTMCNC: 33.3 G/DL (ref 31.5–35.7)
MCV RBC AUTO: 87 FL (ref 79–97)
MONOCYTES # BLD AUTO: 0.61 10*3/MM3 (ref 0.1–0.9)
MONOCYTES NFR BLD AUTO: 8.6 % (ref 5–12)
NEUTROPHILS NFR BLD AUTO: 4.87 10*3/MM3 (ref 1.7–7)
NEUTROPHILS NFR BLD AUTO: 68.5 % (ref 42.7–76)
NRBC BLD AUTO-RTO: 0 /100 WBC (ref 0–0.2)
PLATELET # BLD AUTO: 207 10*3/MM3 (ref 140–450)
PMV BLD AUTO: 9.7 FL (ref 6–12)
POTASSIUM SERPL-SCNC: 4.6 MMOL/L (ref 3.5–5.2)
PROT SERPL-MCNC: 6.9 G/DL (ref 6–8.5)
RBC # BLD AUTO: 5.17 10*6/MM3 (ref 4.14–5.8)
SODIUM SERPL-SCNC: 138 MMOL/L (ref 136–145)
TRIGL SERPL-MCNC: 84 MG/DL (ref 0–150)
VLDLC SERPL-MCNC: 16 MG/DL (ref 5–40)
WBC NRBC COR # BLD AUTO: 7.1 10*3/MM3 (ref 3.4–10.8)

## 2023-12-11 PROCEDURE — 80053 COMPREHEN METABOLIC PANEL: CPT | Performed by: STUDENT IN AN ORGANIZED HEALTH CARE EDUCATION/TRAINING PROGRAM

## 2023-12-11 PROCEDURE — 85025 COMPLETE CBC W/AUTO DIFF WBC: CPT | Performed by: STUDENT IN AN ORGANIZED HEALTH CARE EDUCATION/TRAINING PROGRAM

## 2023-12-11 PROCEDURE — 80061 LIPID PANEL: CPT | Performed by: STUDENT IN AN ORGANIZED HEALTH CARE EDUCATION/TRAINING PROGRAM

## 2023-12-12 ENCOUNTER — TREATMENT (OUTPATIENT)
Dept: PHYSICAL THERAPY | Facility: CLINIC | Age: 73
End: 2023-12-12
Payer: MEDICARE

## 2023-12-12 DIAGNOSIS — R53.1 GENERALIZED WEAKNESS: Primary | ICD-10-CM

## 2023-12-12 DIAGNOSIS — R26.9 GAIT DISTURBANCE: ICD-10-CM

## 2023-12-12 DIAGNOSIS — R26.89 BALANCE PROBLEM: ICD-10-CM

## 2023-12-12 DIAGNOSIS — R27.8 COORDINATION IMPAIRMENT: ICD-10-CM

## 2023-12-12 NOTE — PROGRESS NOTES
Outpatient Physical Therapy  [address] [facility], [place] [address]                            Physical Therapy Daily Treatment Note    Patient: Lj Crenshaw   : 1950  Diagnosis/ICD-10 Code:  Generalized weakness [R53.1]  Referring practitioner: Scott Hernandez III, MD  Date of Initial Visit: Type: THERAPY  Noted: 2023  Today's Date: 2023  Patient seen for 38 sessions           Subjective   Lj Crenshaw reports: that he still feels a little weak from being sick over the last few weeks. States that he has blood work then see MD on Monday.    Objective   Difficulty with tall kneeling position due to pain in quads.    See Exercise, Manual, and Modality Logs for complete treatment.     Assessment/Plan  Lj progressing as evident by increased tolerance of PT session. Pt required  Mod A  transitioning from tall kneeling to sitting on mat table Pt would benefit from skilled PT to address strength and endurance deficits, transfer and gait training and any concerns with ADLs.        Progress per Plan of Care         Timed:  Manual Therapy:        mins  57135;  Therapeutic Exercise:    15     mins  05125;     Neuromuscular Reyna:    10    mins  67870;    Therapeutic Activity:     15     mins  87832;     Gait Training:           mins  77559;          Timed Treatment:   40   mins   Total Treatment:     40   mins      Electronically signed:   Trudy Mckay PTA  Physical Therapist Assistant  [facility] License #: [id number]

## 2023-12-18 ENCOUNTER — HOSPITAL ENCOUNTER (OUTPATIENT)
Dept: GENERAL RADIOLOGY | Facility: HOSPITAL | Age: 73
Discharge: HOME | End: 2023-12-18
Admitting: STUDENT IN AN ORGANIZED HEALTH CARE EDUCATION/TRAINING PROGRAM
Payer: MEDICARE

## 2023-12-18 ENCOUNTER — OFFICE VISIT (OUTPATIENT)
Dept: FAMILY MEDICINE CLINIC | Facility: CLINIC | Age: 73
End: 2023-12-18
Payer: MEDICARE

## 2023-12-18 VITALS
OXYGEN SATURATION: 96 % | TEMPERATURE: 98.3 F | SYSTOLIC BLOOD PRESSURE: 120 MMHG | HEART RATE: 94 BPM | DIASTOLIC BLOOD PRESSURE: 62 MMHG | HEIGHT: 78 IN | WEIGHT: 274 LBS | BODY MASS INDEX: 31.7 KG/M2

## 2023-12-18 DIAGNOSIS — I77.9 DISORDER OF AORTA: ICD-10-CM

## 2023-12-18 DIAGNOSIS — E78.2 MIXED HYPERLIPIDEMIA: ICD-10-CM

## 2023-12-18 DIAGNOSIS — M21.371 FOOT DROP, RIGHT: ICD-10-CM

## 2023-12-18 DIAGNOSIS — R05.9 COUGH, UNSPECIFIED TYPE: Primary | ICD-10-CM

## 2023-12-18 DIAGNOSIS — R26.89 BALANCE DISORDER: ICD-10-CM

## 2023-12-18 DIAGNOSIS — R05.9 COUGH, UNSPECIFIED TYPE: ICD-10-CM

## 2023-12-18 DIAGNOSIS — N52.9 ERECTILE DYSFUNCTION, UNSPECIFIED ERECTILE DYSFUNCTION TYPE: ICD-10-CM

## 2023-12-18 DIAGNOSIS — I71.23 ANEURYSM OF THE DESCENDING THORACIC AORTA, WITHOUT RUPTURE: ICD-10-CM

## 2023-12-18 DIAGNOSIS — I71.9 AORTIC ANEURYSM WITHOUT RUPTURE, UNSPECIFIED PORTION OF AORTA: ICD-10-CM

## 2023-12-18 DIAGNOSIS — I10 ESSENTIAL HYPERTENSION: ICD-10-CM

## 2023-12-18 PROCEDURE — 71046 X-RAY EXAM CHEST 2 VIEWS: CPT

## 2023-12-18 RX ORDER — TADALAFIL 20 MG/1
20 TABLET ORAL DAILY PRN
Qty: 90 TABLET | Refills: 1 | Status: SHIPPED | OUTPATIENT
Start: 2023-12-18 | End: 2023-12-18 | Stop reason: SDUPTHER

## 2023-12-18 RX ORDER — TADALAFIL 20 MG/1
20 TABLET ORAL DAILY PRN
Qty: 90 TABLET | Refills: 1 | Status: SHIPPED | OUTPATIENT
Start: 2023-12-18 | End: 2023-12-21

## 2023-12-18 RX ORDER — LISINOPRIL AND HYDROCHLOROTHIAZIDE 12.5; 1 MG/1; MG/1
1 TABLET ORAL DAILY
Qty: 90 TABLET | Refills: 3 | Status: SHIPPED | OUTPATIENT
Start: 2023-12-18

## 2023-12-18 RX ORDER — ROSUVASTATIN CALCIUM 40 MG/1
40 TABLET, COATED ORAL DAILY
Qty: 90 TABLET | Refills: 3 | Status: SHIPPED | OUTPATIENT
Start: 2023-12-18

## 2023-12-18 NOTE — PROGRESS NOTES
"Chief Complaint  3 month follow up (States slowing down some but doing well. ) and Cough (Dry cough X 3-4 weeks)    Subjective      Cough      Lj Crenshaw is a 73 y.o. male who presents to [facility] with a past medical history of HTN, HLD, foot drop, balance disorder.     He comes today with subacute cough. He refers starting 4 weeks ago and now getting better. Patient has history of sarcoidosis. Will obtain XR now.   Likely diagnoses postviral cough r/o any other conditions.      HM  - Colonoscopy for next visit   - pneumo next visit   -AAA follow up     Objective   Vital Signs:   Vitals:    12/18/23 1004   BP: 120/62   Pulse: 94   Temp: 98.3 °F (36.8 °C)   SpO2: 96%   Weight: 124 kg (274 lb)   Height: 198.1 cm (78\")     Body mass index is 31.66 kg/m².    Wt Readings from Last 3 Encounters:   12/18/23 124 kg (274 lb)   09/18/23 124 kg (274 lb)   04/20/23 125 kg (276 lb)     BP Readings from Last 3 Encounters:   12/18/23 120/62   09/18/23 117/75   04/20/23 108/64       Health Maintenance   Topic Date Due    Pneumococcal Vaccine 65+ (2 - PCV) 11/19/2019    COVID-19 Vaccine (3 - 2023-24 season) 09/01/2023    COLORECTAL CANCER SCREENING  10/28/2023    INFLUENZA VACCINE  03/31/2024 (Originally 8/1/2023)    ANNUAL WELLNESS VISIT  02/01/2024    BMI FOLLOWUP  06/12/2024    LIPID PANEL  12/11/2024    TDAP/TD VACCINES (2 - Td or Tdap) 09/18/2033    HEPATITIS C SCREENING  Completed    AAA SCREEN (ONE-TIME)  Completed    ZOSTER VACCINE  Completed       Physical Exam  Vitals reviewed.   HENT:      Head: Normocephalic.      Mouth/Throat:      Mouth: Mucous membranes are moist.   Eyes:      Pupils: Pupils are equal, round, and reactive to light.   Cardiovascular:      Rate and Rhythm: Normal rate.   Abdominal:      General: Abdomen is flat.   Musculoskeletal:         General: Normal range of motion.      Cervical back: Normal range of motion.   Skin:     General: Skin is warm.      " Capillary Refill: Capillary refill takes less than 2 seconds.   Neurological:      Mental Status: He is alert.            Result Review :  The following data was reviewed by: Andrzej Ledesma MD on 12/18/2023:             Assessment and Plan   Diagnoses and all orders for this visit:    1. Cough, unspecified type (Primary)  -     XR Chest 2 View; Future    2. Aortic aneurysm without rupture, unspecified portion of aorta  -     US Abdominal Vascular Complete; Future    3. Aneurysm of the descending thoracic aorta, without rupture  -     US Abdominal Vascular Complete; Future    4. Essential hypertension  -     lisinopril-hydrochlorothiazide (PRINZIDE,ZESTORETIC) 10-12.5 MG per tablet; Take 1 tablet by mouth Daily.  Dispense: 90 tablet; Refill: 3    5. Mixed hyperlipidemia  -     rosuvastatin (Crestor) 40 MG tablet; Take 1 tablet by mouth Daily. generic  Dispense: 90 tablet; Refill: 3    6. Disorder of aorta    7. Balance disorder    8. Foot drop, right    9. Erectile dysfunction, unspecified erectile dysfunction type  -     Discontinue: tadalafil (CIALIS) 20 MG tablet; Take 1 tablet by mouth Daily As Needed for Erectile Dysfunction.  Dispense: 90 tablet; Refill: 1  -     tadalafil (CIALIS) 20 MG tablet; Take 1 tablet by mouth Daily As Needed for Erectile Dysfunction.  Dispense: 90 tablet; Refill: 1    Other orders  -     Cyanocobalamin 1000 MCG sublingual tablet; Place 1 tablet under the tongue Daily.  Dispense: 90 each; Refill: 1                  FOLLOW UP  Return in about 6 years (around 12/18/2029).  Patient was given instructions and counseling regarding his condition or for health maintenance advice. Please see specific information pulled into the AVS if appropriate.       Andrzej Ledesma MD  12/18/23  10:47 EST    CURRENT & DISCONTINUED MEDICATIONS  Current Outpatient Medications   Medication Instructions    Cyanocobalamin 1000 MCG sublingual tablet 1 tablet, Sublingual, Daily     ketorolac (ACULAR) 0.5 % ophthalmic solution No dose, route, or frequency recorded.    lisinopril-hydrochlorothiazide (PRINZIDE,ZESTORETIC) 10-12.5 MG per tablet 1 tablet, Oral, Daily    prednisoLONE acetate (PRED FORTE) 1 % ophthalmic suspension 1 drop, 4 Times Daily    rosuvastatin (CRESTOR) 40 mg, Oral, Daily, generic    tadalafil (CIALIS) 20 mg, Oral, Daily PRN       Medications Discontinued During This Encounter   Medication Reason    Cyanocobalamin 1000 MCG sublingual tablet Reorder    tadalafil (CIALIS) 20 MG tablet Reorder    rosuvastatin (Crestor) 40 MG tablet Reorder    lisinopril-hydrochlorothiazide (PRINZIDE,ZESTORETIC) 10-12.5 MG per tablet Reorder    tadalafil (CIALIS) 20 MG tablet Reorder

## 2023-12-19 ENCOUNTER — TREATMENT (OUTPATIENT)
Dept: PHYSICAL THERAPY | Facility: CLINIC | Age: 73
End: 2023-12-19
Payer: MEDICARE

## 2023-12-19 DIAGNOSIS — R27.8 COORDINATION IMPAIRMENT: ICD-10-CM

## 2023-12-19 DIAGNOSIS — R26.9 GAIT DISTURBANCE: ICD-10-CM

## 2023-12-19 DIAGNOSIS — R53.1 GENERALIZED WEAKNESS: Primary | ICD-10-CM

## 2023-12-19 DIAGNOSIS — R26.89 BALANCE PROBLEM: ICD-10-CM

## 2023-12-19 NOTE — PROGRESS NOTES
Re-Assessment / Re-Certification  [address] [place] [id number]      Patient: Lj Crenshaw   : 1950  Diagnosis/ICD-10 Code:  Generalized weakness [R53.1]  Referring practitioner: Scott Hernandez III, MD  Date of Initial Visit: Type: THERAPY  Noted: 2023  Today's Date: 2023  Patient seen for 39 sessions      Subjective:   Lj Crenshaw reports: 0/10  Subjective Questionnaire: 30 second STS with UE support: 6.5 times at 22.5 inch table; TU.5 seconds with rollator    Clinical Progress: unchanged  Home Program Compliance: Yes  Treatment has included: therapeutic exercise, neuromuscular re-education, manual therapy, therapeutic activity, and gait training    Subjective Pt reports no new complaints today. Pt reports no significant changes over the last month.     Objective     Strength/Myotome Testing     Left Shoulder      Planes of Motion   Flexion: 5  Abduction: 5  External rotation at 0°: 5  Internal rotation at 0°: 5     Isolated Muscles   Biceps: 5  Triceps: 5    Right Shoulder      Planes of Motion   Flexion: 5  Abduction: 5  External rotation at 0°: 5  Internal rotation at 0°: 5     Isolated Muscles   Biceps: 5  Triceps: 5     Left Hip   Planes of Motion   Flexion: 3-  Abduction: 3-  Adduction: 3-     Right Hip   Planes of Motion   Flexion: 3-  Abduction: 3-  Adduction: 3-     Left Knee   Flexion: 4  Extension: 4     Right Knee   Flexion: 4  Extension: 4     Left Ankle/Foot   Dorsiflexion: 0     Right Ankle/Foot   Dorsiflexion: 0        Feet shoulder width apart: 30 seconds  Feet together EO: 30 seconds  Tandem stance:R foot in front: wide staggered stance: 24 seconds and L foot in front: wife staggered stance: 6 seconds     FGA: 9    Assessment/Plan    Pt did have a decrease in TUG score by 1.5 second, but was able to improve his balance testing and was able to perform tandem stance with slight increase in BANDAR today for the first time with testing. Pt's UE strength  maintained the same from last progress note and patient continues to have significant weakness in B LE's. Was able to perform FGA for the first time today due to improvement in balance. Pt requires skilled PT in order to address balance and strength limitations in order to return patient back to maximum function. Will continue to progress patient as able to improve balance, strength, and endurance.    Plan Goals:   1. Mobility: Walking/Moving Around Functional Limitation                               LTG 1: 12 weeks:  The patient will demonstrate TUG score <16 seconds for reduced fall risk.                           STATUS:  IN PROGRESS              STG 1a: 6 weeks:  The patient will demonstrate TUG score <18 seconds for reduced fall risk.                           STATUS:  IN PROGRESS    2. The patient has limited strength of the B shoulders and B hips.              LTG 2: 12 weeks:  The patient will demonstrate 4+ /5 strength for B shoulder flexion, abduction, external rotation, and internal rotation in order to demonstrate improved shoulder stability.                          STATUS:  MET              LTG 2a: 12 weeks:  The patient will demonstrate 4+/5 strength for B hip flexion, abduction, and extension in order to improve hip stability.                          STATUS:  IN PROGRESS    3. The patient has gait dysfunction.              LTG 3: 12 weeks:  The patient will demonstrate > 22 / 30 on the Functional Gait Assessment for improved functional mobility.                            STATUS:  IN PROGRESS              STG 3a: The patient will demonstrate > 18 / 30 on the Functional Gait Assessment for improved functional mobility.                            STATUS:  IN PROGRESS     4. The patient has difficulty with balance.               LTG 4: 12 weeks: The patient will hold the sharpened romberg position on open with eyes open for 20 seconds in order to improve balance and decrease risk of falling.                            STATUS: IN PROGRESS              STG 4: 6 weeks: The patient will hold the romberg position for 20 seconds in order to improve balance and decrease risk of falling.                           STATUS: IN PROGRESS      Progress toward previous goals: Partially Met    See Exercise, Manual, and Modality Logs for complete treatment.         Recommendations: Continue as planned  Timeframe: 1x a week for 1 month  Prognosis to achieve goals: good    PT Signature: Electronically signed by MARIXA [facility] [place] [facility] LICENSE: [id number]      Based upon review of the patient's progress and continued therapy plan, it is my medical opinion that Lj Crenshaw should continue physical therapy treatment at [facility]  [address] [facility]  [place] [address] [phone].    Signature: __________________________________  Scott Hernandez III, MD    Timed:           Timed:         Manual Therapy:    0     mins  62582;     Therapeutic Exercise:    15     mins  18677;     Neuromuscular Reyna:    8    mins  42806;    Therapeutic Activity:     15    mins  72388;     Gait Trainin     mins  01921;     Ultrasound:     0     mins  79212;    Ionto                               0    mins   30139  Self pay                         0     mins [email or web address]    Un-Timed:  Electrical Stimulation:    0     mins  45001 ( )  Canalith Repos    0     mins 04646  Dry Needling     0     mins self-pay  Traction     0     mins 01709  Re-Eval                           0    mins  52810    Timed Treatment:   38   mins   Total Treatment:     38   mins    PT SIGNATURE: Electronically signed by MARIXA [facility] [place] [facility] LICENSE: [id number]     DATE TREATMENT INITIATED: 2023    90 Day Recertification  Certification Period: 2023 thru 3/17/2024  I certify that the therapy services are furnished while this patient is under my care.  The services outlined above are required by this patient, and  will be reviewed every 90 days.     PHYSICIAN: Scott Hernandez III, MD   NPI: [id number]      DATE:     Please sign and return via fax to [phone] Thank you, [facility].

## 2023-12-27 ENCOUNTER — TREATMENT (OUTPATIENT)
Dept: PHYSICAL THERAPY | Facility: CLINIC | Age: 73
End: 2023-12-27
Payer: MEDICARE

## 2023-12-27 DIAGNOSIS — R26.9 GAIT DISTURBANCE: ICD-10-CM

## 2023-12-27 DIAGNOSIS — R27.8 COORDINATION IMPAIRMENT: ICD-10-CM

## 2023-12-27 DIAGNOSIS — R53.1 GENERALIZED WEAKNESS: Primary | ICD-10-CM

## 2023-12-27 DIAGNOSIS — R26.89 BALANCE PROBLEM: ICD-10-CM

## 2023-12-27 PROCEDURE — 97110 THERAPEUTIC EXERCISES: CPT | Performed by: PHYSICAL THERAPIST

## 2023-12-27 PROCEDURE — 97530 THERAPEUTIC ACTIVITIES: CPT | Performed by: PHYSICAL THERAPIST

## 2023-12-27 PROCEDURE — 97112 NEUROMUSCULAR REEDUCATION: CPT | Performed by: PHYSICAL THERAPIST

## 2023-12-27 NOTE — PROGRESS NOTES
Outpatient Physical Therapy  [address] [facility], [place] [address]                            Physical Therapy Daily Treatment Note    Patient: Lj Crenshaw   : 1950  Diagnosis/ICD-10 Code:  Generalized weakness [R53.1]  Referring practitioner: Scott Hernandez III, MD  Date of Initial Visit: Type: THERAPY  Noted: 2023  Today's Date: 2023  Patient seen for 40 sessions           Subjective   Lj Crenshaw reports: that he saw his MD last week and said that his blood work looked really good.     Objective   General fatigue    See Exercise, Manual, and Modality Logs for complete treatment.     Assessment/Plan  Lj progressing as evident by decreased falls and increased tolerance of PT session. Pt required no assistance throughout PT session, . Pt would benefit from skilled PT to address strength and endurance deficits, transfer and gait training and any concerns with ADLs.       Progress per Plan of Care         Timed:  Manual Therapy:        mins  47841;  Therapeutic Exercise:    15     mins  14885;     Neuromuscular Reyna:    10    mins  54318;    Therapeutic Activity:     15     mins  36032;     Gait Training:           mins  81862;          Timed Treatment:   40   mins   Total Treatment:     40   mins      Electronically signed:   Trudy Mckay PTA  Physical Therapist Assistant  [facility] License #: [id number]

## 2024-01-04 ENCOUNTER — TREATMENT (OUTPATIENT)
Dept: PHYSICAL THERAPY | Facility: CLINIC | Age: 74
End: 2024-01-04
Payer: MEDICARE

## 2024-01-04 DIAGNOSIS — R53.1 GENERALIZED WEAKNESS: Primary | ICD-10-CM

## 2024-01-04 DIAGNOSIS — R27.8 COORDINATION IMPAIRMENT: ICD-10-CM

## 2024-01-04 DIAGNOSIS — R26.89 BALANCE PROBLEM: ICD-10-CM

## 2024-01-04 DIAGNOSIS — R26.9 GAIT DISTURBANCE: ICD-10-CM

## 2024-01-04 PROCEDURE — 97530 THERAPEUTIC ACTIVITIES: CPT | Performed by: PHYSICAL THERAPIST

## 2024-01-04 PROCEDURE — 97110 THERAPEUTIC EXERCISES: CPT | Performed by: PHYSICAL THERAPIST

## 2024-01-04 PROCEDURE — 97112 NEUROMUSCULAR REEDUCATION: CPT | Performed by: PHYSICAL THERAPIST

## 2024-01-04 NOTE — PROGRESS NOTES
Outpatient Physical Therapy  [address] [facility], [place] [address]                            Physical Therapy Daily Treatment Note    Patient: Lj Crenshaw   : 1950  Diagnosis/ICD-10 Code:  Generalized weakness [R53.1]  Referring practitioner: Scott Hernandez III, MD  Date of Initial Visit: Type: THERAPY  Noted: 2023  Today's Date: 2024  Patient seen for 41 sessions           Subjective   Lj Crenshaw reports: that he usually walks around barefoot at home, states that he just has to concentrate more on picking up his right foot.     Objective   General fatigue with standing exercises.    See Exercise, Manual, and Modality Logs for complete treatment.     Assessment/Plan  Lj progressing as evident by decreased falls and increased tolerance of PT session, including balance activities. Pt required Stand by Assist for Safety throughout PT session, no LOB. Pt would benefit from skilled PT to address strength and endurance deficits, transfer and gait training and any concerns with ADLs.       Progress per Plan of Care         Timed:  Manual Therapy:         mins  20033;  Therapeutic Exercise:    15     mins  46835;     Neuromuscular Reyna:    10    mins  52067;    Therapeutic Activity:     15     mins  92826;     Gait Training:           mins  64148;        Untimed:  Electrical Stimulation:         mins  73257 ( );  Mechanical Traction:         mins  03264;       Timed Treatment:   40   mins   Total Treatment:     40   mins      Electronically signed:     Trudy Mckay PTA  Physical Therapist Assistant  [place] [facility] License #: [id number]

## 2024-01-10 ENCOUNTER — TREATMENT (OUTPATIENT)
Dept: PHYSICAL THERAPY | Facility: CLINIC | Age: 74
End: 2024-01-10
Payer: MEDICARE

## 2024-01-10 DIAGNOSIS — R26.89 BALANCE PROBLEM: ICD-10-CM

## 2024-01-10 DIAGNOSIS — R53.1 GENERALIZED WEAKNESS: Primary | ICD-10-CM

## 2024-01-10 DIAGNOSIS — R27.8 COORDINATION IMPAIRMENT: ICD-10-CM

## 2024-01-10 DIAGNOSIS — R26.9 GAIT DISTURBANCE: ICD-10-CM

## 2024-01-10 NOTE — PROGRESS NOTES
Outpatient Physical Therapy  [address] [facility], [place] [address]                            Physical Therapy Daily Treatment Note    Patient: Lj Crenshaw   : 1950  Diagnosis/ICD-10 Code:  Generalized weakness [R53.1]  Referring practitioner: Scott Hernandez III, MD  Date of Initial Visit: Type: THERAPY  Noted: 2023  Today's Date: 1/10/2024  Patient seen for 42 sessions           Subjective   Lj Crenshaw reports: that overall he has been feeling pretty good, no falls.     Objective   General fatigue, required 1 UE support for standing card sorting.    See Exercise, Manual, and Modality Logs for complete treatment.     Assessment/Plan  Lj progressing as evident by increased tolerance of PT session, including strength and endurance. Pt required Stand by Assist for Safety throughout PT session. Pt would benefit from skilled PT to address strength and endurance deficits, transfer and gait training and any concerns with ADLs.       Progress per Plan of Care         Timed:  Manual Therapy:        mins  17715;  Therapeutic Exercise:    15     mins  16358;     Neuromuscular Reyna:    15    mins  71485;    Therapeutic Activity:     15     mins  97468;     Gait Training:           mins  33885;          Timed Treatment:   45   mins   Total Treatment:     45   mins      Electronically signed:   Trudy Mckay PTA  Physical Therapist Assistant  [facility] License #: [id number]

## 2024-01-17 ENCOUNTER — TELEPHONE (OUTPATIENT)
Dept: PHYSICAL THERAPY | Facility: CLINIC | Age: 74
End: 2024-01-17

## 2024-01-17 NOTE — TELEPHONE ENCOUNTER
Caller: Alice Arana    Relationship: Emergency Contact         What was the call regarding: HAS A BAD COUGH

## 2024-01-30 ENCOUNTER — TREATMENT (OUTPATIENT)
Dept: PHYSICAL THERAPY | Facility: CLINIC | Age: 74
End: 2024-01-30
Payer: MEDICARE

## 2024-01-30 DIAGNOSIS — R53.1 GENERALIZED WEAKNESS: Primary | ICD-10-CM

## 2024-01-30 DIAGNOSIS — R26.89 BALANCE PROBLEM: ICD-10-CM

## 2024-01-30 DIAGNOSIS — R26.9 GAIT DISTURBANCE: ICD-10-CM

## 2024-01-30 DIAGNOSIS — R27.8 COORDINATION IMPAIRMENT: ICD-10-CM

## 2024-01-30 NOTE — PROGRESS NOTES
Progress Assessment  [address] [place] [address]        Patient: Lj Crenshaw   : 1950  Diagnosis/ICD-10 Code:  Generalized weakness [R53.1]  Referring practitioner: Scott Hernandez III, MD  Date of Initial Visit: Type: THERAPY  Noted: 2023  Today's Date: 2024  Patient seen for 43 sessions      Subjective:   Lj Crenshaw reports: 0/10  Subjective Questionnaire: 30 second STS with UE support: 7 times at 22.5 inch table; TU.3 seconds with rollator     Clinical Progress: unchanged  Home Program Compliance: N/A  Treatment has included: therapeutic exercise, neuromuscular re-education, manual therapy, therapeutic activity, and gait training    Subjective Pt reports he feels like he has stayed the same since last progress note. Pt reports he hasn't been able to exercise at home very much.     Objective     Strength/Myotome Testing     Left Shoulder      Planes of Motion   Flexion: 5  Abduction: 5  External rotation at 0°: 5  Internal rotation at 0°: 5     Isolated Muscles   Biceps: 5  Triceps: 5    Right Shoulder      Planes of Motion   Flexion: 4+  Abduction: 4+  External rotation at 0°: 5  Internal rotation at 0°: 5     Isolated Muscles   Biceps: 5  Triceps: 5     Left Hip   Planes of Motion   Flexion: 2  Abduction: 3-  Adduction: 3-     Right Hip   Planes of Motion   Flexion: 2+  Abduction: 3-  Adduction: 3-     Left Knee   Flexion: 4  Extension: 4     Right Knee   Flexion: 4-  Extension: 4-     Left Ankle/Foot   Dorsiflexion: 0     Right Ankle/Foot   Dorsiflexion: 0    R  strength: 85 pounds  L  strength: 55 pounds         Feet shoulder width apart: 30 seconds  Feet together EO: 30 seconds  Tandem stance:R foot in front: wide staggered stance: 15 seconds and L foot in front: wide staggered stance: 5 seconds        Assessment/Plan    Overall patient has demonstrated a decrease in time with his TUG test with a decrease from 19.5 seconds to 20.3 seconds. Pt did  improve STS test by 0.5 stand. Pt demonstrated a decrease in balance testing with tandem stance by 1 second. Pt also demonstrated a slight decrease in R shoulder flexion and abduction strength and B hip flexor strength. Overall, due to patient's plateau of progress over the last several progress notes will be discharging patient after next scheduled appointment. Discussed with patient about exercises he can perform at home and patient discussed about going to [employer] and discussed with patient exercises he can also perform at the gym. Will see patient for one more session and then discharge.     Plan Goals:   1. Mobility: Walking/Moving Around Functional Limitation                               LTG 1: 12 weeks:  The patient will demonstrate TUG score <16 seconds for reduced fall risk.                           STATUS:  IN PROGRESS              STG 1a: 6 weeks:  The patient will demonstrate TUG score <18 seconds for reduced fall risk.                           STATUS:  IN PROGRESS    2. The patient has limited strength of the B shoulders and B hips.              LTG 2: 12 weeks:  The patient will demonstrate 4+ /5 strength for B shoulder flexion, abduction, external rotation, and internal rotation in order to demonstrate improved shoulder stability.                          STATUS:  MET              LTG 2a: 12 weeks:  The patient will demonstrate 4+/5 strength for B hip flexion, abduction, and extension in order to improve hip stability.                          STATUS:  IN PROGRESS    3. The patient has gait dysfunction.              LTG 3: 12 weeks:  The patient will demonstrate > 22 / 30 on the Functional Gait Assessment for improved functional mobility.                            STATUS:  IN PROGRESS              STG 3a: The patient will demonstrate > 18 / 30 on the Functional Gait Assessment for improved functional mobility.                            STATUS:  IN PROGRESS     4. The patient has difficulty  with balance.               LTG 4: 12 weeks: The patient will hold the sharpened romberg position on open with eyes open for 20 seconds in order to improve balance and decrease risk of falling.                           STATUS: IN PROGRESS              STG 4: 6 weeks: The patient will hold the romberg position for 20 seconds in order to improve balance and decrease risk of falling.                           STATUS: IN PROGRESS       Progress toward previous goals: Partially Met    See Exercise, Manual, and Modality Logs for complete treatment.         Recommendations: Continue as planned  Timeframe:  one more session  Prognosis to achieve goals: good    PT SIGNATURE: Electronically signed by Tal Guerra, MARIXA  [place] LICENSE: [id number]    Based upon review of the patient's progress and continued therapy plan, it is my medical opinion that Lj Crenshaw should continue physical therapy treatment at [facility]  [address] [facility]  [place] [address] [phone].      Timed:                 Manual Therapy:    0     mins  89661;     Therapeutic Exercise:    15     mins  29047;     Neuromuscular Reyna:    8    mins  45879;    Therapeutic Activity:     15     mins  34220;     Gait Trainin     mins  97520;     Ultrasound:     0     mins  38859;    Ionto                               0    mins   72213  Self pay                         0     mins [email or web address]    Un-Timed:  Electrical Stimulation:    0     mins  07843 ( )  Canalith Repos    0     mins 98551  Dry Needling     0     mins self-pay  Traction     0     mins 65675    Timed Treatment:   38   mins   Total Treatment:     38   mins      I certify that the therapy services are furnished while this patient is under my care.  The services outlined above are required by this patient, and will be reviewed every 90 days.

## 2024-02-01 ENCOUNTER — TELEPHONE (OUTPATIENT)
Dept: FAMILY MEDICINE CLINIC | Facility: CLINIC | Age: 74
End: 2024-02-01
Payer: COMMERCIAL

## 2024-02-01 NOTE — TELEPHONE ENCOUNTER
Called and spoke with patient.  States his physical therapy will be ending this Friday 02/02/2024.    Therapist suggested he go to [facility] instead of PT.  Patient would prefer to continue with them because it is more structured and he gets more out of it.  Patient still difficulty standing, walking and rotating from chair to chair or bed.    Wants to know if should have another order to continue with Physical Therapy.

## 2024-02-02 ENCOUNTER — TREATMENT (OUTPATIENT)
Dept: PHYSICAL THERAPY | Facility: CLINIC | Age: 74
End: 2024-02-02
Payer: MEDICARE

## 2024-02-02 DIAGNOSIS — R53.1 GENERALIZED WEAKNESS: Primary | ICD-10-CM

## 2024-02-02 DIAGNOSIS — R27.8 COORDINATION IMPAIRMENT: ICD-10-CM

## 2024-02-02 DIAGNOSIS — R26.9 GAIT DISTURBANCE: ICD-10-CM

## 2024-02-02 DIAGNOSIS — R26.89 BALANCE PROBLEM: ICD-10-CM

## 2024-02-02 NOTE — PROGRESS NOTES
Outpatient Physical Therapy  [address] [facility], [place] [address]                            Physical Therapy Daily Treatment Note    Patient: Lj Crenshaw   : 1950  Diagnosis/ICD-10 Code:  Generalized weakness [R53.1]  Referring practitioner: Scott Hernandez III, MD  Date of Initial Visit: Type: THERAPY  Noted: 2023  Today's Date: 2024  Patient seen for 44 sessions           Subjective   Lj Crenshaw reports: that he doesn't feel like he can go to [employer] and get on and off machines by himself.    Objective   General fatigue.  Difficulty off Cybex Rows and Chest Press    See Exercise, Manual, and Modality Logs for complete treatment.     Assessment/Plan  Lj progressing as evident by increased tolerance of PT session, including strengthening and endurance. Pt required moderate to maximum assistance to get off Cybex Rows and Chest Press. Pt would benefit from skilled PT to address strength and endurance deficits, transfer and gait training and any concerns with ADLs.       Progress per Plan of Care         Timed:  Manual Therapy:        mins  47077;  Therapeutic Exercise:    15     mins  34458;     Neuromuscular Reyna:    10    mins  06301;    Therapeutic Activity:     15     mins  19786;     Gait Training:           mins  96509;          Timed Treatment:   40   mins   Total Treatment:     40   mins      Electronically signed:   Trudy Mckay PTA  Physical Therapist Assistant  [facility] License #: [id number]

## 2024-02-13 ENCOUNTER — TELEPHONE (OUTPATIENT)
Dept: FAMILY MEDICINE CLINIC | Facility: CLINIC | Age: 74
End: 2024-02-13
Payer: COMMERCIAL

## 2024-02-13 DIAGNOSIS — R26.89 BALANCE DISORDER: Primary | ICD-10-CM

## 2024-02-29 ENCOUNTER — TELEPHONE (OUTPATIENT)
Dept: FAMILY MEDICINE CLINIC | Facility: CLINIC | Age: 74
End: 2024-02-29
Payer: COMMERCIAL

## 2024-02-29 NOTE — TELEPHONE ENCOUNTER
[employer]    Called and left voice mail message that a physical therapy was placed for him back on 2/14/24 and they said he was already a patient there.  Patient just needs to call [facility] to make an appointment   [phone]

## 2024-02-29 NOTE — TELEPHONE ENCOUNTER
"  Caller: Lj Crenshaw \"Poli\"    Relationship: Self    Best call back number: [phone]     Any additional details: PATIENT DECLINED TO PROVIDED DETAILS WHEN ASKED BUT IS WANTING TO GET BACK INTO PHYSICAL THERAPY.         "

## 2024-03-27 ENCOUNTER — TREATMENT (OUTPATIENT)
Dept: PHYSICAL THERAPY | Facility: CLINIC | Age: 74
End: 2024-03-27
Payer: MEDICARE

## 2024-03-27 DIAGNOSIS — R26.89 BALANCE PROBLEM: ICD-10-CM

## 2024-03-27 DIAGNOSIS — R26.9 GAIT DISTURBANCE: ICD-10-CM

## 2024-03-27 DIAGNOSIS — R53.1 GENERALIZED WEAKNESS: Primary | ICD-10-CM

## 2024-03-27 NOTE — PROGRESS NOTES
Physical Therapy Initial Evaluation and Plan of Care  [address] [place] [id number]    Patient: Lj Crenshaw   : 1950  Diagnosis/ICD-10 Code:  No primary diagnosis found.  Referring practitioner: Andrzej Cardona, *  Date of Initial Visit: 3/27/2024  Today's Date: 3/27/2024  Patient seen for Visit count could not be calculated. Make sure you are using a visit which is associated with an episode. sessions           Subjective Questionnaire:    30 second STS: 5 times with use of UE at 22.5 inch table  TU.25 seconds with rollator       Subjective Evaluation    History of Present Illness  Mechanism of injury: Pt presents to PT with reports of generalized weakness. Pt reports in the last 1-2 years he feels like he has gotten weaker. Pt presents to PT with rollator and reports he uses rollator at all times. Pt reports he also has R drop foot that began sometime after he had his R knee replacement 5 years ago. Pt reports he is unsure what caused his R drop foot, but reports he does wear an AFO on R foot. Pt did have extensive PT last year and was discharged in 2024. Pt reports since then he has been performing arm workouts and walking in stores such as [employer]. Pt reports he hasn't been performing LE workouts. Pt reports since February he feels like he has gotten weaker and therefore would like to begin PT again. Pt reports no falls since last in PT.     Pain  Current pain ratin  At best pain ratin  At worst pain ratin    Patient Goals  Patient goals for therapy: increased strength, improved balance, increased motion, independence with ADLs/IADLs and return to sport/leisure activities             Objective          Strength/Myotome Testing     Left Shoulder     Planes of Motion   Flexion: 5   Abduction: 5   External rotation at 0°: 5   Internal rotation at 0°: 5     Isolated Muscles   Biceps: 5   Triceps: 5     Right Shoulder     Planes of Motion   Flexion: 4-    Abduction: 4-   External rotation at 0°: 4-   Internal rotation at 0°: 4     Isolated Muscles   Biceps: 4   Triceps: 4     Left Hip   Planes of Motion   Flexion: 2+  Abduction: 3-  Adduction: 2    Right Hip   Planes of Motion   Flexion: 2  Abduction: 3-  Adduction: 2    Left Knee   Flexion: 4-  Extension: 4-    Right Knee   Flexion: 3-  Extension: 3-    Left Ankle/Foot   Dorsiflexion: 0    Right Ankle/Foot   Dorsiflexion: 0    Ambulation     Observational Gait     Additional Observational Gait Details  Pt ambulates with forward flexed posture with rollator. Pt compensates for lack of ankle dorsiflexion bilaterally with increase in B hip flexion.     Functional Assessment     Comments  Feet together EO: 30 seconds  Tandem stance:R foot in front: wide staggered stance: 4 seconds and L foot in front: wide staggered stance: Unable           See Exercise, Manual, and Modality Logs for complete treatment.       Assessment & Plan       Assessment  Impairments: abnormal gait, abnormal muscle firing, abnormal muscle tone, abnormal or restricted ROM, activity intolerance, impaired balance, impaired physical strength, lacks appropriate home exercise program, pain with function, safety issue and weight-bearing intolerance   Functional limitations: lifting, walking, uncomfortable because of pain and standing   Assessment details: Pt presents to PT with reports of generalized weakness. Pt has demonstrated a decrease in TUG score and STS test from last time that was assessed. Pt has also demonstrated a decrease in R UE strength and B LE strength since last assessed by PT. Overall, patient has decreased overall functional ability and increased risk of falls and therefore requires skilled PT in order to address deficits listed to return patient back to maximum function. Discussed with patient that will start the first month of therapy addressing gait and LE weakness due to being greatest deficits and then after first month will  include UE strengthening. Also, discussed with patient concern of declining strength and mobility over the course of the last year. Discussed with patient pending on progress made over the next month, may discuss with PCP about needing referral for neurology. Initiated patient's HEP today and discussed with patient to perform daily.   Prognosis: good    Goals  Plan Goals:     1. Mobility: Walking/Moving Around Functional Limitation     LTG 1: 12 weeks:  The patient will demonstrate TUG score <16 seconds and perform 9 STS in 30 seconds for reduced fall risk.     STATUS:  New   STG 1a: 6 weeks:  The patient will demonstrate TUG score <18 seconds  and perform 7 STS in 30 seconds for reduced fall risk.     STATUS:  New   TREATMENT:  Manual therapy, therapeutic exercise, home exercise instruction, and modalities as needed to include: moist heat, electrical stimulation, and ultrasound.      2. The patient has limited strength of the B shoulders and B hips.   LTG 2: 12 weeks:  The patient will demonstrate 4+ /5 strength for B shoulder flexion, abduction, external rotation, and internal rotation in order to demonstrate improved shoulder stability.    STATUS:  New   LTG 2a: 12 weeks:  The patient will demonstrate 4+/5 strength for B hip flexion, abduction, and extension in order to improve hip stability.    STATUS:  New   TREATMENT: Manual therapy, therapeutic exercise, home exercise instruction, and modalities as needed to include: electrical stimulation, ultrasound, moist heat, and ice.     3. The patient has difficulty with balance.    LTG 3: 12 weeks: The patient will hold the romberg position with eyes open for 20 seconds in order to improve balance and decrease risk of falling.     STATUS: New    STG 3: 6 weeks: The patient will hold the romberg position with eyes open for 10 seconds in order to improve balance and decrease risk of falling.     STATUS: New    TREATMENT:  Manual therapy, neuromuscular re-education, gait  training, canalith repositioning maneuver, therapeutic exercise, home exercise instruction, and modalities as needed to include: moist heat, electrical stimulation, and ultrasound.      Plan  Therapy options: will be seen for skilled therapy services  Planned modality interventions: cryotherapy, TENS, electrical stimulation/Russian stimulation, thermotherapy (hydrocollator packs) and hydrotherapy  Planned therapy interventions: manual therapy, neuromuscular re-education, ADL retraining, balance/weight-bearing training, flexibility, functional ROM exercises, gait training, home exercise program, joint mobilization, soft tissue mobilization, strengthening, stretching, therapeutic activities, abdominal trunk stabilization, postural training and spinal/joint mobilization  Frequency: 2x week  Duration in weeks: 12  Treatment plan discussed with: patient        History # of Personal Factors and/or Comorbidities: MODERATE (1-2)  Examination of Body System(s): # of elements: MODERATE (3)  Clinical Presentation: STABLE   Clinical Decision Making: MODERATE      Timed:         Manual Therapy:    0     mins  96399;     Therapeutic Exercise:    8     mins  48452;     Neuromuscular Reyna:    0    mins  47628;    Therapeutic Activity:     0     mins  21951;     Gait Trainin     mins  14570;     Ultrasound:     0     mins  77853;    Ionto                               0    mins   55300  Self pay                         0     mins [id number]    Un-Timed:  Electrical Stimulation:    0     mins  75712 ( )  Traction     0     mins 98080  Low Eval     0     Mins  78855  Mod Eval     40     Mins  54184  High Eval                       0     Mins  50493  Self Pay Eval                 0     PTSP1   Re-Eval                           0    mins  15092        Timed Treatment:   8   mins   Total Treatment:     48   mins    PT SIGNATURE: Electronically signed by Tal Guerra PT  [place] LICENSE: [id number]    DATE TREATMENT  INITIATED: 3/27/2024    Initial Certification  Certification Period: 3/27/2024 thru 6/24/2024  I certify that the therapy services are furnished while this patient is under my care.  The services outlined above are required by this patient, and will be reviewed every 90 days.     PHYSICIAN: Andrzej Cardona MD   NPI: [id number]      DATE:     Please sign and return via fax to [phone] Thank you, [facility].

## 2024-03-28 ENCOUNTER — TELEPHONE (OUTPATIENT)
Dept: FAMILY MEDICINE CLINIC | Facility: CLINIC | Age: 74
End: 2024-03-28
Payer: COMMERCIAL

## 2024-03-28 DIAGNOSIS — R26.89 BALANCE DISORDER: Primary | ICD-10-CM

## 2024-03-28 NOTE — TELEPHONE ENCOUNTER
"    Caller: Lj Crenshaw \"Poli\"    Relationship: Self    Best call back number: [phone]     What are you requesting: LIFT CHAIR     Additional notes: PATIENT IS REQUESTING TO SPEAK WITH CLINICAL STAFF OR PROVIDER REGARDING ORDER FOR LIFT CHAIR.             "

## 2024-04-03 NOTE — TELEPHONE ENCOUNTER
HUB TO SHARE   Called patient regarding wanting a lift chair..  Left message regarding this.   Need to know if the patient has the paperwork that needs filled out.   Or what else we need to do for this.

## 2024-04-05 ENCOUNTER — TREATMENT (OUTPATIENT)
Dept: PHYSICAL THERAPY | Facility: CLINIC | Age: 74
End: 2024-04-05
Payer: MEDICARE

## 2024-04-05 DIAGNOSIS — R26.9 GAIT DISTURBANCE: ICD-10-CM

## 2024-04-05 DIAGNOSIS — R26.89 BALANCE PROBLEM: ICD-10-CM

## 2024-04-05 DIAGNOSIS — R53.1 GENERALIZED WEAKNESS: Primary | ICD-10-CM

## 2024-04-05 NOTE — PROGRESS NOTES
Outpatient Physical Therapy  [address] [facility], [place] [address]                            Physical Therapy Daily Treatment Note    Patient: Lj Crenshaw   : 1950  Diagnosis/ICD-10 Code:  Generalized weakness [R53.1]  Referring practitioner: Andrzej Cardona, *  Date of Initial Visit: Type: THERAPY  Noted: 3/27/2024  Today's Date: 2024  Patient seen for 2 sessions           Subjective   Lj Crenshaw reports: that he still has a lot of trouble going down inclines. States that at Occasion he holds on to the rail and goes down the steps and someone moves his walker for him.     Objective   Increased fatigue with standing exercises.   Unable to perform active Cybex LAQs c 0#.     See Exercise, Manual, and Modality Logs for complete treatment.     Assessment/Plan  Lj progressing as evident by decreased falls and increased tolerance of PT session. Pt required Stand by Assist for Safety throughout PT session, general fatigue. Pt would benefit from skilled PT to address strength and endurance deficits, transfer and gait training and any concerns with ADLs.       Progress per Plan of Care         Timed:  Manual Therapy:        mins  70630;  Therapeutic Exercise:    15     mins  36626;     Neuromuscular Reyna:    10    mins  48383;    Therapeutic Activity:     15     mins  64965;     Gait Training:           mins  94872;          Timed Treatment:   40   mins   Total Treatment:     40   mins      Electronically signed:   Trudy Mckay PTA  Physical Therapist Assistant  [facility] [employer] License #: [id number]

## 2024-04-09 ENCOUNTER — TREATMENT (OUTPATIENT)
Dept: PHYSICAL THERAPY | Facility: CLINIC | Age: 74
End: 2024-04-09
Payer: MEDICARE

## 2024-04-09 DIAGNOSIS — R26.9 GAIT DISTURBANCE: ICD-10-CM

## 2024-04-09 DIAGNOSIS — R53.1 GENERALIZED WEAKNESS: Primary | ICD-10-CM

## 2024-04-09 DIAGNOSIS — R26.89 BALANCE PROBLEM: ICD-10-CM

## 2024-04-09 PROCEDURE — 97112 NEUROMUSCULAR REEDUCATION: CPT | Performed by: PHYSICAL THERAPIST

## 2024-04-09 PROCEDURE — 97530 THERAPEUTIC ACTIVITIES: CPT | Performed by: PHYSICAL THERAPIST

## 2024-04-09 PROCEDURE — 97110 THERAPEUTIC EXERCISES: CPT | Performed by: PHYSICAL THERAPIST

## 2024-04-09 NOTE — PROGRESS NOTES
Outpatient Physical Therapy  [address] [facility], [place] [address]                            Physical Therapy Daily Treatment Note    Patient: Lj Crenshaw   : 1950  Diagnosis/ICD-10 Code:  Generalized weakness [R53.1]  Referring practitioner: Andrzej Cardona, *  Date of Initial Visit: Type: THERAPY  Noted: 3/27/2024  Today's Date: 2024  Patient seen for 3 sessions           Subjective   Lj Crenshaw reports: that he's having some issues with the left hip when he first stands up.     Objective   Increased fatigue with standing UBE    See Exercise, Manual, and Modality Logs for complete treatment.     Assessment/Plan  Lj progressing as evident by decreased falls and increased tolerance of PT session. Pt required Stand By Assist or Contact Guard Assist throughout PT session, general fatigue. Pt would benefit from skilled PT to address strength and endurance deficits, transfer and gait training and any concerns with ADLs.       Progress per Plan of Care         Timed:  Manual Therapy:        mins  21780;  Therapeutic Exercise:    15     mins  42601;     Neuromuscular Reyna:    10    mins  45674;    Therapeutic Activity:     20     mins  76191;     Gait Training:           mins  79111;          Timed Treatment:   45   mins   Total Treatment:     45   mins      Electronically signed:   Trudy Mckay PTA  Physical Therapist Assistant  [facility] License #: [id number]

## 2024-04-12 ENCOUNTER — TREATMENT (OUTPATIENT)
Dept: PHYSICAL THERAPY | Facility: CLINIC | Age: 74
End: 2024-04-12
Payer: MEDICARE

## 2024-04-12 DIAGNOSIS — R53.1 GENERALIZED WEAKNESS: Primary | ICD-10-CM

## 2024-04-12 DIAGNOSIS — R26.9 GAIT DISTURBANCE: ICD-10-CM

## 2024-04-12 DIAGNOSIS — R26.89 BALANCE PROBLEM: ICD-10-CM

## 2024-04-12 NOTE — PROGRESS NOTES
Outpatient Physical Therapy  [address] [facility], [place] [address]                            Physical Therapy Daily Treatment Note    Patient: Lj Crenshaw   : 1950  Diagnosis/ICD-10 Code:  Generalized weakness [R53.1]  Referring practitioner: Andrzej Cardona, *  Date of Initial Visit: Type: THERAPY  Noted: 3/27/2024  Today's Date: 2024  Patient seen for 4 sessions           Subjective   Lj Crenshaw reports: that his left leg, left hip has been bothering him. He couldn't get out of his chair yesterday because it was hurting so bad. States that he did some piriformis stretches and it has gotten better.     Objective   Increased discomfort in L hip throughout PT session.    See Exercise, Manual, and Modality Logs for complete treatment.     Assessment/Plan  Lj progressing as evident by decreased falls and increased tolerance of PT session, although left hip bothering him throughout PT session. Pt would benefit from skilled PT to address strength and endurance deficits, transfer and gait training and any concerns with ADLs.       Progress per Plan of Care         Timed:  Manual Therapy:        mins  31645;  Therapeutic Exercise:    15     mins  04672;     Neuromuscular Reyna:    10    mins  40412;    Therapeutic Activity:     15     mins  34700;     Gait Training:           mins  03800;          Timed Treatment:   40   mins   Total Treatment:     40   mins      Electronically signed:   Trudy Mckay PTA  Physical Therapist Assistant  [facility] License #: [id number]

## 2024-04-12 NOTE — TELEPHONE ENCOUNTER
Patient requesting lift recliner.  Instructed patient to call [facility] and then they will send us a prescription/order for that.  Would also like to know if he could receive occupational therapy.

## 2024-04-17 ENCOUNTER — TREATMENT (OUTPATIENT)
Dept: PHYSICAL THERAPY | Facility: CLINIC | Age: 74
End: 2024-04-17
Payer: MEDICARE

## 2024-04-17 DIAGNOSIS — R53.1 GENERALIZED WEAKNESS: Primary | ICD-10-CM

## 2024-04-17 DIAGNOSIS — R26.89 BALANCE PROBLEM: ICD-10-CM

## 2024-04-17 DIAGNOSIS — R26.9 GAIT DISTURBANCE: ICD-10-CM

## 2024-04-17 PROCEDURE — 97530 THERAPEUTIC ACTIVITIES: CPT | Performed by: PHYSICAL THERAPIST

## 2024-04-17 PROCEDURE — 97112 NEUROMUSCULAR REEDUCATION: CPT | Performed by: PHYSICAL THERAPIST

## 2024-04-17 PROCEDURE — 97110 THERAPEUTIC EXERCISES: CPT | Performed by: PHYSICAL THERAPIST

## 2024-04-17 NOTE — PROGRESS NOTES
Outpatient Physical Therapy  [address] [facility], [place] [address]                            Physical Therapy Daily Treatment Note    Patient: Lj Crenshaw   : 1950  Diagnosis/ICD-10 Code:  Generalized weakness [R53.1]  Referring practitioner: Andrzej Cardona, *  Date of Initial Visit: Type: THERAPY  Noted: 3/27/2024  Today's Date: 2024  Patient seen for 5 sessions           Subjective   Lj Crenshaw reports: that his legs were tired after last PT session.    Objective   Decreased stride length with RLE when ambulating in SOLO Steps.     See Exercise, Manual, and Modality Logs for complete treatment.     Assessment/Plan  Lj progressing as evident by decreased falls and increased tolerance of PT session. Pt required Stand by Assist for Safety throughout PT session, even using SOLO Step for more advanced gait training. Pt would benefit from skilled PT to address strength and endurance deficits, transfer and gait training and any concerns with ADLs.       Progress per Plan of Care         Timed:  Manual Therapy:        mins  92779;  Therapeutic Exercise:    15     mins  13901;     Neuromuscular Reyna:    10    mins  41108;    Therapeutic Activity:     15     mins  62540;     Gait Training:           mins  66858;          Timed Treatment:   40   mins   Total Treatment:     40   mins      Electronically signed:   Trudy Mckay PTA  Physical Therapist Assistant  [facility] License #: [id number]

## 2024-04-19 ENCOUNTER — TREATMENT (OUTPATIENT)
Dept: PHYSICAL THERAPY | Facility: CLINIC | Age: 74
End: 2024-04-19
Payer: MEDICARE

## 2024-04-19 DIAGNOSIS — R53.1 GENERALIZED WEAKNESS: Primary | ICD-10-CM

## 2024-04-19 DIAGNOSIS — R26.9 GAIT DISTURBANCE: ICD-10-CM

## 2024-04-19 DIAGNOSIS — R26.89 BALANCE PROBLEM: ICD-10-CM

## 2024-04-19 NOTE — PROGRESS NOTES
Outpatient Physical Therapy  [address] [facility], [place] [address]                            Physical Therapy Daily Treatment Note    Patient: Lj Crenshaw   : 1950  Diagnosis/ICD-10 Code:  Generalized weakness [R53.1]  Referring practitioner: Andrzej Cardona, *  Date of Initial Visit: Type: THERAPY  Noted: 3/27/2024  Today's Date: 2024  Patient seen for 6 sessions           Subjective   Lj Crenshaw reports: his legs were tired from last PT session. Denies any pain or LOB since last visit.    Objective       See Exercise, Manual, and Modality Logs for complete treatment.     Assessment/Plan  Lj presents to therapy this session with weakness in B LE. Pt able to increase activity tolerance and endurance this session without any discomfort or pain. Pt began to fatigue toward the end of PT session, but motivated to finish session.  Pt required Stand by Assist for Safety throughout PT session especially with gait training with rollator. Pt would benefit from skilled PT to address strength and endurance deficits, transfer and gait training and any concerns with ADLs.       Progress per Plan of Care         Timed:  Manual Therapy:        mins  41072;  Therapeutic Exercise:    20     mins  22295;     Neuromuscular Reyna:    10    mins  44578;    Therapeutic Activity:     15     mins  81143;     Gait Training:           mins  59012;          Timed Treatment:   45   mins   Total Treatment:     45   mins      Electronically signed:   Treva Ingram PTA Student    Supervised By:  Tal Guerra, PT  Physical Therapist Assistant  \[facility]\"" License #: [id number]

## 2024-04-22 ENCOUNTER — TELEPHONE (OUTPATIENT)
Dept: FAMILY MEDICINE CLINIC | Facility: CLINIC | Age: 74
End: 2024-04-22
Payer: COMMERCIAL

## 2024-04-22 DIAGNOSIS — R26.89 BALANCE DISORDER: ICD-10-CM

## 2024-04-22 DIAGNOSIS — M21.371 FOOT DROP, RIGHT: Primary | ICD-10-CM

## 2024-04-22 NOTE — TELEPHONE ENCOUNTER
Patient is needing script for large lift chair.  Having mor difficulty getting out of chair and is so tall that a regular lift chair will not work.   Due to foot drop  and balance issue

## 2024-04-23 ENCOUNTER — TREATMENT (OUTPATIENT)
Dept: PHYSICAL THERAPY | Facility: CLINIC | Age: 74
End: 2024-04-23
Payer: MEDICARE

## 2024-04-23 DIAGNOSIS — R53.1 GENERALIZED WEAKNESS: Primary | ICD-10-CM

## 2024-04-23 DIAGNOSIS — R26.89 BALANCE PROBLEM: ICD-10-CM

## 2024-04-23 DIAGNOSIS — R26.9 GAIT DISTURBANCE: ICD-10-CM

## 2024-04-23 PROCEDURE — 97530 THERAPEUTIC ACTIVITIES: CPT | Performed by: PHYSICAL THERAPIST

## 2024-04-23 PROCEDURE — 97112 NEUROMUSCULAR REEDUCATION: CPT | Performed by: PHYSICAL THERAPIST

## 2024-04-23 PROCEDURE — 97110 THERAPEUTIC EXERCISES: CPT | Performed by: PHYSICAL THERAPIST

## 2024-04-23 NOTE — PROGRESS NOTES
Outpatient Physical Therapy  [address] [facility], [place] [address]                            Physical Therapy Daily Treatment Note    Patient: Lj Crenshaw   : 1950  Diagnosis/ICD-10 Code:  Generalized weakness [R53.1]  Referring practitioner: Andrzej Cardona, *  Date of Initial Visit: Type: THERAPY  Noted: 3/27/2024  Today's Date: 2024  Patient seen for 7 sessions           Subjective   Lj Crenshaw reports: Pt states his legs were a little tired after last PT session, but then he felt better the next day.    Objective       See Exercise, Manual, and Modality Logs for complete treatment.     Assessment/Plan  Lj presents to therapy this session with a slower garcía when ambulating. Pt tolerated exercises well, just general fatigue. Pt able to increase endurance tolerance with ambulation, however became very fatigued after. Pt would benefit from skilled PT to address Strength deficits, pain management and any concerns with ADLs.       Progress per Plan of Care         Timed:  Manual Therapy:        mins  07637;  Therapeutic Exercise:    23     mins  04566;     Neuromuscular Reyna:    10    mins  68693;    Therapeutic Activity:     13     mins  45389;     Gait Training:           mins  02363;          Timed Treatment:   46   mins   Total Treatment:      46  mins      Electronically signed:   Treva Ingram PTA Student    Supervised By:  Trudy Mckay PTA  Physical Therapist Assistant  [facility] License #: [id number]

## 2024-04-26 ENCOUNTER — TREATMENT (OUTPATIENT)
Dept: PHYSICAL THERAPY | Facility: CLINIC | Age: 74
End: 2024-04-26
Payer: MEDICARE

## 2024-04-26 DIAGNOSIS — R26.89 BALANCE PROBLEM: ICD-10-CM

## 2024-04-26 DIAGNOSIS — R53.1 GENERALIZED WEAKNESS: Primary | ICD-10-CM

## 2024-04-26 DIAGNOSIS — R26.9 GAIT DISTURBANCE: ICD-10-CM

## 2024-04-26 NOTE — PROGRESS NOTES
Progress Assessment  [address] [place] [id number]        Patient: Lj Crenshaw   : 1950  Diagnosis/ICD-10 Code:  Generalized weakness [R53.1]  Referring practitioner: Andrzej Cardona, *  Date of Initial Visit: Type: THERAPY  Noted: 3/27/2024  Today's Date: 2024  Patient seen for 8 sessions      Subjective:   Lj Crenshaw reports: 0/10  Subjective Questionnaire:     30 second STS: 7 times with use of UE at 22.5 inch table  TU.9 seconds with rollator with airex on last trial    Clinical Progress: improved  Home Program Compliance: Yes  Treatment has included: therapeutic exercise, neuromuscular re-education, manual therapy, therapeutic activity, and gait training    Subjective Pt reports he feels like he has gotten stronger in the last month of therapy. Pt reports his R knee does still feel weak. Pt reports he really likes all of the LE exercises he has been performing.      Objective     Strength/Myotome Testing      Left Shoulder      Planes of Motion   Flexion: 5   Abduction: 5   External rotation at 0°: 5   Internal rotation at 0°: 5      Isolated Muscles   Biceps: 5   Triceps: 5      Right Shoulder      Planes of Motion   Flexion: 4+   Abduction: 4+  External rotation at 0°: 4+  Internal rotation at 0°: 4+     Isolated Muscles   Biceps: 4+  Triceps: 4+     Left Hip   Planes of Motion   Flexion: 3-  Abduction: 3+  Adduction: 2     Right Hip   Planes of Motion   Flexion: 3-  Abduction: 3+  Adduction: 2     Left Knee   Flexion: 4  Extension: 4     Right Knee   Flexion: 3+  Extension: 3+     Left Ankle/Foot   Dorsiflexion: 0     Right Ankle/Foot   Dorsiflexion: 0    Comments  Feet together EO: 30 seconds  Tandem stance:R foot in front: wide staggered stance: 8.4 seconds and L foot in front: wide staggered stance: 3.6 seconds       Assessment/Plan    Pt has demonstrated improvements in TUG score, 30 second STS, wide staggered stance balance, and overall strength of both  LE and upper extremities since IE. Pt has also demonstrated an improvement in endurance as demonstrated by the amount of laps he is now able to take around the clinic before requiring a rest break. Pt does still have strength, balance, and endurance deficits that requires skilled PT to address to return patient back to maximum function. Will continue to progress patient as able.     Goals  Plan Goals:      1. Mobility: Walking/Moving Around Functional Limitation                               LTG 1: 12 weeks:  The patient will demonstrate TUG score <16 seconds and perform 9 STS in 30 seconds for reduced fall risk.                           STATUS:  IN PROGRESS              STG 1a: 6 weeks:  The patient will demonstrate TUG score <18 seconds  and perform 7 STS in 30 seconds for reduced fall risk.                           STATUS:  STS goal MET but not TUG              TREATMENT:  Manual therapy, therapeutic exercise, home exercise instruction, and modalities as needed to include: moist heat, electrical stimulation, and ultrasound.        2. The patient has limited strength of the B shoulders and B hips.              LTG 2: 12 weeks:  The patient will demonstrate 4+ /5 strength for B shoulder flexion, abduction, external rotation, and internal rotation in order to demonstrate improved shoulder stability.                          STATUS:  MET              LTG 2a: 12 weeks:  The patient will demonstrate 4+/5 strength for B hip flexion, abduction, and extension in order to improve hip stability.                          STATUS:  IN PROGRESS              TREATMENT: Manual therapy, therapeutic exercise, home exercise instruction, and modalities as needed to include: electrical stimulation, ultrasound, moist heat, and ice.      3. The patient has difficulty with balance.               LTG 3: 12 weeks: The patient will hold the romberg position with eyes open for 20 seconds in order to improve balance and decrease risk of  falling.                           STATUS: IN PROGRESS              [facility] 3: 6 weeks: The patient will hold the romberg position with eyes open for 10 seconds in order to improve balance and decrease risk of falling.                           STATUS: IN PROGRESS              TREATMENT:  Manual therapy, neuromuscular re-education, gait training, canalith repositioning maneuver, therapeutic exercise, home exercise instruction, and modalities as needed to include: moist heat, electrical stimulation, and ultrasound.        Progress toward previous goals: Partially Met    See Exercise, Manual, and Modality Logs for complete treatment.         Recommendations: Continue as planned  Timeframe:2x a week for 2 months  Prognosis to achieve goals: good    PT SIGNATURE: Electronically signed by Tal Guerra, PT  [place] LICENSE: [id number]    Based upon review of the patient's progress and continued therapy plan, it is my medical opinion that Lj Crenshaw should continue physical therapy treatment at [facility] PHYSICAL THERAPY  [address] [place] [address]  [place] [address] [phone].      Timed:                 Manual Therapy:    0     mins  28146;     Therapeutic Exercise:    8     mins  95595;     Neuromuscular Reyna:    15    mins  24833;    Therapeutic Activity:     15     mins  78819;     Gait Trainin     mins  13124;     Ultrasound:     0     mins  12870;    Ionto                               0    mins   93244  Self pay                         0     mins [email or web address]    Un-Timed:  Electrical Stimulation:    0     mins  46804 ( )  Canalith Repos    0     mins 55540  Dry Needling     0     mins self-pay  Traction     0     mins 68683    Timed Treatment:   38   mins   Total Treatment:     38   mins      I certify that the therapy services are furnished while this patient is under my care.  The services outlined above are required by this patient, and will be reviewed every 90  days.

## 2024-04-30 ENCOUNTER — TREATMENT (OUTPATIENT)
Dept: PHYSICAL THERAPY | Facility: CLINIC | Age: 74
End: 2024-04-30
Payer: MEDICARE

## 2024-04-30 DIAGNOSIS — R26.89 BALANCE PROBLEM: ICD-10-CM

## 2024-04-30 DIAGNOSIS — R53.1 GENERALIZED WEAKNESS: Primary | ICD-10-CM

## 2024-04-30 DIAGNOSIS — R26.9 GAIT DISTURBANCE: ICD-10-CM

## 2024-04-30 PROCEDURE — 97110 THERAPEUTIC EXERCISES: CPT | Performed by: PHYSICAL THERAPIST

## 2024-04-30 PROCEDURE — 97530 THERAPEUTIC ACTIVITIES: CPT | Performed by: PHYSICAL THERAPIST

## 2024-04-30 PROCEDURE — 97112 NEUROMUSCULAR REEDUCATION: CPT | Performed by: PHYSICAL THERAPIST

## 2024-04-30 NOTE — PROGRESS NOTES
Outpatient Physical Therapy  [address] [facility], [place] [address]                            Physical Therapy Daily Treatment Note    Patient: Lj Crenshaw   : 1950  Diagnosis/ICD-10 Code:  Generalized weakness [R53.1]  Referring practitioner: Andrzej Cardona, *  Date of Initial Visit: Type: THERAPY  Noted: 3/27/2024  Today's Date: 2024  Patient seen for 9 sessions           Subjective   Lj Crenshaw reports: his right knee is feeling weak today. Denies any falls.    Objective       See Exercise, Manual, and Modality Logs for complete treatment.     Assessment/Plan  Lj presents to therapy this session with an increase in activity tolerance. Pt demo slower garcía due to new tennis shoes.  Pt required Stand by Assist for Safety throughout PT session, especially with ambulation. Pt would benefit from skilled PT to address strength and endurance deficits, transfer and gait training and any concerns with ADLs.       Progress per Plan of Care         Timed:  Manual Therapy:        mins  88893;  Therapeutic Exercise:    15     mins  79599;     Neuromuscular Reyna:    10    mins  50772;    Therapeutic Activity:     15     mins  98841;     Gait Training:           mins  58296;          Timed Treatment:   40   mins   Total Treatment:     40   mins      Electronically signed:   Treva Ingram PTA Student    Supervised By:  Trudy Mckay PTA  Physical Therapist Assistant  [facility] License #: [id number]

## 2024-05-03 ENCOUNTER — TREATMENT (OUTPATIENT)
Dept: PHYSICAL THERAPY | Facility: CLINIC | Age: 74
End: 2024-05-03
Payer: MEDICARE

## 2024-05-03 DIAGNOSIS — R26.89 BALANCE PROBLEM: ICD-10-CM

## 2024-05-03 DIAGNOSIS — R53.1 GENERALIZED WEAKNESS: Primary | ICD-10-CM

## 2024-05-03 DIAGNOSIS — R26.9 GAIT DISTURBANCE: ICD-10-CM

## 2024-05-03 PROCEDURE — 97530 THERAPEUTIC ACTIVITIES: CPT | Performed by: PHYSICAL THERAPIST

## 2024-05-03 PROCEDURE — 97110 THERAPEUTIC EXERCISES: CPT | Performed by: PHYSICAL THERAPIST

## 2024-05-03 PROCEDURE — 97112 NEUROMUSCULAR REEDUCATION: CPT | Performed by: PHYSICAL THERAPIST

## 2024-05-03 NOTE — PROGRESS NOTES
Outpatient Physical Therapy  [address] [facility], [place] [address]                            Physical Therapy Daily Treatment Note    Patient: Lj Crenshaw   : 1950  Diagnosis/ICD-10 Code:  Generalized weakness [R53.1]  Referring practitioner: Andrzej Cardona, *  Date of Initial Visit: Type: THERAPY  Noted: 3/27/2024  Today's Date: 5/3/2024  Patient seen for 10 sessions           Subjective   Lj Crenshaw reports: that his legs were a little sore after last PT session. He goes to see his MD on Monday.    Objective   General fatigue.    See Exercise, Manual, and Modality Logs for complete treatment.     Assessment/Plan  Lj progressing as evident by decreased falls and increased tolerance of PT session. Pt required Stand by Assist for Safety throughout PT session, general fatigue. Pt would benefit from skilled PT to address strength and endurance deficits, transfer and gait training and any concerns with ADLs.       Progress per Plan of Care         Timed:  Manual Therapy:        mins  44698;  Therapeutic Exercise:    15     mins  09276;     Neuromuscular Reyna:    15    mins  73454;    Therapeutic Activity:     15     mins  27372;     Gait Training:           mins  64611;      Timed Treatment:   45   mins   Total Treatment:     45   mins      Electronically signed:   Trudy Mckay PTA  Physical Therapist Assistant  [facility] License #: [id number]

## 2024-05-06 ENCOUNTER — OFFICE VISIT (OUTPATIENT)
Dept: FAMILY MEDICINE CLINIC | Facility: CLINIC | Age: 74
End: 2024-05-06
Payer: MEDICARE

## 2024-05-06 VITALS
HEART RATE: 90 BPM | WEIGHT: 274 LBS | TEMPERATURE: 98 F | SYSTOLIC BLOOD PRESSURE: 125 MMHG | HEIGHT: 78 IN | DIASTOLIC BLOOD PRESSURE: 75 MMHG | BODY MASS INDEX: 31.7 KG/M2 | OXYGEN SATURATION: 93 %

## 2024-05-06 DIAGNOSIS — M16.9 HIP ARTHROSIS: ICD-10-CM

## 2024-05-06 DIAGNOSIS — Z12.11 COLON CANCER SCREENING: ICD-10-CM

## 2024-05-06 DIAGNOSIS — I10 ESSENTIAL HYPERTENSION: ICD-10-CM

## 2024-05-06 DIAGNOSIS — R68.89 UNABLE TO STAND UP: Primary | ICD-10-CM

## 2024-05-06 PROCEDURE — 1170F FXNL STATUS ASSESSED: CPT | Performed by: STUDENT IN AN ORGANIZED HEALTH CARE EDUCATION/TRAINING PROGRAM

## 2024-05-06 PROCEDURE — G0439 PPPS, SUBSEQ VISIT: HCPCS | Performed by: STUDENT IN AN ORGANIZED HEALTH CARE EDUCATION/TRAINING PROGRAM

## 2024-05-06 PROCEDURE — 99214 OFFICE O/P EST MOD 30 MIN: CPT | Performed by: STUDENT IN AN ORGANIZED HEALTH CARE EDUCATION/TRAINING PROGRAM

## 2024-05-06 PROCEDURE — 3074F SYST BP LT 130 MM HG: CPT | Performed by: STUDENT IN AN ORGANIZED HEALTH CARE EDUCATION/TRAINING PROGRAM

## 2024-05-06 PROCEDURE — 3078F DIAST BP <80 MM HG: CPT | Performed by: STUDENT IN AN ORGANIZED HEALTH CARE EDUCATION/TRAINING PROGRAM

## 2024-05-07 ENCOUNTER — TREATMENT (OUTPATIENT)
Dept: PHYSICAL THERAPY | Facility: CLINIC | Age: 74
End: 2024-05-07
Payer: MEDICARE

## 2024-05-07 DIAGNOSIS — R26.89 BALANCE PROBLEM: ICD-10-CM

## 2024-05-07 DIAGNOSIS — R26.9 GAIT DISTURBANCE: ICD-10-CM

## 2024-05-07 DIAGNOSIS — R53.1 GENERALIZED WEAKNESS: Primary | ICD-10-CM

## 2024-05-07 PROCEDURE — 97110 THERAPEUTIC EXERCISES: CPT | Performed by: PHYSICAL THERAPIST

## 2024-05-07 PROCEDURE — 97530 THERAPEUTIC ACTIVITIES: CPT | Performed by: PHYSICAL THERAPIST

## 2024-05-07 PROCEDURE — 97112 NEUROMUSCULAR REEDUCATION: CPT | Performed by: PHYSICAL THERAPIST

## 2024-05-10 ENCOUNTER — TREATMENT (OUTPATIENT)
Dept: PHYSICAL THERAPY | Facility: CLINIC | Age: 74
End: 2024-05-10
Payer: MEDICARE

## 2024-05-10 DIAGNOSIS — R53.1 GENERALIZED WEAKNESS: Primary | ICD-10-CM

## 2024-05-10 DIAGNOSIS — R26.89 BALANCE PROBLEM: ICD-10-CM

## 2024-05-10 DIAGNOSIS — R26.9 GAIT DISTURBANCE: ICD-10-CM

## 2024-05-14 ENCOUNTER — TREATMENT (OUTPATIENT)
Dept: PHYSICAL THERAPY | Facility: CLINIC | Age: 74
End: 2024-05-14
Payer: MEDICARE

## 2024-05-14 DIAGNOSIS — R26.89 BALANCE PROBLEM: ICD-10-CM

## 2024-05-14 DIAGNOSIS — R53.1 GENERALIZED WEAKNESS: Primary | ICD-10-CM

## 2024-05-14 DIAGNOSIS — R26.9 GAIT DISTURBANCE: ICD-10-CM

## 2024-05-14 PROCEDURE — 97112 NEUROMUSCULAR REEDUCATION: CPT | Performed by: PHYSICAL THERAPIST

## 2024-05-14 PROCEDURE — 97110 THERAPEUTIC EXERCISES: CPT | Performed by: PHYSICAL THERAPIST

## 2024-05-14 PROCEDURE — 97530 THERAPEUTIC ACTIVITIES: CPT | Performed by: PHYSICAL THERAPIST

## 2024-05-14 NOTE — PROGRESS NOTES
Outpatient Physical Therapy  [address] [facility], [place] [address]                            Physical Therapy Daily Treatment Note    Patient: Lj Crenshaw   : 1950  Diagnosis/ICD-10 Code:  Generalized weakness [R53.1]  Referring practitioner: Andrzej Cardona, *  Date of Initial Visit: Type: THERAPY  Noted: 3/27/2024  Today's Date: 2024  Patient seen for 13 sessions           Subjective   Lj Crenshaw reports: that he is trying to do a little more walking.     Objective   General fatigue with 4# ankle weights    See Exercise, Manual, and Modality Logs for complete treatment.     Assessment/Plan  Lj progressing as evident by increased tolerance of PT session. Pt required Stand by Assist for Safety throughout PT session, just general fatigue. Pt would benefit from skilled PT to address strength and endurance deficits, transfer and gait training and any concerns with ADLs.       Progress per Plan of Care         Timed:  Manual Therapy:        mins  60903;  Therapeutic Exercise:    15     mins  90288;     Neuromuscular Reyna:    10    mins  53373;    Therapeutic Activity:     15     mins  84434;     Gait Training:           mins  00662;          Timed Treatment:   40   mins   Total Treatment:     40   mins      Electronically signed:   Trudy Mckay PTA  Physical Therapist Assistant  [facility] License #: [id number]

## 2024-05-17 ENCOUNTER — TREATMENT (OUTPATIENT)
Dept: PHYSICAL THERAPY | Facility: CLINIC | Age: 74
End: 2024-05-17
Payer: MEDICARE

## 2024-05-17 DIAGNOSIS — R53.1 GENERALIZED WEAKNESS: Primary | ICD-10-CM

## 2024-05-17 DIAGNOSIS — R26.89 BALANCE PROBLEM: ICD-10-CM

## 2024-05-17 DIAGNOSIS — R26.9 GAIT DISTURBANCE: ICD-10-CM

## 2024-05-17 NOTE — PROGRESS NOTES
"   Outpatient Physical Therapy  [address], [place] [address]                            Physical Therapy Daily Treatment Note    Patient: Lj Crenshaw   : 1950  Diagnosis/ICD-10 Code:  Generalized weakness [R53.1]  Referring practitioner: Andrzej Cardona, *  Date of Initial Visit: Type: THERAPY  Noted: 3/27/2024  Today's Date: 2024  Patient seen for 14 sessions           Subjective   Lj Crenshaw reports: that they have been pretty active already this morning, states that he's been in and out of the house about 3 times. He has 3 steps into the house, his legs are \"feeling like putty\" right now.     Scrap on leg from dog jumping down and getting him with his nails.     Objective   General fatigue.    See Exercise, Manual, and Modality Logs for complete treatment.     Assessment/Plan  Lj progressing as evident by decreased falls and increased tolerance of PT session. Pt required Stand by Assist for Safety throughout PT session, minimal LOB with sit to stand. Pt would benefit from skilled PT to address strength and endurance deficits, transfer and gait training and any concerns with ADLs.     Progress per Plan of Care         Timed:  Manual Therapy:        mins  48082;  Therapeutic Exercise:    15     mins  48985;     Neuromuscular Reyna:    10    mins  87172;    Therapeutic Activity:     15     mins  63716;     Gait Training:           mins  28664;          Timed Treatment:   40   mins   Total Treatment:     40   mins      Electronically signed:   Trudy Mckay PTA  Physical Therapist Assistant  [facility] License #: [id number]  "

## 2024-05-21 ENCOUNTER — TREATMENT (OUTPATIENT)
Dept: PHYSICAL THERAPY | Facility: CLINIC | Age: 74
End: 2024-05-21
Payer: MEDICARE

## 2024-05-21 DIAGNOSIS — R53.1 GENERALIZED WEAKNESS: Primary | ICD-10-CM

## 2024-05-21 DIAGNOSIS — R26.9 GAIT DISTURBANCE: ICD-10-CM

## 2024-05-21 DIAGNOSIS — R26.89 BALANCE PROBLEM: ICD-10-CM

## 2024-05-21 PROCEDURE — 97112 NEUROMUSCULAR REEDUCATION: CPT | Performed by: PHYSICAL THERAPIST

## 2024-05-21 PROCEDURE — 97110 THERAPEUTIC EXERCISES: CPT | Performed by: PHYSICAL THERAPIST

## 2024-05-21 PROCEDURE — 97530 THERAPEUTIC ACTIVITIES: CPT | Performed by: PHYSICAL THERAPIST

## 2024-05-21 NOTE — PROGRESS NOTES
Outpatient Physical Therapy  [address] [facility], [place] [address]                            Physical Therapy Daily Treatment Note    Patient: Lj Crenshaw   : 1950  Diagnosis/ICD-10 Code:  Generalized weakness [R53.1]  Referring practitioner: Andrzej Cardona, *  Date of Initial Visit: Type: THERAPY  Noted: 3/27/2024  Today's Date: 2024  Patient seen for 15 sessions           Subjective   Lj Crenshaw reports: that he tried to walk around more this morning. He is wearing a knee sleeve to see if it helps the weakness in his right knee.     Objective   General fatigue.    See Exercise, Manual, and Modality Logs for complete treatment.     Assessment/Plan  Lj progressing as evident by decreased falls and increased tolerance of PT session. Pt required Stand by Assist for Safety throughout PT session. Pt would benefit from skilled PT to address strength and endurance deficits, transfer and gait training and any concerns with ADLs.       Progress per Plan of Care         Timed:  Manual Therapy:        mins  20052;  Therapeutic Exercise:    15     mins  04516;     Neuromuscular Reyna:    10    mins  54774;    Therapeutic Activity:     15     mins  98625;     Gait Training:           mins  63030;          Timed Treatment:   40   mins   Total Treatment:     40   mins      Electronically signed:   Trudy Mckay PTA  Physical Therapist Assistant  [facility] License #: [id number]

## 2024-05-24 ENCOUNTER — TREATMENT (OUTPATIENT)
Dept: PHYSICAL THERAPY | Facility: CLINIC | Age: 74
End: 2024-05-24
Payer: MEDICARE

## 2024-05-24 DIAGNOSIS — R53.1 GENERALIZED WEAKNESS: Primary | ICD-10-CM

## 2024-05-24 DIAGNOSIS — R26.9 GAIT DISTURBANCE: ICD-10-CM

## 2024-05-24 DIAGNOSIS — R26.89 BALANCE PROBLEM: ICD-10-CM

## 2024-05-24 NOTE — PROGRESS NOTES
Progress Assessment  [address] [place] [address]        Patient: Lj Crenshaw   : 1950  Diagnosis/ICD-10 Code:  Generalized weakness [R53.1]  Referring practitioner: Andrzej Cardona, *  Date of Initial Visit: Type: THERAPY  Noted: 3/27/2024  Today's Date: 2024  Patient seen for 16 sessions      Subjective:   Lj Crenshaw reports: 0/10  Subjective Questionnaire:     30 second STS: 7 times with use of UE at 22.5 inch table  TU.9 seconds with rollator with airex in chair    Clinical Progress: improved  Home Program Compliance: Yes  Treatment has included: therapeutic exercise, neuromuscular re-education, manual therapy, therapeutic activity, and gait training    Subjective     Pt reports he feels like therapy is really helping and he does feel like he is getting stronger. Pt reports he has been compliant with HEP.       Objective     Strength/Myotome Testing      Left Shoulder      Planes of Motion   Flexion: 5   Abduction: 5   External rotation at 0°: 5   Internal rotation at 0°: 5      Isolated Muscles   Biceps: 5   Triceps: 5      Right Shoulder      Planes of Motion   Flexion: 5  Abduction: 5  External rotation at 0°: 5  Internal rotation at 0°: 5     Isolated Muscles   Biceps: 5  Triceps: 5     Left Hip   Planes of Motion   Flexion: 3-  Abduction: 3+  Adduction: 2     Right Hip   Planes of Motion   Flexion: 3-  Abduction: 3+  Adduction: 2     Left Knee   Flexion: 4  Extension: 4     Right Knee   Flexion: 3+  Extension: 3+     Left Ankle/Foot   Dorsiflexion: 0     Right Ankle/Foot   Dorsiflexion: 0     Comments  Feet together EO: 4 seconds  Tandem stance:R foot in front: wide staggered stance: 6.2 seconds and L foot in front: wide staggered stance: 16.0 seconds       Assessment/Plan    Pt was able to maintain TUG score and STS test score today compared to last previous progress note. Pt has made progress with improving B UE strength and staggered stance with L foot  in front compared to last session. Pt has also built endurance with exercises and is able to perform more exercises in therapy session. Pt does still have decrease in strength and balance that requires skilled PT in order to address to return patient back to maximum function and decrease patient's risk of falls. Will continue to progress patient as able.     Goals  Plan Goals:      1. Mobility: Walking/Moving Around Functional Limitation                               LTG 1: 12 weeks:  The patient will demonstrate TUG score <16 seconds and perform 9 STS in 30 seconds for reduced fall risk.                           STATUS:  IN PROGRESS              STG 1a: 6 weeks:  The patient will demonstrate TUG score <18 seconds  and perform 7 STS in 30 seconds for reduced fall risk.                           STATUS:  STS goal MET but not TUG              TREATMENT:  Manual therapy, therapeutic exercise, home exercise instruction, and modalities as needed to include: moist heat, electrical stimulation, and ultrasound.        2. The patient has limited strength of the B shoulders and B hips.              LTG 2: 12 weeks:  The patient will demonstrate 4+ /5 strength for B shoulder flexion, abduction, external rotation, and internal rotation in order to demonstrate improved shoulder stability.                          STATUS:  MET              LTG 2a: 12 weeks:  The patient will demonstrate 4+/5 strength for B hip flexion, abduction, and extension in order to improve hip stability.                          STATUS:  IN PROGRESS              TREATMENT: Manual therapy, therapeutic exercise, home exercise instruction, and modalities as needed to include: electrical stimulation, ultrasound, moist heat, and ice.      3. The patient has difficulty with balance.               LTG 3: 12 weeks: The patient will hold the romberg position with eyes open for 20 seconds in order to improve balance and decrease risk of falling.                            STATUS: IN PROGRESS              [facility] 3: 6 weeks: The patient will hold the romberg position with eyes open for 10 seconds in order to improve balance and decrease risk of falling.                           STATUS: IN PROGRESS              TREATMENT:  Manual therapy, neuromuscular re-education, gait training, canalith repositioning maneuver, therapeutic exercise, home exercise instruction, and modalities as needed to include: moist heat, electrical stimulation, and ultrasound.        Progress toward previous goals: Partially Met    See Exercise, Manual, and Modality Logs for complete treatment.         Recommendations: Continue as planned  Timeframe: 2x a week for 6 weeks  Prognosis to achieve goals: good    PT SIGNATURE: Electronically signed by Tal Guerra, PT  [place] LICENSE: [id number]    Based upon review of the patient's progress and continued therapy plan, it is my medical opinion that Lj Crenshaw should continue physical therapy treatment at [facility] PHYSICAL THERAPY  [address] [place] [address]  [place] [address] [phone].      Timed:                 Manual Therapy:    0     mins  91154;     Therapeutic Exercise:    15     mins  65350;     Neuromuscular Reyna:    10    mins  51125;    Therapeutic Activity:     15     mins  91312;     Gait Trainin     mins  00700;     Ultrasound:     0     mins  98401;    Ionto                               0    mins   63099  Self pay                         0     mins [email or web address]    Un-Timed:  Electrical Stimulation:    0     mins  75364 ( )  Canalith Repos    0     mins 67845  Dry Needling     0     mins self-pay  Traction     0     mins 41554    Timed Treatment:   40   mins   Total Treatment:     40   mins      I certify that the therapy services are furnished while this patient is under my care.  The services outlined above are required by this patient, and will be reviewed every 90 days.

## 2024-05-28 ENCOUNTER — TREATMENT (OUTPATIENT)
Dept: PHYSICAL THERAPY | Facility: CLINIC | Age: 74
End: 2024-05-28
Payer: MEDICARE

## 2024-05-28 DIAGNOSIS — R53.1 GENERALIZED WEAKNESS: Primary | ICD-10-CM

## 2024-05-28 DIAGNOSIS — R26.89 BALANCE PROBLEM: ICD-10-CM

## 2024-05-28 DIAGNOSIS — R26.9 GAIT DISTURBANCE: ICD-10-CM

## 2024-05-28 PROCEDURE — 97112 NEUROMUSCULAR REEDUCATION: CPT | Performed by: PHYSICAL THERAPIST

## 2024-05-28 PROCEDURE — 97530 THERAPEUTIC ACTIVITIES: CPT | Performed by: PHYSICAL THERAPIST

## 2024-05-28 PROCEDURE — 97110 THERAPEUTIC EXERCISES: CPT | Performed by: PHYSICAL THERAPIST

## 2024-05-28 NOTE — PROGRESS NOTES
Outpatient Physical Therapy  [address] [facility], [place] [address]                            Physical Therapy Daily Treatment Note    Patient: Lj Crenshaw   : 1950  Diagnosis/ICD-10 Code:  Generalized weakness [R53.1]  Referring practitioner: Andrzej Cardona, *  Date of Initial Visit: Type: THERAPY  Noted: 3/27/2024  Today's Date: 2024  Patient seen for 17 sessions           Subjective   Lj Crenshaw reports: that his legs are feeling pretty good, not to sore after last PT session.     Objective   General fatigue.     See Exercise, Manual, and Modality Logs for complete treatment.     Assessment/Plan  Lj progressing as evident by decreased falls and increased tolerance of PT session. General fatigue by end of PT session Pt would benefit from skilled PT to address strength and endurance deficits, transfer and gait training and any concerns with ADLs.       Progress per Plan of Care         Timed:  Manual Therapy:        mins  24916;  Therapeutic Exercise:    15     mins  41020;     Neuromuscular Reyna:    10    mins  99681;    Therapeutic Activity:     15     mins  86882;     Gait Training:           mins  68196;          Timed Treatment:   40   mins   Total Treatment:     40   mins      Electronically signed:   Trudy Mckay PTA  Physical Therapist Assistant  [facility] License #: [id number]

## 2024-05-31 ENCOUNTER — TREATMENT (OUTPATIENT)
Dept: PHYSICAL THERAPY | Facility: CLINIC | Age: 74
End: 2024-05-31
Payer: MEDICARE

## 2024-05-31 DIAGNOSIS — R26.89 BALANCE PROBLEM: ICD-10-CM

## 2024-05-31 DIAGNOSIS — R53.1 GENERALIZED WEAKNESS: Primary | ICD-10-CM

## 2024-05-31 DIAGNOSIS — R26.9 GAIT DISTURBANCE: ICD-10-CM

## 2024-05-31 NOTE — PROGRESS NOTES
Outpatient Physical Therapy  [address] [facility], [place] [address]                            Physical Therapy Daily Treatment Note    Patient: Lj Crenshaw   : 1950  Diagnosis/ICD-10 Code:  Generalized weakness [R53.1]  Referring practitioner: Andrzej Cardona, *  Date of Initial Visit: Type: THERAPY  Noted: 3/27/2024  Today's Date: 2024  Patient seen for 18 sessions           Subjective   Lj Crenshaw reports: that his legs are feeling pretty good.     Objective   General fatigue.    See Exercise, Manual, and Modality Logs for complete treatment.     Assessment/Plan  Lj progressing as evident by decreased falls and increased tolerance of PT session. Pt would benefit from skilled PT to address strength and endurance deficits, transfer and gait training and any concerns with ADLs.       Progress per Plan of Care         Timed:  Manual Therapy:        mins  94150;  Therapeutic Exercise:    15     mins  64461;     Neuromuscular Reyna:    10    mins  60233;    Therapeutic Activity:     15     mins  61926;     Gait Training:           mins  27993;      Timed Treatment:   40   mins   Total Treatment:     40   mins      Electronically signed:   Trudy Mckay PTA  Physical Therapist Assistant  [facility] License #: [id number]

## 2024-06-05 ENCOUNTER — TREATMENT (OUTPATIENT)
Dept: PHYSICAL THERAPY | Facility: CLINIC | Age: 74
End: 2024-06-05
Payer: MEDICARE

## 2024-06-05 DIAGNOSIS — R26.9 GAIT DISTURBANCE: ICD-10-CM

## 2024-06-05 DIAGNOSIS — R53.1 GENERALIZED WEAKNESS: Primary | ICD-10-CM

## 2024-06-05 DIAGNOSIS — R26.89 BALANCE PROBLEM: ICD-10-CM

## 2024-06-05 NOTE — PROGRESS NOTES
Outpatient Physical Therapy  [address] [facility], [place] [address]                            Physical Therapy Daily Treatment Note    Patient: Lj Crenshaw   : 1950  Diagnosis/ICD-10 Code:  Generalized weakness [R53.1]  Referring practitioner: Andrzej Cardona, *  Date of Initial Visit: Type: THERAPY  Noted: 3/27/2024  Today's Date: 2024  Patient seen for 19 sessions           Subjective   Lj Crenshaw reports: that he has been using his ankle weights at home, states that he has a lift chair to get up at the house. He went to some ball games at [employer] and sometimes has issues sitting so low.    Objective   General fatigue by end of PT session.    See Exercise, Manual, and Modality Logs for complete treatment.     Assessment/Plan  Lj progressing as evident by decreased falls and increased tolerance of PT session. Pt required Stand by Assist for Safety throughout PT session, general fatigue. Pt would benefit from skilled PT to address strength and endurance deficits, transfer and gait training and any concerns with ADLs.       Progress per Plan of Care         Timed:  Manual Therapy:        mins  99346;  Therapeutic Exercise:    15     mins  26012;     Neuromuscular Reyna:    10    mins  16275;    Therapeutic Activity:     15     mins  00944;     Gait Training:           mins  73448;          Timed Treatment:   40   mins   Total Treatment:     40   mins      Electronically signed:   Trudy Mckay PTA  Physical Therapist Assistant  [facility] License #: [id number]

## 2024-06-07 ENCOUNTER — TREATMENT (OUTPATIENT)
Dept: PHYSICAL THERAPY | Facility: CLINIC | Age: 74
End: 2024-06-07
Payer: MEDICARE

## 2024-06-07 DIAGNOSIS — R26.9 GAIT DISTURBANCE: ICD-10-CM

## 2024-06-07 DIAGNOSIS — R53.1 GENERALIZED WEAKNESS: Primary | ICD-10-CM

## 2024-06-07 DIAGNOSIS — R26.89 BALANCE PROBLEM: ICD-10-CM

## 2024-06-07 NOTE — PROGRESS NOTES
Outpatient Physical Therapy  [address] [facility], [place] [address]                            Physical Therapy Daily Treatment Note    Patient: Lj Crenshaw   : 1950  Diagnosis/ICD-10 Code:  Generalized weakness [R53.1]  Referring practitioner: Andrzej Cardona, *  Date of Initial Visit: Type: THERAPY  Noted: 3/27/2024  Today's Date: 2024  Patient seen for 20 sessions           Subjective   Lj Crenshaw reports: that he has been using his taller chair at home, plans to take another one so he can have it for basketball games.    Objective   Minima discomfort in R shoulder with 10# ball toss, so exercise held.    See Exercise, Manual, and Modality Logs for complete treatment.     Assessment/Plan  Lj progressing as evident by increased tolerance of PT session, although R shoulder bothering him with 10# ball toss so exercise held Pt would benefit from skilled PT to address strength and endurance deficits, transfer and gait training and any concerns with ADLs.       Progress per Plan of Care         Timed:  Manual Therapy:        mins  14550;  Therapeutic Exercise:    15     mins  78601;     Neuromuscular Reyna:    10    mins  49426;    Therapeutic Activity:     15     mins  27245;     Gait Training:           mins  26810;      Timed Treatment:   40   mins   Total Treatment:     40   mins      Electronically signed:   Trudy Mckay PTA  Physical Therapist Assistant  [facility] [employer] License #: [id number]

## 2024-06-11 ENCOUNTER — TREATMENT (OUTPATIENT)
Dept: PHYSICAL THERAPY | Facility: CLINIC | Age: 74
End: 2024-06-11
Payer: MEDICARE

## 2024-06-11 DIAGNOSIS — R26.9 GAIT DISTURBANCE: ICD-10-CM

## 2024-06-11 DIAGNOSIS — R53.1 GENERALIZED WEAKNESS: Primary | ICD-10-CM

## 2024-06-11 DIAGNOSIS — R26.89 BALANCE PROBLEM: ICD-10-CM

## 2024-06-11 NOTE — PROGRESS NOTES
Outpatient Physical Therapy  [address] [facility], [place] [address]                            Physical Therapy Daily Treatment Note    Patient: Lj Crenshaw   : 1950  Diagnosis/ICD-10 Code:  Generalized weakness [R53.1]  Referring practitioner: Andrzej Cardona, *  Date of Initial Visit: Type: THERAPY  Noted: 3/27/2024  Today's Date: 2024  Patient seen for 21 sessions           Subjective   Lj Crenshaw reports: that his legs are feeling pretty good, states that he [place] a [place]. [place] that he mainly sits at the table for dinner and then sits in his chair [place]     Objective   General fatigue by end of PT session.     See Exercise, Manual, and Modality Logs for complete treatment.     Assessment/Plan  Lj progressing as evident by decreased falls and increased tolerance of PT session; general fatigue. Pt would benefit from skilled PT to address strength and endurance deficits, transfer and gait training and any concerns with ADLs.       Progress per Plan of Care         Timed:  Manual Therapy:        mins  93826;  Therapeutic Exercise:    15     mins  36056;     Neuromuscular Reyna:    10    mins  65955;    Therapeutic Activity:     15     mins  28489;     Gait Training:           mins  43798;      Timed Treatment:   40   mins   Total Treatment:     40   mins      Electronically signed:   Trudy Mckay PTA  Physical Therapist Assistant  [place] [employer] License #: [id number]

## 2024-06-13 ENCOUNTER — TREATMENT (OUTPATIENT)
Dept: PHYSICAL THERAPY | Facility: CLINIC | Age: 74
End: 2024-06-13
Payer: MEDICARE

## 2024-06-13 DIAGNOSIS — R26.9 GAIT DISTURBANCE: ICD-10-CM

## 2024-06-13 DIAGNOSIS — R53.1 GENERALIZED WEAKNESS: Primary | ICD-10-CM

## 2024-06-13 DIAGNOSIS — R26.89 BALANCE PROBLEM: ICD-10-CM

## 2024-06-13 NOTE — PROGRESS NOTES
Outpatient Physical Therapy  [address] [facility], [place] [address]                            Physical Therapy Daily Treatment Note    Patient: Lj Crenshaw   : 1950  Diagnosis/ICD-10 Code:  Generalized weakness [R53.1]  Referring practitioner: Andrzej Cardona, *  Date of Initial Visit: Type: THERAPY  Noted: 3/27/2024  Today's Date: 2024  Patient seen for 22 sessions           Subjective   Lj Crenshaw reports: that his legs are feeling pretty good, states that he wasn't necessarily sore after last PT session. No falls in the last few months.  Reports that shoulders have been feeling good.     Objective   General fatigue    See Exercise, Manual, and Modality Logs for complete treatment.     Assessment/Plan  Lj progressing as evident by decreased falls and increased tolerance of PT session. Pt required Stand by Assist for Safety throughout PT session, general fatigue. Pt would benefit from skilled PT to address strength and endurance deficits, transfer and gait training and any concerns with ADLs.       Progress per Plan of Care         Timed:  Manual Therapy:        mins  37336;  Therapeutic Exercise:    15     mins  92806;     Neuromuscular Reyna:    10    mins  22109;    Therapeutic Activity:     15     mins  14284;     Gait Training:           mins  33059;      Timed Treatment:   40   mins   Total Treatment:     40   mins      Electronically signed:   Trudy Mckay PTA  Physical Therapist Assistant  [facility] License #: [id number]

## 2024-06-18 ENCOUNTER — TREATMENT (OUTPATIENT)
Dept: PHYSICAL THERAPY | Facility: CLINIC | Age: 74
End: 2024-06-18
Payer: MEDICARE

## 2024-06-18 DIAGNOSIS — R26.9 GAIT DISTURBANCE: ICD-10-CM

## 2024-06-18 DIAGNOSIS — R26.89 BALANCE PROBLEM: ICD-10-CM

## 2024-06-18 DIAGNOSIS — R53.1 GENERALIZED WEAKNESS: Primary | ICD-10-CM

## 2024-06-18 NOTE — PROGRESS NOTES
Re-Assessment / Re-Certification  [address] [place] [id number]      Patient: Lj Crenshaw   : 1950  Diagnosis/ICD-10 Code:  Generalized weakness [R53.1]  Referring practitioner: Andrzej Cardona, *  Date of Initial Visit: Type: THERAPY  Noted: 3/27/2024  Today's Date: 2024  Patient seen for 23 sessions      Subjective:   Lj Crenshaw reports: 010  Clinical Progress: improved  Home Program Compliance: Yes  Treatment has included: therapeutic exercise, neuromuscular re-education, manual therapy, therapeutic activity, and gait training    Subjective     Pt reports overall he feels like therapy is really helping and he feels like he is getting stronger.    Objective     30 second STS: 8 times with use of UE at 23.5 inch table    TU.4 seconds with rollator with airex in chair (average of 3 trials)     6 minute walk test: 658 feet with AD      Strength/Myotome Testing      Left Shoulder      Planes of Motion   Flexion: 5   Abduction: 5   External rotation at 0°: 5   Internal rotation at 0°: 5      Isolated Muscles   Biceps: 5   Triceps: 5      Right Shoulder      Planes of Motion   Flexion: 5  Abduction: 5  External rotation at 0°: 5  Internal rotation at 0°: 5     Isolated Muscles   Biceps: 5  Triceps: 5     Left Hip   Planes of Motion   Flexion: 3-  Abduction: 3+  Adduction: 2     Right Hip   Planes of Motion   Flexion: 3-  Abduction: 3+  Adduction: 2     Left Knee   Flexion: 4  Extension: 4     Right Knee   Flexion: 3+  Extension: 4-     Left Ankle/Foot   Dorsiflexion: 0     Right Ankle/Foot   Dorsiflexion: 0     Comments  Feet together EO: 30 seconds  Tandem stance: unable today due to fatigue    Assessment/Plan    Pt has made functional progress with improving STS reps and decreasing time with TUG. Pt has also made improvement to endurance as demonstrated by being able to perform 6 minute walk test today for the first time. Pt was very fatigued at end of test and  therefore had increase in difficulty performing balance testing. Pt does still require the use of rollator for ambulation and has forward flexed posture. Pt requires skilled PT in order to address deficits listed to return patient back to maximum function to include being able to walk in neighborhood and go to grandchildren's games. Will continue to progress patient as able.    Goals  Plan Goals:      1. Mobility: Walking/Moving Around Functional Limitation                               LTG 1: 12 weeks:  The patient will demonstrate TUG score <16 seconds and perform 9 STS in 30 seconds for reduced fall risk.                           STATUS:  IN PROGRESS              STG 1a: 6 weeks:  The patient will demonstrate TUG score <18 seconds  and perform 7 STS in 30 seconds for reduced fall risk.                           STATUS:  STS goal MET but not TUG              TREATMENT:  Manual therapy, therapeutic exercise, home exercise instruction, and modalities as needed to include: moist heat, electrical stimulation, and ultrasound.        2. The patient has limited strength of the B shoulders and B hips.              LTG 2: 12 weeks:  The patient will demonstrate 4+ /5 strength for B shoulder flexion, abduction, external rotation, and internal rotation in order to demonstrate improved shoulder stability.                          STATUS:  MET              LTG 2a: 12 weeks:  The patient will demonstrate 4+/5 strength for B hip flexion, abduction, and extension in order to improve hip stability.                          STATUS:  IN PROGRESS              TREATMENT: Manual therapy, therapeutic exercise, home exercise instruction, and modalities as needed to include: electrical stimulation, ultrasound, moist heat, and ice.      3. The patient has difficulty with balance.               LTG 3: 12 weeks: The patient will hold the romberg position with eyes open for 20 seconds in order to improve balance and decrease risk of falling.                            STATUS: IN PROGRESS              STG 3: 6 weeks: The patient will hold the romberg position with eyes open for 10 seconds in order to improve balance and decrease risk of falling.                           STATUS: IN PROGRESS              TREATMENT:  Manual therapy, neuromuscular re-education, gait training, canalith repositioning maneuver, therapeutic exercise, home exercise instruction, and modalities as needed to include: moist heat, electrical stimulation, and ultrasound.      4. The patient has difficulty with endurance.     LTG 4: 12 weeks: The patient will be able to ambulate 750 with rollator in 6 minute walk test to decrease his risk for falling in community outings.    STATUS: IN PROGRESS   TREATMENT:  Manual therapy, neuromuscular re-education, gait training, canalith repositioning maneuver, therapeutic exercise, home exercise instruction, and modalities as needed to include: moist heat, electrical stimulation, and ultrasound.      Progress toward previous goals: Partially Met    See Exercise, Manual, and Modality Logs for complete treatment.         Recommendations: Continue as planned  Timeframe:2x a week for 6 weeks  Prognosis to achieve goals: good    PT Signature: Electronically signed by Tal Guerra, PT  [place] LICENSE: [id number]      Based upon review of the patient's progress and continued therapy plan, it is my medical opinion that Lj Crenshaw should continue physical therapy treatment at [facility]  [address] [facility]  [place] [address] [phone].    Signature: __________________________________  Andrzej Cardona MD    Timed:           Timed:         Manual Therapy:    0     mins  36684;     Therapeutic Exercise:    10     mins  85696;     Neuromuscular Reyna:    10    mins  54876;    Therapeutic Activity:     20     mins  94861;     Gait Trainin     mins  51247;     Ultrasound:     0     mins   40482;    Ionto                               0    mins   52314  Self pay                         0     mins [id number]    Un-Timed:  Electrical Stimulation:    0     mins  44039 ( )  Canalith Repos    0     mins 99541  Dry Needling     0     mins self-pay  Traction     0     mins 40844  Re-Eval                           0    mins  56778    Timed Treatment:   40   mins   Total Treatment:     40   mins    PT SIGNATURE: Electronically signed by Tal Guerra, PT  [place] LICENSE: [id number]     DATE TREATMENT INITIATED: 6/18/2024    90 Day Recertification  Certification Period: 6/18/2024 thru 9/15/2024  I certify that the therapy services are furnished while this patient is under my care.  The services outlined above are required by this patient, and will be reviewed every 90 days.     PHYSICIAN: Andrzej Cardona MD   NPI: [id number]      DATE:     Please sign and return via fax to [phone] Thank you, [facility] Physical Therapy.

## 2024-06-19 ENCOUNTER — HOSPITAL ENCOUNTER (OUTPATIENT)
Dept: ULTRASOUND IMAGING | Facility: HOSPITAL | Age: 74
Discharge: HOME | End: 2024-06-19
Admitting: STUDENT IN AN ORGANIZED HEALTH CARE EDUCATION/TRAINING PROGRAM
Payer: MEDICARE

## 2024-06-19 DIAGNOSIS — I71.9 AORTIC ANEURYSM WITHOUT RUPTURE, UNSPECIFIED PORTION OF AORTA: ICD-10-CM

## 2024-06-19 PROCEDURE — 76775 US EXAM ABDO BACK WALL LIM: CPT

## 2024-06-20 ENCOUNTER — TREATMENT (OUTPATIENT)
Dept: PHYSICAL THERAPY | Facility: CLINIC | Age: 74
End: 2024-06-20
Payer: MEDICARE

## 2024-06-20 DIAGNOSIS — R26.9 GAIT DISTURBANCE: ICD-10-CM

## 2024-06-20 DIAGNOSIS — R26.89 BALANCE PROBLEM: ICD-10-CM

## 2024-06-20 DIAGNOSIS — R53.1 GENERALIZED WEAKNESS: Primary | ICD-10-CM

## 2024-06-20 NOTE — PROGRESS NOTES
Outpatient Physical Therapy  [address] [facility], [place] [address]                            Physical Therapy Daily Treatment Note    Patient: Lj Crenshaw   : 1950  Diagnosis/ICD-10 Code:  Generalized weakness [R53.1]  Referring practitioner: Andrzej Cardona, *  Date of Initial Visit: Type: THERAPY  Noted: 3/27/2024  Today's Date: 2024  Patient seen for 24 sessions           Subjective   Lj Crenshaw reports: that his legs fell a little shaky today. States that his low back is also bothering him a little bit.     Objective   General fatigue.    See Exercise, Manual, and Modality Logs for complete treatment.     Assessment/Plan  Lj progressing as evident by decreased falls and increased tolerance of PT session. Pt required Stand by Assist for Safety throughout PT session, although low back bothering him a little more. Pt would benefit from skilled PT to address strength and endurance deficits, transfer and gait training and any concerns with ADLs.       Progress per Plan of Care         Timed:  Manual Therapy:        mins  15543;  Therapeutic Exercise:    15     mins  79087;     Neuromuscular Reyna:    10    mins  11684;    Therapeutic Activity:     15     mins  68826;     Gait Training:           mins  64476;      Timed Treatment:   40   mins   Total Treatment:     40   mins      Electronically signed:   Trudy Mckay PTA  Physical Therapist Assistant  [facility] License #: [id number]

## 2024-06-25 ENCOUNTER — TREATMENT (OUTPATIENT)
Dept: PHYSICAL THERAPY | Facility: CLINIC | Age: 74
End: 2024-06-25
Payer: MEDICARE

## 2024-06-25 DIAGNOSIS — R53.1 GENERALIZED WEAKNESS: Primary | ICD-10-CM

## 2024-06-25 DIAGNOSIS — R26.9 GAIT DISTURBANCE: ICD-10-CM

## 2024-06-25 DIAGNOSIS — R26.89 BALANCE PROBLEM: ICD-10-CM

## 2024-06-25 NOTE — PROGRESS NOTES
Outpatient Physical Therapy  [address] [facility], [place] [address]                            Physical Therapy Daily Treatment Note    Patient: Lj Crenshaw   : 1950  Diagnosis/ICD-10 Code:  Generalized weakness [R53.1]  Referring practitioner: Andrzej Cardona, *  Date of Initial Visit: Type: THERAPY  Noted: 3/27/2024  Today's Date: 2024  Patient seen for 25 sessions           Subjective   Lj [place]shaw [place] but sit. States that he has a bump/cut on his knee that might be from [place].     Objective   General fatigue with ambulating with weights on LE.    See Exercise, Manual, and Modality Logs for complete treatment.     Assessment/Plan  Lj progressing as evident by decreased falls and increased tolerance of PT session. Pt required sitting rest breaks after standing exercises, general fatigue. Pt would benefit from skilled PT to address strength and endurance deficits, transfer and gait training and any concerns with ADLs.       Progress per Plan of Care         Timed:  Manual Therapy:        mins  66873;  Therapeutic Exercise:    15     mins  54836;     Neuromuscular Reyna:    10    mins  95111;    Therapeutic Activity:     15     mins  32148;     Gait Training:           mins  19548;          Timed Treatment:   40   mins   Total Treatment:     40   mins      Electronically signed:   Trudy Mckay PTA  Physical Therapist Assistant  \[facility]\"" License #: [id number]

## 2024-06-27 ENCOUNTER — TREATMENT (OUTPATIENT)
Dept: PHYSICAL THERAPY | Facility: CLINIC | Age: 74
End: 2024-06-27
Payer: MEDICARE

## 2024-06-27 DIAGNOSIS — R26.89 BALANCE PROBLEM: ICD-10-CM

## 2024-06-27 DIAGNOSIS — R53.1 GENERALIZED WEAKNESS: Primary | ICD-10-CM

## 2024-06-27 DIAGNOSIS — R26.9 GAIT DISTURBANCE: ICD-10-CM

## 2024-06-27 NOTE — PROGRESS NOTES
Outpatient Physical Therapy  [address] [facility], [place] [address]                            Physical Therapy Daily Treatment Note    Patient: Lj Crenshaw   : 1950  Diagnosis/ICD-10 Code:  Generalized weakness [R53.1]  Referring practitioner: Andrzej Cardona, *  Date of Initial Visit: Type: THERAPY  Noted: 3/27/2024  Today's Date: 2024  Patient seen for 26 sessions           Subjective   Lj Crenshaw reports: that overall his legs feel pretty good, states no falls.     Objective   General fatigue by end of PT session.    See Exercise, Manual, and Modality Logs for complete treatment.     Assessment/Plan  Lj progressing as evident by decreased falls and increased tolerance of PT session. Pt required sitting rest breaks throughout PT session. Pt would benefit from skilled PT to address strength and endurance deficits, transfer and gait training and any concerns with ADLs.     Progress per Plan of Care         Timed:  Manual Therapy:        mins  40735;  Therapeutic Exercise:    15     mins  12902;     Neuromuscular Reyna:    10    mins  76122;    Therapeutic Activity:     15     mins  57949;     Gait Training:           mins  00120;      Timed Treatment:   40   mins   Total Treatment:     40   mins      Electronically signed:   Trudy Mckay PTA  Physical Therapist Assistant  [facility] [employer] License #: [id number]

## 2024-07-01 ENCOUNTER — OFFICE VISIT (OUTPATIENT)
Dept: FAMILY MEDICINE CLINIC | Facility: CLINIC | Age: 74
End: 2024-07-01
Payer: MEDICARE

## 2024-07-01 VITALS
WEIGHT: 272 LBS | TEMPERATURE: 98.1 F | BODY MASS INDEX: 31.47 KG/M2 | SYSTOLIC BLOOD PRESSURE: 123 MMHG | OXYGEN SATURATION: 95 % | HEIGHT: 78 IN | HEART RATE: 80 BPM | DIASTOLIC BLOOD PRESSURE: 66 MMHG

## 2024-07-01 DIAGNOSIS — I10 HYPERTENSION, UNSPECIFIED TYPE: Primary | ICD-10-CM

## 2024-07-01 DIAGNOSIS — Z91.81 AT HIGH RISK FOR INJURY RELATED TO FALL: ICD-10-CM

## 2024-07-01 DIAGNOSIS — Z12.5 PROSTATE CANCER SCREENING: ICD-10-CM

## 2024-07-01 DIAGNOSIS — I10 ESSENTIAL HYPERTENSION: ICD-10-CM

## 2024-07-01 DIAGNOSIS — E78.2 MIXED HYPERLIPIDEMIA: ICD-10-CM

## 2024-07-01 DIAGNOSIS — R29.898 MUSCULAR DECONDITIONING: ICD-10-CM

## 2024-07-01 DIAGNOSIS — R26.89 BALANCE DISORDER: ICD-10-CM

## 2024-07-01 DIAGNOSIS — Z12.11 COLON CANCER SCREENING: ICD-10-CM

## 2024-07-01 LAB
ALBUMIN SERPL-MCNC: 4.1 G/DL (ref 3.5–5.2)
ALBUMIN/GLOB SERPL: 1.5 G/DL
ALP SERPL-CCNC: 66 U/L (ref 39–117)
ALT SERPL W P-5'-P-CCNC: 20 U/L (ref 1–41)
ANION GAP SERPL CALCULATED.3IONS-SCNC: 9 MMOL/L (ref 5–15)
AST SERPL-CCNC: 22 U/L (ref 1–40)
BASOPHILS # BLD AUTO: 0.05 10*3/MM3 (ref 0–0.2)
BASOPHILS NFR BLD AUTO: 0.7 % (ref 0–1.5)
BILIRUB SERPL-MCNC: 0.4 MG/DL (ref 0–1.2)
BUN SERPL-MCNC: 19 MG/DL (ref 8–23)
BUN/CREAT SERPL: 31.1 (ref 7–25)
CALCIUM SPEC-SCNC: 10.2 MG/DL (ref 8.6–10.5)
CHLORIDE SERPL-SCNC: 104 MMOL/L (ref 98–107)
CHOLEST SERPL-MCNC: 131 MG/DL (ref 0–200)
CO2 SERPL-SCNC: 28 MMOL/L (ref 22–29)
CREAT SERPL-MCNC: 0.61 MG/DL (ref 0.76–1.27)
DEPRECATED RDW RBC AUTO: 42.6 FL (ref 37–54)
EGFRCR SERPLBLD CKD-EPI 2021: 102.1 ML/MIN/1.73
EOSINOPHIL # BLD AUTO: 0.11 10*3/MM3 (ref 0–0.4)
EOSINOPHIL NFR BLD AUTO: 1.5 % (ref 0.3–6.2)
ERYTHROCYTE [DISTWIDTH] IN BLOOD BY AUTOMATED COUNT: 13.3 % (ref 12.3–15.4)
GLOBULIN UR ELPH-MCNC: 2.7 GM/DL
GLUCOSE SERPL-MCNC: 118 MG/DL (ref 65–99)
HCT VFR BLD AUTO: 45.9 % (ref 37.5–51)
HDLC SERPL-MCNC: 53 MG/DL (ref 40–60)
HGB BLD-MCNC: 15 G/DL (ref 13–17.7)
IMM GRANULOCYTES # BLD AUTO: 0.03 10*3/MM3 (ref 0–0.05)
IMM GRANULOCYTES NFR BLD AUTO: 0.4 % (ref 0–0.5)
LDLC SERPL CALC-MCNC: 63 MG/DL (ref 0–100)
LDLC/HDLC SERPL: 1.19 {RATIO}
LYMPHOCYTES # BLD AUTO: 1.42 10*3/MM3 (ref 0.7–3.1)
LYMPHOCYTES NFR BLD AUTO: 20 % (ref 19.6–45.3)
MCH RBC QN AUTO: 28.9 PG (ref 26.6–33)
MCHC RBC AUTO-ENTMCNC: 32.7 G/DL (ref 31.5–35.7)
MCV RBC AUTO: 88.4 FL (ref 79–97)
MONOCYTES # BLD AUTO: 0.59 10*3/MM3 (ref 0.1–0.9)
MONOCYTES NFR BLD AUTO: 8.3 % (ref 5–12)
NEUTROPHILS NFR BLD AUTO: 4.91 10*3/MM3 (ref 1.7–7)
NEUTROPHILS NFR BLD AUTO: 69.1 % (ref 42.7–76)
NRBC BLD AUTO-RTO: 0 /100 WBC (ref 0–0.2)
PLATELET # BLD AUTO: 211 10*3/MM3 (ref 140–450)
PMV BLD AUTO: 9.6 FL (ref 6–12)
POTASSIUM SERPL-SCNC: 4.7 MMOL/L (ref 3.5–5.2)
PROT SERPL-MCNC: 6.8 G/DL (ref 6–8.5)
PSA SERPL-MCNC: 3.57 NG/ML (ref 0–4)
RBC # BLD AUTO: 5.19 10*6/MM3 (ref 4.14–5.8)
SODIUM SERPL-SCNC: 141 MMOL/L (ref 136–145)
TRIGL SERPL-MCNC: 74 MG/DL (ref 0–150)
VLDLC SERPL-MCNC: 15 MG/DL (ref 5–40)
WBC NRBC COR # BLD AUTO: 7.11 10*3/MM3 (ref 3.4–10.8)

## 2024-07-01 PROCEDURE — 80053 COMPREHEN METABOLIC PANEL: CPT | Performed by: STUDENT IN AN ORGANIZED HEALTH CARE EDUCATION/TRAINING PROGRAM

## 2024-07-01 PROCEDURE — 99214 OFFICE O/P EST MOD 30 MIN: CPT | Performed by: STUDENT IN AN ORGANIZED HEALTH CARE EDUCATION/TRAINING PROGRAM

## 2024-07-01 PROCEDURE — 1170F FXNL STATUS ASSESSED: CPT | Performed by: STUDENT IN AN ORGANIZED HEALTH CARE EDUCATION/TRAINING PROGRAM

## 2024-07-01 PROCEDURE — 80061 LIPID PANEL: CPT | Performed by: STUDENT IN AN ORGANIZED HEALTH CARE EDUCATION/TRAINING PROGRAM

## 2024-07-01 PROCEDURE — 3078F DIAST BP <80 MM HG: CPT | Performed by: STUDENT IN AN ORGANIZED HEALTH CARE EDUCATION/TRAINING PROGRAM

## 2024-07-01 PROCEDURE — 85025 COMPLETE CBC W/AUTO DIFF WBC: CPT | Performed by: STUDENT IN AN ORGANIZED HEALTH CARE EDUCATION/TRAINING PROGRAM

## 2024-07-01 PROCEDURE — G0103 PSA SCREENING: HCPCS | Performed by: STUDENT IN AN ORGANIZED HEALTH CARE EDUCATION/TRAINING PROGRAM

## 2024-07-01 PROCEDURE — 3074F SYST BP LT 130 MM HG: CPT | Performed by: STUDENT IN AN ORGANIZED HEALTH CARE EDUCATION/TRAINING PROGRAM

## 2024-07-01 PROCEDURE — 36415 COLL VENOUS BLD VENIPUNCTURE: CPT | Performed by: STUDENT IN AN ORGANIZED HEALTH CARE EDUCATION/TRAINING PROGRAM

## 2024-07-01 RX ORDER — LISINOPRIL AND HYDROCHLOROTHIAZIDE 12.5; 1 MG/1; MG/1
1 TABLET ORAL DAILY
Qty: 90 TABLET | Refills: 3 | Status: SHIPPED | OUTPATIENT
Start: 2024-07-01

## 2024-07-01 RX ORDER — ROSUVASTATIN CALCIUM 40 MG/1
40 TABLET, COATED ORAL DAILY
Qty: 90 TABLET | Refills: 3 | Status: SHIPPED | OUTPATIENT
Start: 2024-07-01

## 2024-07-01 NOTE — ASSESSMENT & PLAN NOTE
Hypertension is stable and controlled  Continue current treatment regimen.  Blood pressure will be reassessed in 6 months  .

## 2024-07-01 NOTE — PROGRESS NOTES
"Chief Complaint  HTN    Subjective      Lj Crenshaw is a 74 y.o. male who presents to [facility]     73 yo male right-handed  male with past medical history significant for hypertension, osteoarthritis and sarcoidosis     Patient with high risk to fall. With physical therapy he has been maintaining his strength. Pt will benefit from extended physical  therapy. Order placed. Also, pt might benefit from scooter to get transported from places to places.     Objective   Vital Signs:   Vitals:    07/01/24 0747   BP: 123/66   Pulse: 80   Temp: 98.1 °F (36.7 °C)   TempSrc: Temporal   SpO2: 95%   Weight: 123 kg (272 lb)   Height: 198.1 cm (77.99\")     Body mass index is 31.44 kg/m².    Wt Readings from Last 3 Encounters:   07/01/24 123 kg (272 lb)   05/06/24 124 kg (274 lb)   12/18/23 124 kg (274 lb)     BP Readings from Last 3 Encounters:   07/01/24 123/66   05/06/24 125/75   12/18/23 120/62       Health Maintenance   Topic Date Due    COVID-19 Vaccine (3 - 2023-24 season) 09/01/2023    COLORECTAL CANCER SCREENING  10/28/2023    RSV Vaccine - Adults (1 - 1-dose 60+ series) 07/01/2025 (Originally 3/15/2012)    INFLUENZA VACCINE  08/01/2024    LIPID PANEL  12/11/2024    ANNUAL WELLNESS VISIT  05/06/2025    BMI FOLLOWUP  07/01/2025    TDAP/TD VACCINES (2 - Td or Tdap) 09/18/2033    HEPATITIS C SCREENING  Completed    Pneumococcal Vaccine 65+  Completed    AAA SCREEN (ONE-TIME)  Completed    ZOSTER VACCINE  Completed       Physical Exam  Vitals reviewed.   Constitutional:       Comments: Balance problems. Use a walker.    HENT:      Head: Normocephalic.      Mouth/Throat:      Mouth: Mucous membranes are moist.   Eyes:      Pupils: Pupils are equal, round, and reactive to light.   Cardiovascular:      Rate and Rhythm: Normal rate.   Abdominal:      General: Abdomen is flat.   Musculoskeletal:         General: Normal range of motion.      Cervical back: Normal range of motion. "   Skin:     General: Skin is warm.      Capillary Refill: Capillary refill takes less than 2 seconds.   Neurological:      Mental Status: He is alert.            Assessment & Plan  Hypertension, unspecified type  Hypertension is stable and controlled  Continue current treatment regimen.  Blood pressure will be reassessed in 6 months.  Colon cancer screening  Cologuard pending   Balance disorder    Muscular deconditioning  Physical therapy   At high risk for injury related to fall  Physical therapy   scooter  Prostate cancer screening    Mixed hyperlipidemia   Lipid abnormalities are stable    Plan:  Continue same medication/s without change.      Discussed medication dosage, use, side effects, and goals of treatment in detail.    Counseled patient on lifestyle modifications to help control hyperlipidemia.     Patient Treatment Goals:   LDL goal is less than 70    Followup in 6 months.  Essential hypertension  Hypertension is stable and controlled  Continue current treatment regimen.  Blood pressure will be reassessed in 6 months  .    Orders Placed This Encounter   Procedures    Motorized Scooter    Lipid Panel    Comprehensive Metabolic Panel    PSA SCREENING    Ambulatory Referral to Physical Therapy    CBC & Differential     New Medications Ordered This Visit   Medications    lisinopril-hydrochlorothiazide (PRINZIDE,ZESTORETIC) 10-12.5 MG per tablet     Sig: Take 1 tablet by mouth Daily.     Dispense:  90 tablet     Refill:  3    rosuvastatin (Crestor) 40 MG tablet     Sig: Take 1 tablet by mouth Daily. generic     Dispense:  90 tablet     Refill:  3                  I spent 30 minutes caring for Lj on this date of service. This time includes time spent by me in the following activities:preparing for the visit, reviewing tests, obtaining and/or reviewing a separately obtained history, performing a medically appropriate examination and/or evaluation , counseling and educating the patient/family/caregiver,  ordering medications, tests, or procedures, referring and communicating with other health care professionals , documenting information in the medical record, independently interpreting results and communicating that information with the patient/family/caregiver, and care coordination  FOLLOW UP  No follow-ups on file.  Patient was given instructions and counseling regarding his condition or for health maintenance advice. Please see specific information pulled into the AVS if appropriate.       Andrzej Ledesma MD  07/01/24  08:36 EDT    CURRENT & DISCONTINUED MEDICATIONS  Current Outpatient Medications   Medication Instructions    Cyanocobalamin 1000 MCG sublingual tablet 1 tablet, Sublingual, Daily    ketorolac (ACULAR) 0.5 % ophthalmic solution No dose, route, or frequency recorded.    lisinopril-hydrochlorothiazide (PRINZIDE,ZESTORETIC) 10-12.5 MG per tablet 1 tablet, Oral, Daily    prednisoLONE acetate (PRED FORTE) 1 % ophthalmic suspension 1 drop, 4 Times Daily    rosuvastatin (CRESTOR) 40 mg, Oral, Daily, generic       Medications Discontinued During This Encounter   Medication Reason    rosuvastatin (Crestor) 40 MG tablet Reorder    lisinopril-hydrochlorothiazide (PRINZIDE,ZESTORETIC) 10-12.5 MG per tablet Reorder

## 2024-07-09 ENCOUNTER — TREATMENT (OUTPATIENT)
Dept: PHYSICAL THERAPY | Facility: CLINIC | Age: 74
End: 2024-07-09
Payer: MEDICARE

## 2024-07-09 DIAGNOSIS — R26.89 BALANCE PROBLEM: ICD-10-CM

## 2024-07-09 DIAGNOSIS — R26.9 GAIT DISTURBANCE: ICD-10-CM

## 2024-07-09 DIAGNOSIS — R53.1 GENERALIZED WEAKNESS: Primary | ICD-10-CM

## 2024-07-09 NOTE — PROGRESS NOTES
Outpatient Physical Therapy  [address] [facility], [place] [address]                            Physical Therapy Daily Treatment Note    Patient: Lj Crenshaw   : 1950  Diagnosis/ICD-10 Code:  Generalized weakness [R53.1]  Referring practitioner: Andrzej Cardona, *  Date of Initial Visit: Type: THERAPY  Noted: 3/27/2024  Today's Date: 2024  Patient seen for 27 sessions           Subjective   Lj Crenshaw reports: that he forgot to wear his brace today, states that he thinks he walks better without.     Objective   General fatigue.    See Exercise, Manual, and Modality Logs for complete treatment.     Assessment/Plan  Lj progressing as evident by decreased falls and increased tolerance of PT session. Pt required Stand by Assist for Safety throughout PT session, just general fatigue. Pt would benefit from skilled PT to address strength and endurance deficits, transfer and gait training and any concerns with ADLs.       Progress per Plan of Care         Timed:  Manual Therapy:        mins  24802;  Therapeutic Exercise:    15     mins  08033;     Neuromuscular Reyna:    10    mins  12858;    Therapeutic Activity:     15     mins  60411;     Gait Training:           mins  79071;      Timed Treatment:   40   mins   Total Treatment:     40   mins      Electronically signed:   Trudy cMkay PTA  Physical Therapist Assistant  [facility] License #: [id number]

## 2024-07-11 ENCOUNTER — TREATMENT (OUTPATIENT)
Dept: PHYSICAL THERAPY | Facility: CLINIC | Age: 74
End: 2024-07-11
Payer: MEDICARE

## 2024-07-11 DIAGNOSIS — R26.9 GAIT DISTURBANCE: ICD-10-CM

## 2024-07-11 DIAGNOSIS — R26.89 BALANCE PROBLEM: ICD-10-CM

## 2024-07-11 DIAGNOSIS — R53.1 GENERALIZED WEAKNESS: Primary | ICD-10-CM

## 2024-07-11 NOTE — PROGRESS NOTES
Outpatient Physical Therapy  [address] [facility]note 2], [place] [address]                            Physical Therapy Daily Treatment Note    Patient: Lj Crenshaw   : 1950  Diagnosis/ICD-10 Code:  Generalized weakness [R53.1]  Referring practitioner: Andrzej Cardona, *  Date of Initial Visit: Type: THERAPY  Noted: 3/27/2024  Today's Date: 2024  Patient seen for 28 sessions           Subjective   Lj Crenshaw reports: that he isn't wearing his AFO again today because he feels like he was able to ride the bike last time better without it.     Objective   General fatigue, although able to walk farther this PT session.    See Exercise, Manual, and Modality Logs for complete treatment.     Assessment/Plan  Lj progressing as evident by decreased falls and increased tolerance of PT session. Pt required  sitting rest breaks  throughout PT session. Pt would benefit from skilled PT to address strength and endurance deficits, transfer and gait training and any concerns with ADLs.       Progress per Plan of Care         Timed:  Manual Therapy:        mins  43691;  Therapeutic Exercise:    15     mins  89569;     Neuromuscular Reyna:    10    mins  03537;    Therapeutic Activity:     15     mins  65159;     Gait Training:           mins  09172;          Timed Treatment:   40   mins   Total Treatment:     40   mins      Electronically signed:   Trudy Mckay PTA  Physical Therapist Assistant  [facility] [employer] License #: [id number]

## 2024-07-15 ENCOUNTER — TREATMENT (OUTPATIENT)
Dept: PHYSICAL THERAPY | Facility: CLINIC | Age: 74
End: 2024-07-15
Payer: MEDICARE

## 2024-07-15 DIAGNOSIS — R26.89 BALANCE PROBLEM: ICD-10-CM

## 2024-07-15 DIAGNOSIS — R53.1 GENERALIZED WEAKNESS: Primary | ICD-10-CM

## 2024-07-15 DIAGNOSIS — R26.9 GAIT DISTURBANCE: ICD-10-CM

## 2024-07-15 PROCEDURE — 97530 THERAPEUTIC ACTIVITIES: CPT | Performed by: PHYSICAL THERAPIST

## 2024-07-15 PROCEDURE — 97112 NEUROMUSCULAR REEDUCATION: CPT | Performed by: PHYSICAL THERAPIST

## 2024-07-15 PROCEDURE — 97110 THERAPEUTIC EXERCISES: CPT | Performed by: PHYSICAL THERAPIST

## 2024-07-15 NOTE — PROGRESS NOTES
Outpatient Physical Therapy  [address] [facility], [place] [address]                            Physical Therapy Daily Treatment Note    Patient: Lj Crenshaw   : 1950  Diagnosis/ICD-10 Code:  Generalized weakness [R53.1]  Referring practitioner: Andrzej Cardona, *  Date of Initial Visit: Type: THERAPY  Noted: 3/27/2024  Today's Date: 7/15/2024  Patient seen for 29 sessions           Subjective   Lj Crenshaw reports: that he tries to stand up at [employer] and just doesn't get up the same because the seat is so much lower.     Objective   General faituge.     See Exercise, Manual, and Modality Logs for complete treatment.     Assessment/Plan  Lj progressing as evident by decreased falls and increased tolerance of PT session. Pt required  sitting rest breaks  throughout PT session, general fatigue. Pt would benefit from skilled PT to address strength and endurance deficits, transfer and gait training and any concerns with ADLs.       Progress per Plan of Care         Timed:  Manual Therapy:        mins  27053;  Therapeutic Exercise:    15     mins  18833;     Neuromuscular Reyna:    10    mins  72961;    Therapeutic Activity:     15     mins  04588;     Gait Training:           mins  99233;          Timed Treatment:   40   mins   Total Treatment:     40   mins      Electronically signed:   Trudy Mckay PTA  Physical Therapist Assistant  [facility] License #: [id number]

## 2024-07-17 ENCOUNTER — TREATMENT (OUTPATIENT)
Dept: PHYSICAL THERAPY | Facility: CLINIC | Age: 74
End: 2024-07-17
Payer: MEDICARE

## 2024-07-17 DIAGNOSIS — R26.89 BALANCE PROBLEM: ICD-10-CM

## 2024-07-17 DIAGNOSIS — R53.1 GENERALIZED WEAKNESS: Primary | ICD-10-CM

## 2024-07-17 DIAGNOSIS — R26.9 GAIT DISTURBANCE: ICD-10-CM

## 2024-07-17 NOTE — PROGRESS NOTES
Outpatient Physical Therapy  [address] [facility], [place] [address]                            Physical Therapy Daily Treatment Note    Patient: Lj Crenshaw   : 1950  Diagnosis/ICD-10 Code:  Generalized weakness [R53.1]  Referring practitioner: Andrzej Cardona, *  Date of Initial Visit: Type: THERAPY  Noted: 3/27/2024  Today's Date: 2024  Patient seen for 30 sessions           Subjective   Lj Crenshaw reports: legs are feeling good, no falls. States that he uses his blue band at home for shoulder strengthening    Objective   General fatigue after gait training and standing exercises.    See Exercise, Manual, and Modality Logs for complete treatment.     Assessment/Plan  Lj progressing as evident by decreased falls and increased tolerance of PT session. Pt required Stand by Assist for Safety throughout PT session, just general fatigue. Pt would benefit from skilled PT to address strength and endurance deficits, transfer and gait training and any concerns with ADLs.       Progress per Plan of Care         Timed:  Manual Therapy:        mins  20669;  Therapeutic Exercise:    15     mins  84864;     Neuromuscular Reyna:    10    mins  47707;    Therapeutic Activity:     15     mins  90513;     Gait Training:           mins  91392;      Timed Treatment:   40   mins   Total Treatment:     40   mins      Electronically signed:   Trudy Mckay PTA  Physical Therapist Assistant  [facility] License #: [id number]

## 2024-07-23 ENCOUNTER — TREATMENT (OUTPATIENT)
Dept: PHYSICAL THERAPY | Facility: CLINIC | Age: 74
End: 2024-07-23
Payer: MEDICARE

## 2024-07-23 DIAGNOSIS — R29.898 LEG WEAKNESS, BILATERAL: ICD-10-CM

## 2024-07-23 DIAGNOSIS — M25.611 DECREASED ROM OF RIGHT SHOULDER: ICD-10-CM

## 2024-07-23 DIAGNOSIS — R53.1 GENERALIZED WEAKNESS: Primary | ICD-10-CM

## 2024-07-23 DIAGNOSIS — R26.9 GAIT DISTURBANCE: ICD-10-CM

## 2024-07-23 DIAGNOSIS — R26.89 BALANCE PROBLEM: ICD-10-CM

## 2024-07-23 DIAGNOSIS — R26.89 BALANCE DISORDER: Primary | ICD-10-CM

## 2024-07-23 NOTE — PROGRESS NOTES
Progress Assessment  [address] [place] [id number]        Patient: Lj Crenshaw   : 1950  Diagnosis/ICD-10 Code:  Generalized weakness [R53.1]  Referring practitioner: Andrzej Cardona, *  Date of Initial Visit: Type: THERAPY  Noted: 3/27/2024  Today's Date: 2024  Patient seen for 31 sessions      Subjective:   Lj Crenshaw reports: 0/10  Clinical Progress: unchanged  Home Program Compliance: Yes  Treatment has included: therapeutic exercise, neuromuscular re-education, manual therapy, therapeutic activity, and gait training    Subjective     Pt reports he fell last Thursday in his house. Pt reports he had to crawl over to a chair to push up in order to stand. Pt presents with bilateral open wounds on both LE's with bandaids on R LE. Pt reports he does feel weaker today secondary to fall.     Objective     30 second STS: 3.5 times with use of UE at 23.5 inch table - R knee buckled with STS limiting test     TU seconds with rollator with airex in chair (average of 3 trials)    Strength/Myotome Testing      Left Shoulder      Planes of Motion   Flexion: 5   Abduction: 5   External rotation at 0°: 5   Internal rotation at 0°: 5      Isolated Muscles   Biceps: 5   Triceps: 5      Right Shoulder      Planes of Motion   Flexion: 5  Abduction: 5  External rotation at 0°: 5  Internal rotation at 0°: 5     Isolated Muscles   Biceps: 5  Triceps: 5     Left Hip   Planes of Motion   Flexion: 3-  Abduction: 3+  Adduction: 2     Right Hip   Planes of Motion   Flexion: 3-  Abduction: 3+  Adduction: 2     Left Knee   Flexion: 4  Extension: 4     Right Knee   Flexion: 3+  Extension: 4-     Left Ankle/Foot   Dorsiflexion: 0     Right Ankle/Foot   Dorsiflexion: 0    Feet together EO: 6 seconds  Tandem stance: unable today due to fatigue    Assessment/Plan    Pt has had a decrease in both TUG and STS test since last progress note. Pt was limited today due to fatigue and R knee buckling. Pt  has also maintained strength testing compared to last progress note with no increase in strength. Pt reports he is having a harder time walking around with walker. Discussed with patient about PT reaching out to PCP to discuss progress. Will continue with PT in order to address deficits listed in order to return patient back to maximum function such as increasing endurance in order to attend grandchildren's events. Will continue to progress patient as able.     Goals  Plan Goals:      1. Mobility: Walking/Moving Around Functional Limitation                               LTG 1: 12 weeks:  The patient will demonstrate TUG score <16 seconds and perform 9 STS in 30 seconds for reduced fall risk.                           STATUS:  IN PROGRESS              STG 1a: 6 weeks:  The patient will demonstrate TUG score <18 seconds  and perform 7 STS in 30 seconds for reduced fall risk.                           STATUS:  IN PROGRESS              TREATMENT:  Manual therapy, therapeutic exercise, home exercise instruction, and modalities as needed to include: moist heat, electrical stimulation, and ultrasound.        2. The patient has limited strength of the B shoulders and B hips.              LTG 2: 12 weeks:  The patient will demonstrate 4+ /5 strength for B shoulder flexion, abduction, external rotation, and internal rotation in order to demonstrate improved shoulder stability.                          STATUS:  MET              LTG 2a: 12 weeks:  The patient will demonstrate 4+/5 strength for B hip flexion, abduction, and extension in order to improve hip stability.                          STATUS:  IN PROGRESS              TREATMENT: Manual therapy, therapeutic exercise, home exercise instruction, and modalities as needed to include: electrical stimulation, ultrasound, moist heat, and ice.      3. The patient has difficulty with balance.               LTG 3: 12 weeks: The patient will hold the romberg position with eyes open  for 20 seconds in order to improve balance and decrease risk of falling.                           STATUS: IN PROGRESS              STG 3: 6 weeks: The patient will hold the romberg position with eyes open for 10 seconds in order to improve balance and decrease risk of falling.                           STATUS: IN PROGRESS              TREATMENT:  Manual therapy, neuromuscular re-education, gait training, canalith repositioning maneuver, therapeutic exercise, home exercise instruction, and modalities as needed to include: moist heat, electrical stimulation, and ultrasound.       4. The patient has difficulty with endurance.               LTG 4: 12 weeks: The patient will be able to ambulate 750 with rollator in 6 minute walk test to decrease his risk for falling in community outings.               STATUS: IN PROGRESS              TREATMENT:  Manual therapy, neuromuscular re-education, gait training, canalith repositioning maneuver, therapeutic exercise, home exercise instruction, and modalities as needed to include: moist heat, electrical stimulation, and ultrasound.      Progress toward previous goals: Partially Met    See Exercise, Manual, and Modality Logs for complete treatment.         Recommendations: Continue as planned  Timeframe:2x a week for 6 weeks  Prognosis to achieve goals: good    PT SIGNATURE: Electronically signed by Tal Guerra, PT  [place] LICENSE: [id number]    Based upon review of the patient's progress and continued therapy plan, it is my medical opinion that Lj Crenshaw should continue physical therapy treatment at [facility] PHYSICAL THERAPY  [address] [facility] [place] [address] [phone].      Timed:                 Manual Therapy:    0     mins  08976;     Therapeutic Exercise:    10     mins  52010;     Neuromuscular Reyna:    10    mins  44501;    Therapeutic Activity:     20     mins  23065;     Gait Trainin     mins  54892;     Ultrasound:      0     mins  49569;    Ionto                               0    mins   65704  Self pay                         0     mins [id number]    Un-Timed:  Electrical Stimulation:    0     mins  34689 ( )  Canalith Repos    0     mins 50865  Dry Needling     0     mins self-pay  Traction     0     mins 40627    Timed Treatment:   40   mins   Total Treatment:     40   mins      I certify that the therapy services are furnished while this patient is under my care.  The services outlined above are required by this patient, and will be reviewed every 90 days.

## 2024-07-23 NOTE — PROGRESS NOTES
"  Physical Therapy Initial Evaluation and Plan of Care      [place] [facility]: [address] [place] [address]      Patient: Lj Crenshaw   : 1950  Diagnosis/ICD-10 Code:  Balance disorder [R26.89]  Referring practitioner: Andrzej Cardona, *  Date of Initial Visit: 2024  Today's Date: 2024  Patient seen for 1 sessions           Subjective   Pt reports they struggle w/ their LE strength, though they do have decent UE strength. Pt does rely on items to push up from due to the weakness in their LEs. Pt did have a fall, as recent as last week. Pt reports they were getting into the bathroom and their R LE buckled. Pt has decreased sensation in their R LE, which they attribute from knee replacement. Pt reports since the knee replacement, they have had drop foot and the decreased sensation. \"I don't feel safe unless I am grabbing onto something. Pt does have some limitation in their R shoulder ROM, which they attribute to a fall in the past as well. Pt also notes they have decreased strength in their L hand, decreasing their ability to perform gripping tasks, which they rely on to not fall.    In the past pt has struggled w/ navigating stairs due to their LE weakness as well. Pt does have 3 stairs to enter the house which pt uses B UEs and AD to help navigate these. Pt relies on their UEs heavily to perform daily tasks w/n the home, including cooking and dressing.       Pt does have a lift chair now when they are at home.     Past Medical Hx: R knee replacement, drop foot, R shoulder pain and limited ROM.       Objective          Neurological Testing     Additional Neurological Details  Decreased sensation to light touch consistent w/ L4 on the R.     Active Range of Motion   Left Shoulder   Flexion: 150 degrees     Right Shoulder   Flexion: 135 degrees with pain    Additional Active Range of Motion Details  R shoulder, pain at the top of the motion.     Strength/Myotome Testing "     Left Shoulder     Planes of Motion   Flexion: 5   Abduction: 5   External rotation at 0°: 5   Internal rotation at 0°: 5     Right Shoulder     Planes of Motion   Flexion: 5   Abduction: 5   External rotation at 0°: 4+   Internal rotation at 0°: 5     Left Elbow   Flexion: 5  Extension: 5    Right Elbow   Flexion: 5  Extension: 4+    Left Hip   Planes of Motion   Flexion: 3    Right Hip   Planes of Motion   Flexion: 3    Left Knee   Flexion: 4  Extension: 4    Right Knee   Flexion: 4  Extension: 4-    Left Ankle/Foot   Dorsiflexion: 1    Right Ankle/Foot   Dorsiflexion: 0    Ambulation     Comments   Pt uses rollator walker for ambulation, relying heavily on their UEs. Pt has R drop foot. Pt has a brace on their R knee.     Functional Assessment     Comments  TU.71; standard chair     30 sec STS: 4, standard chair, B UEs to stand, has to use rollator walker to stand erect.       See Exercise, Manual, and Modality Logs for complete treatment.       Assessment & Plan       Assessment  Impairments: abnormal coordination, abnormal gait, abnormal or restricted ROM, activity intolerance, impaired balance and impaired physical strength   Functional limitations: carrying objects, lifting, walking, sitting, standing, reaching overhead and unable to perform repetitive tasks   Assessment details: Today, Lj Crenshaw was evaluated for a power wheelchair. Due to R drop foot, R LE paresthesias, decreased R shoulder ROM, and R shoulder pain, this patient has a mobility limitation that significantly impairs her ability to participate in one or more mobility related activities of daily living (MRADLs) in the home including cooking, toileting, and safe household ambulation. The patient's mobility limitation cannot be sufficiently and safely resolved by the use of an appropriately fitted cane or walker due to LE weakness of_4-_ /5 with pain in their R shoulder. Pt relies heavily on the use of their B UEs to ambulate w/n  the home. Pt has experienced falls while utilizing their walker, due to times of needing to maneuver around their home. Pt does have decreased sensation w/n their R LE, as well as dropfoot, which increases their overall risk of falls. Although the patient does have decent strength in their UEs, also reports falls where their UE support has been unreliable. Pt also has limited ROM in their R shoulder as well as pain. Use of a manual wheelchair would likely exacerbate their levels of pain in their R UE as well as decrease their overall ability to perform transfers safely. The patient's home is too small for the turning radius of a scooter, and due to his stature, the scooter would likely become more top heavy increasing risk of falls while utilizing this. The use of a power wheelchair, such as the [employer], will significantly improve the patient's ability to participate in MRADLs in the home such as cooking, cleaning, and independent mobiltiy. The patient has the mental and physical capabilities to safely operate the power wheelchair. The patient is willing and capable of using a power wheelchair in the home. The patient can safely transfer to and from the power wheelchair.          PLAN:  Therapy options: no therapy services needed  Treatment plan discussed with: patient      Visit Diagnoses:    ICD-10-CM ICD-9-CM   1. Balance disorder  R26.89 781.99   2. Leg weakness, bilateral  R29.898 729.89   3. Decreased ROM of right shoulder  M25.611 719.51   4. Gait disturbance  R26.9 781.2       History # of Personal Factors and/or Comorbidities: MODERATE (1-2)  Examination of Body System(s): # of elements: LOW (1-2)  Clinical Presentation: STABLE   Clinical Decision Making: LOW       Timed:         Manual Therapy:    0     mins  22140;     Therapeutic Exercise:    0     mins  49725;     Neuromuscular Reyna:    0    mins  81151;    Therapeutic Activity:     0     mins  60108;     Gait Trainin     mins  71114;      Ultrasound:     0     mins  53881;    Ionto                               0    mins   00225  Self Care                       0     mins   74744  Canalith Repos    0     mins 16591      Un-Timed:  Electrical Stimulation:    0     mins  41837 ( );  Dry Needling     0     mins self-pay  Traction     0     mins 36254  Low Eval     45     Mins  39761  Mod Eval     0     Mins  00561  High Eval                       0     Mins  57097  Re-Eval                           0    mins  61952    Timed Treatment:   0   mins   Total Treatment:     45   mins    PT SIGNATURE: Andrade Hines PT, DPT    Electronically signed 7/23/2024    [place] License: PT - [id number]    Initial Certification  Certification Period: 7/23/2024 thru 10/20/2024  I certify that the therapy services are furnished while this patient is under my care.  The services outlined above are required by this patient, and will be reviewed every 90 days.     PHYSICIAN: Andrzej Cardona  NPI: [id number]       DATE:     Please sign and return via fax to [phone]. Thank you, [facility].

## 2024-07-25 ENCOUNTER — TREATMENT (OUTPATIENT)
Dept: PHYSICAL THERAPY | Facility: CLINIC | Age: 74
End: 2024-07-25
Payer: MEDICARE

## 2024-07-25 DIAGNOSIS — R53.1 GENERALIZED WEAKNESS: ICD-10-CM

## 2024-07-25 DIAGNOSIS — R26.89 BALANCE PROBLEM: ICD-10-CM

## 2024-07-25 DIAGNOSIS — R29.898 LEG WEAKNESS, BILATERAL: ICD-10-CM

## 2024-07-25 DIAGNOSIS — R26.9 GAIT DISTURBANCE: ICD-10-CM

## 2024-07-25 DIAGNOSIS — M25.611 DECREASED ROM OF RIGHT SHOULDER: ICD-10-CM

## 2024-07-25 DIAGNOSIS — R26.89 BALANCE DISORDER: Primary | ICD-10-CM

## 2024-07-25 NOTE — PROGRESS NOTES
Outpatient Physical Therapy  [address] [facility], [place] [address]                            Physical Therapy Daily Treatment Note    Patient: Lj Crenshaw   : 1950  Diagnosis/ICD-10 Code:  Balance disorder [R26.89]  Referring practitioner: Andrzej Cardona, *  Date of Initial Visit: Type: THERAPY  Noted: 3/27/2024  Today's Date: 2024  Patient seen for 32 sessions           Subjective   Lj Crenshaw reports: that he is more tired today and isn't sure why. States that he has been more cautious since his fall the other day. He has been looking at wheelchairs to buy himself vs using insurance, concerned with how they would transport the wheelchair with their car.     Objective   General fatigue, even UE more fatigued than usual.    See Exercise, Manual, and Modality Logs for complete treatment.     Assessment/Plan  Lj progressing as evident by decreased falls and increased tolerance of PT session, although more fatigued than normal. Pt would benefit from skilled PT to address strength and endurance deficits, transfer and gait training and any concerns with ADLs.     Progress per Plan of Care       Timed:  Manual Therapy:        mins  35024;  Therapeutic Exercise:    15     mins  51231;     Neuromuscular Reyna:    10    mins  58087;    Therapeutic Activity:     15     mins  15017;     Gait Training:           mins  30865;      Timed Treatment:   40   mins   Total Treatment:     40   mins      Electronically signed:   Trudy Mckay PTA  Physical Therapist Assistant  [facility] License #: [id number]

## 2024-07-30 ENCOUNTER — TREATMENT (OUTPATIENT)
Dept: PHYSICAL THERAPY | Facility: CLINIC | Age: 74
End: 2024-07-30
Payer: MEDICARE

## 2024-07-30 DIAGNOSIS — R26.89 BALANCE DISORDER: Primary | ICD-10-CM

## 2024-07-30 DIAGNOSIS — R53.1 GENERALIZED WEAKNESS: ICD-10-CM

## 2024-07-30 DIAGNOSIS — R26.9 GAIT DISTURBANCE: ICD-10-CM

## 2024-07-30 DIAGNOSIS — R26.89 BALANCE PROBLEM: ICD-10-CM

## 2024-07-30 DIAGNOSIS — M25.611 DECREASED ROM OF RIGHT SHOULDER: ICD-10-CM

## 2024-07-30 DIAGNOSIS — R29.898 LEG WEAKNESS, BILATERAL: ICD-10-CM

## 2024-07-30 PROCEDURE — 97110 THERAPEUTIC EXERCISES: CPT | Performed by: PHYSICAL THERAPIST

## 2024-07-30 PROCEDURE — 97112 NEUROMUSCULAR REEDUCATION: CPT | Performed by: PHYSICAL THERAPIST

## 2024-07-30 PROCEDURE — 97530 THERAPEUTIC ACTIVITIES: CPT | Performed by: PHYSICAL THERAPIST

## 2024-07-30 NOTE — PROGRESS NOTES
Outpatient Physical Therapy  [address] [facility], [place] [address]                            Physical Therapy Daily Treatment Note    Patient: Lj Crenshaw   : 1950  Diagnosis/ICD-10 Code:  Balance disorder [R26.89]  Referring practitioner: Andrzej Cardona, *  Date of Initial Visit: Type: THERAPY  Noted: 3/27/2024  Today's Date: 2024  Patient seen for 33 sessions           Subjective   Lj Crenshaw reports: that he is more tired today for some reason. He did go up to a family reunion on Saturday in [place] but his wife did all the driving.     Objective   General fatigue following standing exercises.    See Exercise, Manual, and Modality Logs for complete treatment.     Assessment/Plan  Lj progressing as evident by decreased falls and increased tolerance of PT session. Pt required seated rest breaks throughout PT session, general fatigue. Pt would benefit from skilled PT to address strength and endurance deficits, transfer and gait training and any concerns with ADLs.       Progress per Plan of Care         Timed:  Manual Therapy:        mins  80103;  Therapeutic Exercise:    15     mins  43679;     Neuromuscular Reyna:    10    mins  10046;    Therapeutic Activity:     15     mins  56562;     Gait Training:           mins  62723;      Timed Treatment:   40   mins   Total Treatment:     40   mins      Electronically signed:   Trudy Mckay PTA  Physical Therapist Assistant  [facility] License #: [id number]

## 2024-08-01 ENCOUNTER — TREATMENT (OUTPATIENT)
Dept: PHYSICAL THERAPY | Facility: CLINIC | Age: 74
End: 2024-08-01
Payer: MEDICARE

## 2024-08-01 DIAGNOSIS — R29.898 LEG WEAKNESS, BILATERAL: ICD-10-CM

## 2024-08-01 DIAGNOSIS — R26.89 BALANCE PROBLEM: ICD-10-CM

## 2024-08-01 DIAGNOSIS — M25.611 DECREASED ROM OF RIGHT SHOULDER: ICD-10-CM

## 2024-08-01 DIAGNOSIS — R53.1 GENERALIZED WEAKNESS: ICD-10-CM

## 2024-08-01 DIAGNOSIS — R26.89 BALANCE DISORDER: Primary | ICD-10-CM

## 2024-08-01 DIAGNOSIS — R26.9 GAIT DISTURBANCE: ICD-10-CM

## 2024-08-01 NOTE — PROGRESS NOTES
Outpatient Physical Therapy  [address] [facility], [place] [address]                            Physical Therapy Daily Treatment Note    Patient: Lj Crenshaw   : 1950  Diagnosis/ICD-10 Code:  Balance disorder [R26.89]  Referring practitioner: Andrzej Cardona, *  Date of Initial Visit: Type: THERAPY  Noted: 3/27/2024  Today's Date: 2024  Patient seen for 34 sessions           Subjective   Lj Crenshaw reports: that his right shoulder was really bothering him this morning for some reason. He hasn't gotten a phone call in regards to the Neurologist or Lumbar MRI.     Objective   General fatigue.    See Exercise, Manual, and Modality Logs for complete treatment.     Assessment/Plan  Lj progressing as evident by decreased falls and increased tolerance of PT session. Pt required  seated rest breaks  throughout PT session, especially after ambulating around the clinic. Pt would benefit from skilled PT to address strength and endurance deficits, transfer and gait training and any concerns with ADLs.       Progress per Plan of Care         Timed:  Manual Therapy:        mins  45034;  Therapeutic Exercise:    15     mins  20343;     Neuromuscular Reyna:    10    mins  89335;    Therapeutic Activity:     15     mins  60649;     Gait Training:           mins  81630;      Timed Treatment:   40   mins   Total Treatment:     40   mins      Electronically signed:   Trudy Mckay PTA  Physical Therapist Assistant  [facility] License #: [id number]

## 2024-08-06 ENCOUNTER — TREATMENT (OUTPATIENT)
Dept: PHYSICAL THERAPY | Facility: CLINIC | Age: 74
End: 2024-08-06
Payer: MEDICARE

## 2024-08-06 DIAGNOSIS — R29.898 LEG WEAKNESS, BILATERAL: ICD-10-CM

## 2024-08-06 DIAGNOSIS — M25.611 DECREASED ROM OF RIGHT SHOULDER: ICD-10-CM

## 2024-08-06 DIAGNOSIS — R26.9 GAIT DISTURBANCE: ICD-10-CM

## 2024-08-06 DIAGNOSIS — R53.1 GENERALIZED WEAKNESS: ICD-10-CM

## 2024-08-06 DIAGNOSIS — R26.89 BALANCE PROBLEM: ICD-10-CM

## 2024-08-06 DIAGNOSIS — R26.89 BALANCE DISORDER: Primary | ICD-10-CM

## 2024-08-06 NOTE — PROGRESS NOTES
Outpatient Physical Therapy  [address] [facility], [place] [address]                            Physical Therapy Daily Treatment Note    Patient: Lj Crenshaw   : 1950  Diagnosis/ICD-10 Code:  Balance disorder [R26.89]  Referring practitioner: Andrzej Cardona, *  Date of Initial Visit: Type: THERAPY  Noted: 3/27/2024  Today's Date: 2024  Patient seen for 35 sessions           Subjective   Lj Crenshaw reports: that he isn't wearing his brace today, states that he feels like he can ride the bike better without the brace on. His MRI is scheduled for beginning of August, hasn't had an appt set up for Neurology yet.    Objective   General fatigue with walking.    See Exercise, Manual, and Modality Logs for complete treatment.     Assessment/Plan  Lj progressing as evident by decreased falls and increased tolerance of PT session. Pt required Stand by Assist for Safety throughout PT session, general fatigue. Pt would benefit from skilled PT to address strength and endurance deficits, transfer and gait training and any concerns with ADLs.       Progress per Plan of Care         Timed:  Manual Therapy:        mins  67502;  Therapeutic Exercise:    15     mins  69577;     Neuromuscular Reyna:    10    mins  28127;    Therapeutic Activity:     15     mins  52701;     Gait Training:           mins  74160;        Timed Treatment:   40   mins   Total Treatment:     40   mins      Electronically signed:   Trudy Mckay PTA  Physical Therapist Assistant  [facility] License #: [id number]

## 2024-08-08 ENCOUNTER — TREATMENT (OUTPATIENT)
Dept: PHYSICAL THERAPY | Facility: CLINIC | Age: 74
End: 2024-08-08
Payer: MEDICARE

## 2024-08-08 DIAGNOSIS — R29.898 LEG WEAKNESS, BILATERAL: ICD-10-CM

## 2024-08-08 DIAGNOSIS — R26.9 GAIT DISTURBANCE: ICD-10-CM

## 2024-08-08 DIAGNOSIS — R26.89 BALANCE PROBLEM: ICD-10-CM

## 2024-08-08 DIAGNOSIS — R26.89 BALANCE DISORDER: Primary | ICD-10-CM

## 2024-08-08 DIAGNOSIS — R53.1 GENERALIZED WEAKNESS: ICD-10-CM

## 2024-08-08 DIAGNOSIS — M25.611 DECREASED ROM OF RIGHT SHOULDER: ICD-10-CM

## 2024-08-08 NOTE — PROGRESS NOTES
Outpatient Physical Therapy  [address] [facility], [place] [address]                            Physical Therapy Daily Treatment Note    Patient: Lj Crenshaw   : 1950  Diagnosis/ICD-10 Code:  Balance disorder [R26.89]  Referring practitioner: Andrzej Cardona, *  Date of Initial Visit: Type: THERAPY  Noted: 3/27/2024  Today's Date: 2024  Patient seen for 36 sessions           Subjective   Lj Crenshaw reports: that he is moving a little slower today and isn't sure why.     Objective   General fatigue    See Exercise, Manual, and Modality Logs for complete treatment.     Assessment/Plan  Lj progressing as evident by decreased falls and increased tolerance of PT session. Pt would benefit from skilled PT to address strength and endurance deficits, transfer and gait training and any concerns with ADLs.     Progress per Plan of Care         Timed:  Manual Therapy:         mins  36263;  Therapeutic Exercise:    15     mins  74072;     Neuromuscular Reyna:    10    mins  82959;    Therapeutic Activity:     15     mins  08588;     Gait Training:           mins  73954;         Timed Treatment:   40   mins   Total Treatment:     40   mins      Electronically signed:   Trudy Mckay PTA  Physical Therapist Assistant  [facility] License #: [id number]  [employer] Certified: [id number]

## 2024-08-16 ENCOUNTER — TREATMENT (OUTPATIENT)
Dept: PHYSICAL THERAPY | Facility: CLINIC | Age: 74
End: 2024-08-16
Payer: MEDICARE

## 2024-08-16 DIAGNOSIS — R26.89 BALANCE DISORDER: Primary | ICD-10-CM

## 2024-08-16 DIAGNOSIS — R26.9 GAIT DISTURBANCE: ICD-10-CM

## 2024-08-16 DIAGNOSIS — R53.1 GENERALIZED WEAKNESS: ICD-10-CM

## 2024-08-16 DIAGNOSIS — R29.898 LEG WEAKNESS, BILATERAL: ICD-10-CM

## 2024-08-16 DIAGNOSIS — R26.89 BALANCE PROBLEM: ICD-10-CM

## 2024-08-16 DIAGNOSIS — M25.611 DECREASED ROM OF RIGHT SHOULDER: ICD-10-CM

## 2024-08-16 NOTE — PROGRESS NOTES
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 States that he is usually stiff when he sits a long time.     Objective   General fatigue.    See Exercise, Manual, and Modality Logs for complete treatment.     Assessment/Plan  Lj progressing as evident by decreased falls and increased tolerance of PT session. Pt required Stand by Assist for Safety throughout PT session, general fatigue. Pt would benefit from skilled PT to address strength and endurance deficits, transfer and gait training and any concerns with ADLs.       Progress per Plan of Care         Timed:  Manual Therapy:        mins  52025;  Therapeutic Exercise:    15     mins  93983;     Neuromuscular Reyna:    10    mins  34544;    Therapeutic Activity:     15     mins  92627;     Gait Training:           mins  02054;        Timed Treatment:   40   mins   Total Treatment:     40   mins      Electronically signed:   Trudy Mckay PTA  Physical Therapist Assistant  [facility] License #: [id number]

## 2024-08-22 ENCOUNTER — TREATMENT (OUTPATIENT)
Dept: PHYSICAL THERAPY | Facility: CLINIC | Age: 74
End: 2024-08-22
Payer: MEDICARE

## 2024-08-22 DIAGNOSIS — R29.898 LEG WEAKNESS, BILATERAL: ICD-10-CM

## 2024-08-22 DIAGNOSIS — M25.611 DECREASED ROM OF RIGHT SHOULDER: ICD-10-CM

## 2024-08-22 DIAGNOSIS — R53.1 GENERALIZED WEAKNESS: ICD-10-CM

## 2024-08-22 DIAGNOSIS — R26.89 BALANCE PROBLEM: ICD-10-CM

## 2024-08-22 DIAGNOSIS — R26.89 BALANCE DISORDER: Primary | ICD-10-CM

## 2024-08-22 DIAGNOSIS — R26.9 GAIT DISTURBANCE: ICD-10-CM

## 2024-08-22 NOTE — PROGRESS NOTES
Outpatient Physical Therapy  [address] [facility], [place] [address]                            Physical Therapy Daily Treatment Note    Patient: Lj Crenshaw   : 1950  Diagnosis/ICD-10 Code:  Balance disorder [R26.89]  Referring practitioner: Andrzej Cardona, *  Date of Initial Visit: Type: THERAPY  Noted: 3/27/2024  Today's Date: 2024  Patient seen for 38 sessions           Subjective   Lj Crenshaw reports: that his legs have been feeling pretty good. States that he still hasn't heard anything in regards to a Neurology appt.     Objective   Increased fatigue in RLE with standing exercises.    See Exercise, Manual, and Modality Logs for complete treatment.     Assessment/Plan  Lj progressing as evident by decreased falls and increased tolerance of PT session. Pt required Stand by Assist for Safety throughout PT session, general fatigue. Pt would benefit from skilled PT to address strength and endurance deficits, transfer and gait training and any concerns with ADLs.       Progress per Plan of Care         Timed:  Manual Therapy:        mins  96118;  Therapeutic Exercise:    15     mins  18126;     Neuromuscular Reyna:    10    mins  83446;    Therapeutic Activity:     15     mins  99875;     Gait Training:           mins  55684;        Timed Treatment:   40   mins   Total Treatment:     40   mins      Electronically signed:   Trudy Mckay PTA  Physical Therapist Assistant  [facility] License #: [id number]

## 2024-08-29 ENCOUNTER — TREATMENT (OUTPATIENT)
Dept: PHYSICAL THERAPY | Facility: CLINIC | Age: 74
End: 2024-08-29
Payer: MEDICARE

## 2024-08-29 DIAGNOSIS — M25.611 DECREASED ROM OF RIGHT SHOULDER: ICD-10-CM

## 2024-08-29 DIAGNOSIS — R26.9 GAIT DISTURBANCE: ICD-10-CM

## 2024-08-29 DIAGNOSIS — R26.89 BALANCE DISORDER: Primary | ICD-10-CM

## 2024-08-29 DIAGNOSIS — R26.89 BALANCE PROBLEM: ICD-10-CM

## 2024-08-29 DIAGNOSIS — R53.1 GENERALIZED WEAKNESS: ICD-10-CM

## 2024-08-29 DIAGNOSIS — R29.898 LEG WEAKNESS, BILATERAL: ICD-10-CM

## 2024-09-06 ENCOUNTER — OFFICE VISIT (OUTPATIENT)
Dept: FAMILY MEDICINE CLINIC | Facility: CLINIC | Age: 74
End: 2024-09-06
Payer: MEDICARE

## 2024-09-06 VITALS
HEART RATE: 96 BPM | TEMPERATURE: 97.5 F | SYSTOLIC BLOOD PRESSURE: 129 MMHG | HEIGHT: 78 IN | OXYGEN SATURATION: 95 % | DIASTOLIC BLOOD PRESSURE: 71 MMHG | WEIGHT: 274.4 LBS | BODY MASS INDEX: 31.75 KG/M2

## 2024-09-06 DIAGNOSIS — J98.8 RESPIRATORY INFECTION: ICD-10-CM

## 2024-09-06 DIAGNOSIS — R05.3 CHRONIC COUGH: Primary | ICD-10-CM

## 2024-09-06 PROCEDURE — 99214 OFFICE O/P EST MOD 30 MIN: CPT | Performed by: STUDENT IN AN ORGANIZED HEALTH CARE EDUCATION/TRAINING PROGRAM

## 2024-09-06 PROCEDURE — 3074F SYST BP LT 130 MM HG: CPT | Performed by: STUDENT IN AN ORGANIZED HEALTH CARE EDUCATION/TRAINING PROGRAM

## 2024-09-06 PROCEDURE — 3078F DIAST BP <80 MM HG: CPT | Performed by: STUDENT IN AN ORGANIZED HEALTH CARE EDUCATION/TRAINING PROGRAM

## 2024-09-06 RX ORDER — AZITHROMYCIN 250 MG/1
TABLET, FILM COATED ORAL
Qty: 6 TABLET | Refills: 0 | Status: SHIPPED | OUTPATIENT
Start: 2024-09-06

## 2024-09-06 NOTE — PROGRESS NOTES
"Chief Complaint  Cough (Been about 2 weeks its getting better but keeps them up at night )    Subjective      Lj Crenshaw is a 74 y.o. male who presents to [facility]         \patient comes with cc of cough. Started 2 weeks ago. Likely contact with someone that was sick. Now pt is improving. Educated regarding superimposed bacterial pneumonia. Pt understand when to go to the ED.     Objective   Vital Signs:   Vitals:    09/06/24 0737   BP: 129/71   Pulse: 96   Temp: 97.5 °F (36.4 °C)   TempSrc: Temporal   SpO2: 95%   Weight: 124 kg (274 lb 6.4 oz)   Height: 198.1 cm (77.99\")     Body mass index is 31.72 kg/m².    Wt Readings from Last 3 Encounters:   09/06/24 124 kg (274 lb 6.4 oz)   07/01/24 123 kg (272 lb)   05/06/24 124 kg (274 lb)     BP Readings from Last 3 Encounters:   09/06/24 129/71   07/01/24 123/66   05/06/24 125/75       Health Maintenance   Topic Date Due    INFLUENZA VACCINE  08/01/2024    COVID-19 Vaccine (3 - 2023-24 season) 11/26/2024 (Originally 9/1/2024)    ANNUAL WELLNESS VISIT  05/06/2025    LIPID PANEL  07/01/2025    BMI FOLLOWUP  07/01/2025    COLORECTAL CANCER SCREENING  07/11/2027    TDAP/TD VACCINES (2 - Td or Tdap) 09/18/2033    HEPATITIS C SCREENING  Completed    Pneumococcal Vaccine 65+  Completed    AAA SCREEN (ONE-TIME)  Completed    ZOSTER VACCINE  Completed       Physical Exam  Vitals reviewed.   HENT:      Head: Normocephalic.      Mouth/Throat:      Mouth: Mucous membranes are moist.   Eyes:      Pupils: Pupils are equal, round, and reactive to light.   Cardiovascular:      Rate and Rhythm: Normal rate.   Abdominal:      General: Abdomen is flat.   Musculoskeletal:         General: Normal range of motion.      Cervical back: Normal range of motion.   Skin:     General: Skin is warm.      Capillary Refill: Capillary refill takes less than 2 seconds.   Neurological:      Mental Status: He is alert.            Assessment & Plan  Chronic " cough  Azithromycin   Respiratory infection       New Medications Ordered This Visit   Medications    azithromycin (Zithromax Z-Valentín) 250 MG tablet     Sig: Take 2 tablets by mouth on day 1, then 1 tablet daily on days 2-5     Dispense:  6 tablet     Refill:  0                  I spent 20 minutes caring for Lj on this date of service. This time includes time spent by me in the following activities:preparing for the visit, reviewing tests, obtaining and/or reviewing a separately obtained history, performing a medically appropriate examination and/or evaluation , counseling and educating the patient/family/caregiver, ordering medications, tests, or procedures, referring and communicating with other health care professionals , documenting information in the medical record, independently interpreting results and communicating that information with the patient/family/caregiver, and care coordination  FOLLOW UP  No follow-ups on file.  Patient was given instructions and counseling regarding his condition or for health maintenance advice. Please see specific information pulled into the AVS if appropriate.       Andrzej Ledesma MD  09/06/24  08:21 EDT    CURRENT & DISCONTINUED MEDICATIONS  Current Outpatient Medications   Medication Instructions    azithromycin (Zithromax Z-Valentín) 250 MG tablet Take 2 tablets by mouth on day 1, then 1 tablet daily on days 2-5    Cyanocobalamin 1000 MCG sublingual tablet 1 tablet, Sublingual, Daily    ketorolac (ACULAR) 0.5 % ophthalmic solution No dose, route, or frequency recorded.    lisinopril-hydrochlorothiazide (PRINZIDE,ZESTORETIC) 10-12.5 MG per tablet 1 tablet, Oral, Daily    prednisoLONE acetate (PRED FORTE) 1 % ophthalmic suspension 1 drop, 4 Times Daily    rosuvastatin (CRESTOR) 40 mg, Oral, Daily, generic       There are no discontinued medications.

## 2024-09-09 ENCOUNTER — HOSPITAL ENCOUNTER (OUTPATIENT)
Dept: MRI IMAGING | Facility: HOSPITAL | Age: 74
Discharge: HOME | End: 2024-09-09
Admitting: STUDENT IN AN ORGANIZED HEALTH CARE EDUCATION/TRAINING PROGRAM
Payer: MEDICARE

## 2024-09-09 DIAGNOSIS — R29.898 WEAKNESS OF BOTH LOWER EXTREMITIES: ICD-10-CM

## 2024-09-09 PROCEDURE — 72148 MRI LUMBAR SPINE W/O DYE: CPT

## 2024-09-13 ENCOUNTER — TREATMENT (OUTPATIENT)
Dept: PHYSICAL THERAPY | Facility: CLINIC | Age: 74
End: 2024-09-13
Payer: MEDICARE

## 2024-09-13 DIAGNOSIS — R26.89 BALANCE PROBLEM: ICD-10-CM

## 2024-09-13 DIAGNOSIS — R53.1 GENERALIZED WEAKNESS: ICD-10-CM

## 2024-09-13 DIAGNOSIS — R29.898 LEG WEAKNESS, BILATERAL: ICD-10-CM

## 2024-09-13 DIAGNOSIS — M25.611 DECREASED ROM OF RIGHT SHOULDER: ICD-10-CM

## 2024-09-13 DIAGNOSIS — R26.9 GAIT DISTURBANCE: ICD-10-CM

## 2024-09-13 DIAGNOSIS — R26.89 BALANCE DISORDER: Primary | ICD-10-CM

## 2024-09-13 NOTE — PROGRESS NOTES
Outpatient Physical Therapy  [address] [facility], [place] [address]                            Physical Therapy Daily Treatment Note    Patient: Lj Crenshaw   : 1950  Diagnosis/ICD-10 Code:  Balance disorder [R26.89]  Referring practitioner: Andrzej Cardona, *  Date of Initial Visit: Type: THERAPY  Noted: 3/27/2024  Today's Date: 2024  Patient seen for 40 sessions           Subjective   Lj Crenshaw reports: that his legs have been feeling pretty good, states that he had his back MRI last week and he does have some herniated discs.    Objective   MRI Lumbar Spine: 24    IMPRESSION:  Impression:  Degenerative changes of the lumbar spine as above with moderate L3-4 and mild L2-3 canal narrowing. Varying multilevel neural foraminal narrowing, severe on the left at L3-4. Level-by-level details as above.     2.5 cm right renal cyst.     Multiple sacral Tarlov cysts.    See Exercise, Manual, and Modality Logs for complete treatment.     Assessment/Plan  Lj progressing as evident by increased tolerance of PT session. Pt required Stand by Assist for Safety throughout PT session, general fatigue. Pt would benefit from skilled PT to address strength and endurance deficits, transfer and gait training and any concerns with ADLs.       Progress per Plan of Care         Timed:  Manual Therapy:        mins  20400;  Therapeutic Exercise:    15     mins  03619;     Neuromuscular Reyna:    10    mins  31437;    Therapeutic Activity:     15     mins  73456;     Gait Training:           mins  43428;        Timed Treatment:   40   mins   Total Treatment:     40   mins      Electronically signed:   Trudy Mckay PTA  Physical Therapist Assistant  [facility] License #: [id number]

## 2024-09-20 ENCOUNTER — TREATMENT (OUTPATIENT)
Dept: PHYSICAL THERAPY | Facility: CLINIC | Age: 74
End: 2024-09-20
Payer: MEDICARE

## 2024-09-20 DIAGNOSIS — M25.611 DECREASED ROM OF RIGHT SHOULDER: ICD-10-CM

## 2024-09-20 DIAGNOSIS — R26.89 BALANCE PROBLEM: ICD-10-CM

## 2024-09-20 DIAGNOSIS — R53.1 GENERALIZED WEAKNESS: ICD-10-CM

## 2024-09-20 DIAGNOSIS — R29.898 LEG WEAKNESS, BILATERAL: ICD-10-CM

## 2024-09-20 DIAGNOSIS — R26.89 BALANCE DISORDER: Primary | ICD-10-CM

## 2024-09-20 DIAGNOSIS — R26.9 GAIT DISTURBANCE: ICD-10-CM

## 2024-09-27 ENCOUNTER — TREATMENT (OUTPATIENT)
Dept: PHYSICAL THERAPY | Facility: CLINIC | Age: 74
End: 2024-09-27
Payer: MEDICARE

## 2024-09-27 DIAGNOSIS — R26.89 BALANCE DISORDER: Primary | ICD-10-CM

## 2024-09-27 DIAGNOSIS — R53.1 GENERALIZED WEAKNESS: ICD-10-CM

## 2024-09-27 DIAGNOSIS — R26.9 GAIT DISTURBANCE: ICD-10-CM

## 2024-09-27 DIAGNOSIS — R29.898 LEG WEAKNESS, BILATERAL: ICD-10-CM

## 2024-09-27 DIAGNOSIS — R26.89 BALANCE PROBLEM: ICD-10-CM

## 2024-09-27 DIAGNOSIS — M25.611 DECREASED ROM OF RIGHT SHOULDER: ICD-10-CM

## 2024-10-08 ENCOUNTER — TREATMENT (OUTPATIENT)
Dept: PHYSICAL THERAPY | Facility: CLINIC | Age: 74
End: 2024-10-08
Payer: MEDICARE

## 2024-10-08 DIAGNOSIS — R29.898 LEG WEAKNESS, BILATERAL: ICD-10-CM

## 2024-10-08 DIAGNOSIS — R26.89 BALANCE DISORDER: Primary | ICD-10-CM

## 2024-10-08 DIAGNOSIS — R53.1 GENERALIZED WEAKNESS: ICD-10-CM

## 2024-10-08 DIAGNOSIS — R26.89 BALANCE PROBLEM: ICD-10-CM

## 2024-10-08 DIAGNOSIS — R26.9 GAIT DISTURBANCE: ICD-10-CM

## 2024-10-08 DIAGNOSIS — M25.611 DECREASED ROM OF RIGHT SHOULDER: ICD-10-CM

## 2024-10-08 NOTE — PROGRESS NOTES
Re-Assessment / Re-Certification  [address] [place] [id number]      Patient: Lj Crenshaw   : 1950  Diagnosis/ICD-10 Code:  Balance disorder [R26.89]  Referring practitioner: Andrzej Cardona, *  Date of Initial Visit: Type: THERAPY  Noted: 3/27/2024  Today's Date: 10/8/2024  Patient seen for 43 sessions      Subjective:   Lj Crenshaw reports: 0/10  Clinical Progress: improved  Home Program Compliance: Yes  Treatment has included: therapeutic exercise, neuromuscular re-education, manual therapy, therapeutic activity, and gait training    Subjective     Pt reports he has been compliant with HEP, but reports he still feels like he walking slower than he used to. Pt presents to PT with AFO on R foot, patient reports that he doesn't like the way his AFO fits anymore but hasn't had a chance to go somewhere to get it adjusted or get a new one.     Objective     Objective      30 second STS: 5 times with use of UE at 23.5 inch table      TU.1 seconds with rollator with airex in chair (average of 3 trials)      Strength/Myotome Testing      Left Shoulder      Planes of Motion   Flexion: 5   Abduction: 5   External rotation at 0°: 5   Internal rotation at 0°: 5      Isolated Muscles   Biceps: 5   Triceps: 5      Right Shoulder      Planes of Motion   Flexion: 4 increase in shoulder pain  Abduction: 4 increase in shoulder pain   External rotation at 0°: 5  Internal rotation at 0°: 5     Isolated Muscles   Biceps: 5  Triceps: 5     Left Hip   Planes of Motion   Flexion: 2+  Abduction: 3+  Adduction: 2-     Right Hip   Planes of Motion   Flexion: 2+  Abduction: 3+  Adduction: 2-     Left Knee   Flexion: 3  Extension: 4-     Right Knee   Flexion: 3-  Extension: 4-     Left Ankle/Foot   Dorsiflexion: 0     Right Ankle/Foot   Dorsiflexion: 0     Feet apart EO: 30 seconds  Feet together EO: 30 seconds  Tandem stance: L foot in front: 2 seconds                             R foot in front:  30 seconds     6 minute walk test: 583 feet with AD - no AFO on R LE      Assessment/Plan    Pt has made progress with improving some functional measures such as improving 6 minute walk test distance and 30 second STS since last progress note. Pt has had a decrease in strength in B hamstring and quad strength and R UE strength since last progress note. Pt has had an improvement in balance testing over the last month. Pt does require continued still PT in order to address deficits listed in order to return patient back to maximum function such as going to grandchildren's games. Will continue to progress patient as able.     Goals  Plan Goals:      1. Mobility: Walking/Moving Around Functional Limitation                               LTG 1: 12 weeks:  The patient will demonstrate TUG score <16 seconds and perform 9 STS in 30 seconds for reduced fall risk.                           STATUS:  IN PROGRESS              STG 1a: 6 weeks:  The patient will demonstrate TUG score <18 seconds  and perform 7 STS in 30 seconds for reduced fall risk.                           STATUS:  IN PROGRESS              TREATMENT:  Manual therapy, therapeutic exercise, home exercise instruction, and modalities as needed to include: moist heat, electrical stimulation, and ultrasound.        2. The patient has limited strength of the B shoulders and B hips.              LTG 2: 12 weeks:  The patient will demonstrate 4+ /5 strength for B shoulder flexion, abduction, external rotation, and internal rotation in order to demonstrate improved shoulder stability.                          STATUS:  MET              LTG 2a: 12 weeks:  The patient will demonstrate 4+/5 strength for B hip flexion, abduction, and extension in order to improve hip stability.                          STATUS:  IN PROGRESS              TREATMENT: Manual therapy, therapeutic exercise, home exercise instruction, and modalities as needed to include: electrical stimulation,  ultrasound, moist heat, and ice.      3. The patient has difficulty with balance.               LTG 3: 12 weeks: The patient will hold the romberg position with eyes open for 20 seconds in order to improve balance and decrease risk of falling.                           STATUS: IN PROGRESS              STG 3: 6 weeks: The patient will hold the romberg position with eyes open for 10 seconds in order to improve balance and decrease risk of falling.                           STATUS: IN PROGRESS              TREATMENT:  Manual therapy, neuromuscular re-education, gait training, canalith repositioning maneuver, therapeutic exercise, home exercise instruction, and modalities as needed to include: moist heat, electrical stimulation, and ultrasound.       4. The patient has difficulty with endurance.               LTG 4: 12 weeks: The patient will be able to ambulate 750 with rollator in 6 minute walk test to decrease his risk for falling in community outings.               STATUS: IN PROGRESS              TREATMENT:  Manual therapy, neuromuscular re-education, gait training, canalith repositioning maneuver, therapeutic exercise, home exercise instruction, and modalities as needed to include: moist heat, electrical stimulation, and ultrasound.         Progress toward previous goals: Not Met    See Exercise, Manual, and Modality Logs for complete treatment.         Recommendations: Continue as planned  Timeframe: 1x a month for 2 months  Prognosis to achieve goals: good    PT Signature: Electronically signed by Tal Guerra, MARIXA  [place] LICENSE: [id number]      Based upon review of the patient's progress and continued therapy plan, it is my medical opinion that Lj Crenshaw should continue physical therapy treatment at [facility]  [address]  [place] [address] [phone].    Signature: __________________________________  Andrzej Cardona MD    Timed:            Timed:         Manual Therapy:    0     mins  20517;     Therapeutic Exercise:    10     mins  30769;     Neuromuscular Reyna:    0    mins  32232;    Therapeutic Activity:     20     mins  56232;     Gait Trainin     mins  69362;     Ultrasound:     0     mins  12024;    Ionto                               0    mins   12927  Self pay                         0     mins [id number]    Un-Timed:  Electrical Stimulation:    0     mins  11134 ( )  Canalith Repos    0     mins 76950  Dry Needling     0     mins self-pay  Traction     0     mins 04255  Re-Eval                           15    mins  19539    Timed Treatment:   30   mins   Total Treatment:     45   mins    PT SIGNATURE: Electronically signed by Tal Guerra PT  [place] LICENSE: [id number]     DATE TREATMENT INITIATED: 10/8/2024    90 Day Recertification  Certification Period: 10/8/2024 thru 2025  I certify that the therapy services are furnished while this patient is under my care.  The services outlined above are required by this patient, and will be reviewed every 90 days.     PHYSICIAN: Andrzej Cardona MD   NPI: [id number]      DATE:     Please sign and return via fax to [phone] Thank you, [facility].

## 2024-10-10 ENCOUNTER — OFFICE VISIT (OUTPATIENT)
Dept: NEUROSURGERY | Facility: CLINIC | Age: 74
End: 2024-10-10
Payer: MEDICARE

## 2024-10-10 VITALS
HEIGHT: 78 IN | BODY MASS INDEX: 31.35 KG/M2 | DIASTOLIC BLOOD PRESSURE: 62 MMHG | WEIGHT: 271 LBS | OXYGEN SATURATION: 95 % | HEART RATE: 67 BPM | SYSTOLIC BLOOD PRESSURE: 104 MMHG

## 2024-10-10 DIAGNOSIS — M51.26 HERNIATED NUCLEUS PULPOSUS, L3-4: Primary | ICD-10-CM

## 2024-10-10 DIAGNOSIS — M47.816 FACET ARTHROPATHY, LUMBAR: ICD-10-CM

## 2024-10-10 DIAGNOSIS — M48.061 SPINAL STENOSIS, LUMBAR REGION, WITHOUT NEUROGENIC CLAUDICATION: ICD-10-CM

## 2024-10-10 DIAGNOSIS — G89.29 CHRONIC MIDLINE LOW BACK PAIN WITHOUT SCIATICA: ICD-10-CM

## 2024-10-10 DIAGNOSIS — M54.50 CHRONIC MIDLINE LOW BACK PAIN WITHOUT SCIATICA: ICD-10-CM

## 2024-10-10 DIAGNOSIS — M48.061 FORAMINAL STENOSIS OF LUMBAR REGION: ICD-10-CM

## 2024-10-10 NOTE — PROGRESS NOTES
"Chief Complaint  Establish Care (New patient establishing care) and Back Pain (Patient complains of back pain, believes a lot of his trouble comes from bending over to use his walker due to his height. Patient had MRI on 9/9/2024, indicates Foraminal Stenosis of Lumbosacral region)    Subjective          Lj Crenshaw who is a 74 y.o. year old male who presents to [facility] for Evaluation of the Spine.     The patient complains of pain located in the Lumbar Spine.  Patients states the pain has been present for a long time.  The pain came on gradually.  The pain scaled level is 0.  The pain  does not radiate .  The pain is Intermittent and described as  \"nagging\" .  The pain is worse in the morning. Patient states  getting up and walking makes the pain better.  Patient states Bending makes the pain worse.    Associated Symptoms Include: Weakness in right foot s/p knee surgery. Denies numbness, tingling or loss of bowel or bladder control.  Conservative Interventions Include: Physical Therapy that was somewhat effective.    Was this the result of an injury or accident? : No    History of Previous Spinal Surgery?: No     reports that he quit smoking about 8 years ago. His smoking use included cigarettes. He started smoking about 19 years ago. He has a 11 pack-year smoking history. He has never used smokeless tobacco.    Review of Systems   Musculoskeletal:  Positive for back pain.   Neurological:  Positive for weakness.        Objective   Vital Signs:   /62 (BP Location: Right arm, Patient Position: Sitting, Cuff Size: Large Adult)   Pulse 67   Ht 198.1 cm (77.99\")   Wt 123 kg (271 lb)   SpO2 95%   BMI 31.33 kg/m²       Physical Exam  Constitutional:       Appearance: Normal appearance. He is obese.   Pulmonary:      Effort: Pulmonary effort is normal.   Musculoskeletal:         General: No tenderness.      Comments: SLR negative bilaterally   Neurological: "      General: No focal deficit present.      Mental Status: He is alert and oriented to person, place, and time.      Sensory: No sensory deficit.      Motor: Weakness (right ankle flexion) present.      Deep Tendon Reflexes: Reflexes normal.   Psychiatric:         Mood and Affect: Mood normal.         Behavior: Behavior normal.        Neurological Exam  Mental Status  Alert. Oriented to person, place, and time.      Result Review     I have personally interpreted the MRI of the lumbar spine without contrast from 9/9/2024 which shows multilevel degenerative disc disease and facet arthritis.  There is moderate central canal narrowing at L3-L4 with severe left and moderate right foraminal narrowing.  There is moderate left foraminal narrowing at L2-L3.     Assessment and Plan    Diagnoses and all orders for this visit:    1. Herniated nucleus pulposus, L3-4 (Primary)  -     Ambulatory Referral to Physical Therapy for Evaluation & Treatment    2. Spinal stenosis, lumbar region, without neurogenic claudication  -     Ambulatory Referral to Physical Therapy for Evaluation & Treatment    3. Foraminal stenosis of lumbar region  -     Ambulatory Referral to Physical Therapy for Evaluation & Treatment    4. Facet arthropathy, lumbar  -     Ambulatory Referral to Physical Therapy for Evaluation & Treatment    5. Chronic midline low back pain without sciatica  -     Ambulatory Referral to Physical Therapy for Evaluation & Treatment    He has pain mostly in the lower back.    We discussed surgery typically is not helpful for back pain.    He has some left foraminal narrowing worse at L3-L4. If he develops leg pain, surgery may be recommended to address it.    I will refer him for PT targeting the spine. He currently attend PT for his right foot drop after knee surgery.    The patient was counseled on basic recommendations for the reduction and prevention of back, neck, or spine pain in association with spinal disorders,  including: cessation/avoidance of nicotine use, maintenance of a healthy BMI and weight, focusing on building/maintaining core strength through core exercise, and avoidance of activities which worsen the pain. The patient will monitor for changes in symptoms and notify our clinic of these changes as needed.    Follow Up   Return if symptoms worsen or fail to improve.  Patient was given instructions and counseling regarding his condition or for health maintenance advice. Please see specific information pulled into the AVS if appropriate.

## 2024-10-16 ENCOUNTER — TREATMENT (OUTPATIENT)
Dept: PHYSICAL THERAPY | Facility: CLINIC | Age: 74
End: 2024-10-16
Payer: MEDICARE

## 2024-10-16 DIAGNOSIS — R26.89 BALANCE DISORDER: Primary | ICD-10-CM

## 2024-10-16 DIAGNOSIS — R26.9 GAIT DISTURBANCE: ICD-10-CM

## 2024-10-16 DIAGNOSIS — R29.898 LEG WEAKNESS, BILATERAL: ICD-10-CM

## 2024-10-16 DIAGNOSIS — R53.1 GENERALIZED WEAKNESS: ICD-10-CM

## 2024-10-16 DIAGNOSIS — M25.611 DECREASED ROM OF RIGHT SHOULDER: ICD-10-CM

## 2024-10-16 NOTE — PROGRESS NOTES
[facility]                              Physical Therapy Daily Treatment Note    Patient: Lj Crenshaw   : 1950  Diagnosis/ICD-10 Code:  Balance disorder [R26.89]  Referring practitioner: Andrzej Cardona, *  Date of Initial Visit: Type: THERAPY  Noted: 3/27/2024  Today's Date: 10/16/2024  Patient seen for 44 sessions           Subjective   Lj Crenshaw reports: he is feeling good today. While patient was doing sit to stands with the 5lb ankle weights, he stated that it felt different but felt like ti was helping him feel stable when standing.       Objective       See Exercise, Manual, and Modality Logs for complete treatment.     Assessment/Plan  Patient did well with ambulating in clinic today and did not require an seated rest breaks. Pt will continue to benefit from skilled PT services to address strength deficits and stabilization in order to return patient back to maximum function to include being independent with ADL's.    Progress per Plan of Care         Timed:  Manual Therapy:         mins  92986;  Therapeutic Exercise:    10     mins  98733;     Neuromuscular Reyna:    15    mins  45183;    Therapeutic Activity:     15     mins  42710;     Gait Training:           mins  74738;        Untimed:  Electrical Stimulation:         mins  45036 ( );  Mechanical Traction:         mins  23759;       Timed Treatment:   40   mins   Total Treatment:     40   mins      Electronically signed:     Josie Ingram PTA  Physical Therapist Assistant  [facility] License #: [id number]

## 2024-10-21 ENCOUNTER — TELEPHONE (OUTPATIENT)
Dept: FAMILY MEDICINE CLINIC | Facility: CLINIC | Age: 74
End: 2024-10-21
Payer: MEDICARE

## 2024-10-21 NOTE — TELEPHONE ENCOUNTER
"Caller: Lj Crenshaw \"Poli\"    Relationship: Self    Best call back number: [phone]     What is the best time to reach you: ANY    Who are you requesting to speak with (clinical staff, provider,  specific staff member): CLINICAL STAFF    What was the call regarding: PATIENT CALLED STATING THAT HE WAS SUPPOSED TO CHANGE HIS PHYSICAL THERAPY TO TWICE A WEEK AND HASN'T HEARD ANYTHING. HE WOULD LIKE AN UPDATE  "

## 2024-10-21 NOTE — TELEPHONE ENCOUNTER
Relay     He would need to talk to physical therapy about this.  He has an appointment with them tomorrow he has already been seeing them

## 2024-10-22 ENCOUNTER — TREATMENT (OUTPATIENT)
Dept: PHYSICAL THERAPY | Facility: CLINIC | Age: 74
End: 2024-10-22
Payer: MEDICARE

## 2024-10-22 DIAGNOSIS — R26.9 GAIT DISTURBANCE: ICD-10-CM

## 2024-10-22 DIAGNOSIS — R53.1 GENERALIZED WEAKNESS: ICD-10-CM

## 2024-10-22 DIAGNOSIS — R26.89 BALANCE DISORDER: Primary | ICD-10-CM

## 2024-10-22 DIAGNOSIS — M21.372 FOOT DROP, LEFT: ICD-10-CM

## 2024-10-22 DIAGNOSIS — R26.89 BALANCE PROBLEM: ICD-10-CM

## 2024-10-22 DIAGNOSIS — M21.371 FOOT DROP, RIGHT: ICD-10-CM

## 2024-10-22 DIAGNOSIS — R29.898 LEG WEAKNESS, BILATERAL: ICD-10-CM

## 2024-10-22 DIAGNOSIS — G89.29 CHRONIC BACK PAIN, UNSPECIFIED BACK LOCATION, UNSPECIFIED BACK PAIN LATERALITY: ICD-10-CM

## 2024-10-22 DIAGNOSIS — M54.9 CHRONIC BACK PAIN, UNSPECIFIED BACK LOCATION, UNSPECIFIED BACK PAIN LATERALITY: ICD-10-CM

## 2024-10-22 NOTE — PROGRESS NOTES
Physical Therapy Daily Note  [address] [place] [address]    VISIT#: 45    Subjective   Lj Crenshaw reports he got his AFO adjusted and reports it is much more comfortable to wear. Pt reports he is going to get an AFO for his L foot as well. Pt reports he saw neurosurgery and referral was put in for PT. Pt reports he sees neurology in December.        Objective     See Exercise, Manual, and Modality Logs for complete treatment.     Assessment/Plan    Continued to progress patient's strengthening exercises and patient tolerated well with no reports of increase in pain. Pt did have improvement with standing upright with #5 ankle weights on ankles. Will continue to progress patient as able.     Progress per Plan of Care and Progress strengthening /stabilization /functional activity            Timed:                 Manual Therapy:    0     mins  64144;     Therapeutic Exercise:    30     mins  15243;     Neuromuscular Reyna:    0    mins  82756;    Therapeutic Activity:     15     mins  36531;     Gait Trainin     mins  38065;     Ultrasound:     0     mins  37171;    Ionto                               0    mins   49295  Self pay                         0     mins [email or web address]    Un-Timed:  Electrical Stimulation:    0     mins  18722 ( )  Canalith Repos    0     mins 21499  Dry Needling     0     mins self-pay  Traction     0     mins 15499    Timed Treatment:   45   mins   Total Treatment:     45   mins    Tal Guerra PT  Physical Therapist    PT SIGNATURE: Electronically signed by Tal Guerra PT  [place] LICENSE: [id number]

## 2024-10-29 ENCOUNTER — TREATMENT (OUTPATIENT)
Dept: PHYSICAL THERAPY | Facility: CLINIC | Age: 74
End: 2024-10-29
Payer: MEDICARE

## 2024-10-29 DIAGNOSIS — R26.9 GAIT DISTURBANCE: ICD-10-CM

## 2024-10-29 DIAGNOSIS — M54.50 CHRONIC BILATERAL LOW BACK PAIN WITHOUT SCIATICA: ICD-10-CM

## 2024-10-29 DIAGNOSIS — R29.898 LEG WEAKNESS, BILATERAL: ICD-10-CM

## 2024-10-29 DIAGNOSIS — G89.29 CHRONIC BILATERAL LOW BACK PAIN WITHOUT SCIATICA: ICD-10-CM

## 2024-10-29 DIAGNOSIS — R53.1 GENERALIZED WEAKNESS: ICD-10-CM

## 2024-10-29 DIAGNOSIS — R26.89 BALANCE DISORDER: Primary | ICD-10-CM

## 2024-10-29 DIAGNOSIS — R26.89 BALANCE PROBLEM: ICD-10-CM

## 2024-10-29 DIAGNOSIS — M25.611 DECREASED ROM OF RIGHT SHOULDER: ICD-10-CM

## 2024-10-29 NOTE — PROGRESS NOTES
Re-Assessment / Re-Certification  [address] [place] [id number]      Patient: Lj Crenshaw   : 1950  Diagnosis/ICD-10 Code:  Balance disorder [R26.89]  Referring practitioner: Andrzej Cardona, *  Date of Initial Visit: Type: THERAPY  Noted: 3/27/2024  Today's Date: 10/29/2024  Patient seen for 46 sessions      Subjective:   Lj Crenshaw reports: 2/10  Subjective Questionnaire: Oswestry: 15/45  Clinical Progress: unchanged  Home Program Compliance: Yes  Treatment has included: therapeutic exercise, neuromuscular re-education, manual therapy, therapeutic activity, and gait training    Subjective     Performing re-evaluation today in order to add LBP to diagnosis due to having a new referral put in for LBP. Pt went to neurosurgery after having MRI revealing:     Degenerative changes of the lumbar spine as above with moderate L3-4 and mild L2-3 canal narrowing. Varying multilevel neural foraminal narrowing, severe on the left at L3-4. Level-by-level details as above.     Pt was referred then to PT. Will continue with treating patient with adding LBP to the diagnosis. Pt reports he feels like having to lean over with his walker doesn't help his back pain.       Objective     Strength    Left Hip   Planes of Motion   Flexion: 2+  Abduction: 3+  Adduction: 2-     Right Hip   Planes of Motion   Flexion: 2+  Abduction: 3+  Adduction: 2-     Left Knee   Flexion: 4-  Extension: 4-     Right Knee   Flexion: 3-  Extension: 4-     Left Ankle/Foot   Dorsiflexion: 0     Right Ankle/Foot   Dorsiflexion: 0    Assessment/Plan    Adding LBP to patient's diagnosis codes. Pt did demonstrate an improvement with R knee flexion strength since last time was assessed. Pt otherwise maintained strength from last assessment which was performed 3 weeks ago. Pt does have decrease in strength, balance, endurance, increase in back pain, and bilateral drop foot. Currently trying to find a taller walker for  patient. Also patient is having brace made for L foot. Pt has evaluation with neurology in December. Will continue to progress patient as able. Will be increasing frequency to 2x a week due to adding new diagnosis.     Goals  Plan Goals:      1. Mobility: Walking/Moving Around Functional Limitation                               LTG 1: 12 weeks:  The patient will demonstrate TUG score <16 seconds and perform 9 STS in 30 seconds for reduced fall risk.                           STATUS:  IN PROGRESS              STG 1a: 6 weeks:  The patient will demonstrate TUG score <18 seconds  and perform 7 STS in 30 seconds for reduced fall risk.                           STATUS:  IN PROGRESS              TREATMENT:  Manual therapy, therapeutic exercise, home exercise instruction, and modalities as needed to include: moist heat, electrical stimulation, and ultrasound.        2. The patient has limited strength of the B shoulders and B hips.              LTG 2: 12 weeks:  The patient will demonstrate 4+ /5 strength for B shoulder flexion, abduction, external rotation, and internal rotation in order to demonstrate improved shoulder stability.                          STATUS:  MET              LTG 2a: 12 weeks:  The patient will demonstrate 4+/5 strength for B hip flexion, abduction, and extension in order to improve hip stability.                          STATUS:  IN PROGRESS              TREATMENT: Manual therapy, therapeutic exercise, home exercise instruction, and modalities as needed to include: electrical stimulation, ultrasound, moist heat, and ice.      3. The patient has difficulty with balance.               LTG 3: 12 weeks: The patient will hold the romberg position with eyes open for 20 seconds in order to improve balance and decrease risk of falling.                           STATUS: IN PROGRESS              STG 3: 6 weeks: The patient will hold the romberg position with eyes open for 10 seconds in order to improve  balance and decrease risk of falling.                           STATUS: IN PROGRESS              TREATMENT:  Manual therapy, neuromuscular re-education, gait training, canalith repositioning maneuver, therapeutic exercise, home exercise instruction, and modalities as needed to include: moist heat, electrical stimulation, and ultrasound.       4. The patient has difficulty with endurance.               LTG 4: 12 weeks: The patient will be able to ambulate 750 with rollator in 6 minute walk test to decrease his risk for falling in community outings.               STATUS: IN PROGRESS              TREATMENT:  Manual therapy, neuromuscular re-education, gait training, canalith repositioning maneuver, therapeutic exercise, home exercise instruction, and modalities as needed to include: moist heat, electrical stimulation, and ultrasound.      Progress toward previous goals: Partially Met    See Exercise, Manual, and Modality Logs for complete treatment.         Recommendations: Continue as planned  Timeframe: 2x a week for 2 months  Prognosis to achieve goals: good    PT Signature: Electronically signed by Tal Guerra PT  [place] LICENSE: [id number]      Based upon review of the patient's progress and continued therapy plan, it is my medical opinion that Lj Crenshaw should continue physical therapy treatment at [facility] PHYS[facility] THERAP[facility]  [address] [place] [id number] [phone].    Signature: __________________________________  Andrzej Cardona MD    Timed:           Timed:         Manual Therapy:    0     mins  03523;     Therapeutic Exercise:    23     mins  00768;     Neuromuscular Reyna:    0    mins  33177;    Therapeutic Activity:     15     mins  13585;     Gait Trainin     mins  58373;     Ultrasound:     0     mins  04134;    Ionto                               0    mins   00371  Self pay                         0     mins  [email or web address]    Un-Timed:  Electrical Stimulation:    0     mins  87113 ( )  Canalith Repos    0     mins 91428  Dry Needling     0     mins self-pay  Traction     0     mins 42899  Re-Eval                           8    mins  90514    Timed Treatment:   38   mins   Total Treatment:     48   mins    PT SIGNATURE: Electronically signed by Tal Guerra PT  [place] LICENSE: [id number]     DATE TREATMENT INITIATED: 10/29/2024    90 Day Recertification  Certification Period: 10/29/2024 thru 1/26/2025  I certify that the therapy services are furnished while this patient is under my care.  The services outlined above are required by this patient, and will be reviewed every 90 days.     PHYSICIAN: Andrzej Cardona MD   NPI: [id number]      DATE:     Please sign and return via fax to [phone] Thank you, [facility].

## 2024-11-05 ENCOUNTER — TREATMENT (OUTPATIENT)
Dept: PHYSICAL THERAPY | Facility: CLINIC | Age: 74
End: 2024-11-05
Payer: MEDICARE

## 2024-11-05 ENCOUNTER — TELEPHONE (OUTPATIENT)
Dept: FAMILY MEDICINE CLINIC | Facility: CLINIC | Age: 74
End: 2024-11-05
Payer: MEDICARE

## 2024-11-05 DIAGNOSIS — R26.9 GAIT DISTURBANCE: ICD-10-CM

## 2024-11-05 DIAGNOSIS — R26.89 BALANCE DISORDER: Primary | ICD-10-CM

## 2024-11-05 DIAGNOSIS — G89.29 CHRONIC BILATERAL LOW BACK PAIN WITHOUT SCIATICA: ICD-10-CM

## 2024-11-05 DIAGNOSIS — R53.1 GENERALIZED WEAKNESS: ICD-10-CM

## 2024-11-05 DIAGNOSIS — M54.50 CHRONIC BILATERAL LOW BACK PAIN WITHOUT SCIATICA: ICD-10-CM

## 2024-11-05 DIAGNOSIS — R29.898 LEG WEAKNESS, BILATERAL: ICD-10-CM

## 2024-11-05 DIAGNOSIS — R26.89 BALANCE PROBLEM: ICD-10-CM

## 2024-11-05 NOTE — PROGRESS NOTES
[facility]                              Physical Therapy Daily Treatment Note    Patient: Lj Crenshaw   : 1950  Diagnosis/ICD-10 Code:  Balance disorder [R26.89]  Referring practitioner: Andrzej Cardona, *  Date of Initial Visit: Type: THERAPY  Noted: 3/27/2024  Today's Date: 2024  Patient seen for 47 sessions           Subjective   Lj Crenshaw reports: he has been feeling pretty good. Pt states his low back isn't hurting him today.      Objective     See Exercise, Manual, and Modality Logs for complete treatment.     Assessment/Plan  Patient required less seated rest breaks this session during exercises. Pt ambulate 5 laps in clinic today, on his last lap he did have a decrease in his garcía. Pt will continue to benefit from skilled PT services to address strength deficits and stabilization in order to return patient back to maximum function to include being independent with ADL's    Progress per Plan of Care         Timed:  Manual Therapy:         mins  42553;  Therapeutic Exercise:    15     mins  94235;     Neuromuscular Reyna:    15    mins  44284;    Therapeutic Activity:     10     mins  31443;     Gait Training:           mins  35042;        Untimed:  Electrical Stimulation:         mins  01764 ( );  Mechanical Traction:         mins  92090;       Timed Treatment:   40   mins   Total Treatment:     40   mins      Electronically signed:     Josie Ingram PTA  Physical Therapist Assistant  [facility] License #: [id number]

## 2024-11-05 NOTE — TELEPHONE ENCOUNTER
Caller: [employer]    Relationship to patient:     Best call back number: [phone]    Patient is needing: CALLER FROM [facility] [employer] CALLED NEEDING TO KNOW IF PATIENT HAD EVER COMPLETED THE FACE TO FACE APPT THAT HE NEEDS TO RECEIVED HIS POWER WHEELCHAIR. CALLER STATES IF HE DID HAVE THIS APPT COMPLETED COULD THE NOTES BE FAXED OVER TO HER .[phone]

## 2024-11-08 ENCOUNTER — TREATMENT (OUTPATIENT)
Dept: PHYSICAL THERAPY | Facility: CLINIC | Age: 74
End: 2024-11-08
Payer: MEDICARE

## 2024-11-08 DIAGNOSIS — G89.29 CHRONIC BILATERAL LOW BACK PAIN WITHOUT SCIATICA: ICD-10-CM

## 2024-11-08 DIAGNOSIS — M25.611 DECREASED ROM OF RIGHT SHOULDER: ICD-10-CM

## 2024-11-08 DIAGNOSIS — R26.89 BALANCE DISORDER: Primary | ICD-10-CM

## 2024-11-08 DIAGNOSIS — R26.89 BALANCE PROBLEM: ICD-10-CM

## 2024-11-08 DIAGNOSIS — R26.9 GAIT DISTURBANCE: ICD-10-CM

## 2024-11-08 DIAGNOSIS — M54.50 CHRONIC BILATERAL LOW BACK PAIN WITHOUT SCIATICA: ICD-10-CM

## 2024-11-08 DIAGNOSIS — R29.898 LEG WEAKNESS, BILATERAL: ICD-10-CM

## 2024-11-08 NOTE — PROGRESS NOTES
[facility]                              Physical Therapy Daily Treatment Note    Patient: Lj Crenshaw   : 1950  Diagnosis/ICD-10 Code:  Balance disorder [R26.89]  Referring practitioner: Andrzej Cardona, *  Date of Initial Visit: Type: THERAPY  Noted: 3/27/2024  Today's Date: 2024  Patient seen for 48 sessions           Subjective   Lj Crenshaw reports: he is wearing his AFO on his right leg today. Pt states that his legs feels different when he wear his AFO because of the tennis shoes he has to wear with them are heavier.       Objective     See Exercise, Manual, and Modality Logs for complete treatment.     Assessment/Plan  Patient demonstrated more endurance this session  with less rest breaks and able to complete more activities. Pt will continue to benefit from skilled PT services to address strength deficits and stabilization in order to return patient back to maximum function to include being independent with ADL's     Progress per Plan of Care         Timed:  Manual Therapy:         mins  85729;  Therapeutic Exercise:    10     mins  89110;     Neuromuscular Reyna:    15    mins  08021;    Therapeutic Activity:     15     mins  58730;     Gait Training:           mins  48103;        Untimed:  Electrical Stimulation:         mins  71691 ( );  Mechanical Traction:         mins  43852;       Timed Treatment:   40   mins   Total Treatment:     40   mins      Electronically signed:     Josie Ingram PTA  Physical Therapist Assistant  [facility] License #: [id number]

## 2024-11-12 ENCOUNTER — TREATMENT (OUTPATIENT)
Dept: PHYSICAL THERAPY | Facility: CLINIC | Age: 74
End: 2024-11-12
Payer: MEDICARE

## 2024-11-12 DIAGNOSIS — R53.1 GENERALIZED WEAKNESS: ICD-10-CM

## 2024-11-12 DIAGNOSIS — R26.9 GAIT DISTURBANCE: ICD-10-CM

## 2024-11-12 DIAGNOSIS — M25.611 DECREASED ROM OF RIGHT SHOULDER: ICD-10-CM

## 2024-11-12 DIAGNOSIS — R26.89 BALANCE DISORDER: Primary | ICD-10-CM

## 2024-11-12 DIAGNOSIS — G89.29 CHRONIC BILATERAL LOW BACK PAIN WITHOUT SCIATICA: ICD-10-CM

## 2024-11-12 DIAGNOSIS — R26.89 BALANCE PROBLEM: ICD-10-CM

## 2024-11-12 DIAGNOSIS — M54.50 CHRONIC BILATERAL LOW BACK PAIN WITHOUT SCIATICA: ICD-10-CM

## 2024-11-12 DIAGNOSIS — R29.898 LEG WEAKNESS, BILATERAL: ICD-10-CM

## 2024-11-12 NOTE — PROGRESS NOTES
Physical Therapy Daily Note  [address] [place] [address]    VISIT#: 49    Subjective   Lj Crenshaw reports 0/10 pain today.       Objective     See Exercise, Manual, and Modality Logs for complete treatment.     Assessment/Plan    Performed several functional tasks today to aide with patient in community settings. Had patient perform STS from rollator with airex in rollator and the turn is takes to start pushing the walker again. With performing this task, PT realized patient had significant difficulty with pushing up secondary to tricep weakness. Performed several different tricep strengthening exercises and patient was very fatigued at end of session. Will continue to progress patient as able.      Progress per Plan of Care and Progress strengthening /stabilization /functional activity            Timed:                 Manual Therapy:    0     mins  43441;     Therapeutic Exercise:    15     mins  10614;     Neuromuscular Reyna:    10    mins  24239;    Therapeutic Activity:     20     mins  43725;     Gait Trainin     mins  66000;     Ultrasound:     0     mins  30025;    Ionto                               0    mins   23489  Self pay                         0     mins [email or web address]    Un-Timed:  Electrical Stimulation:    0     mins  28077 ( )  Canalith Repos    0     mins 08888  Dry Needling     0     mins self-pay  Traction     0     mins 95712    Timed Treatment:   45   mins   Total Treatment:     45   mins    Tal Guerra PT  Physical Therapist    PT SIGNATURE: Electronically signed by Tal Guerra PT  [place] LICENSE: [id number]

## 2024-11-15 ENCOUNTER — TREATMENT (OUTPATIENT)
Dept: PHYSICAL THERAPY | Facility: CLINIC | Age: 74
End: 2024-11-15
Payer: MEDICARE

## 2024-11-15 DIAGNOSIS — R29.898 LEG WEAKNESS, BILATERAL: ICD-10-CM

## 2024-11-15 DIAGNOSIS — R26.9 GAIT DISTURBANCE: ICD-10-CM

## 2024-11-15 DIAGNOSIS — R26.89 BALANCE DISORDER: ICD-10-CM

## 2024-11-15 DIAGNOSIS — R26.89 BALANCE PROBLEM: ICD-10-CM

## 2024-11-15 DIAGNOSIS — R53.1 GENERALIZED WEAKNESS: ICD-10-CM

## 2024-11-15 DIAGNOSIS — M54.50 CHRONIC BILATERAL LOW BACK PAIN WITHOUT SCIATICA: Primary | ICD-10-CM

## 2024-11-15 DIAGNOSIS — G89.29 CHRONIC BILATERAL LOW BACK PAIN WITHOUT SCIATICA: Primary | ICD-10-CM

## 2024-11-15 NOTE — PROGRESS NOTES
[facility] [place] [facility]                              Physical Therapy Daily Treatment Note    Patient: Lj Crenshaw   : 1950  Diagnosis/ICD-10 Code:  Chronic bilateral low back pain without sciatica [M54.50, G89.29]  Referring practitioner: Andrzej Cardona, *  Date of Initial Visit: Type: THERAPY  Noted: 3/27/2024  Today's Date: 11/15/2024  Patient seen for 50 sessions           Subjective   Lj Crenshaw reports: he got a brace for his left leg the other day from [employer]. Pt feels like he is walking better.    Objective     See Exercise, Manual, and Modality Logs for complete treatment.     Assessment/Plan  Pt tolerated exercises and activities this session without complaints of any pain. PT did require some rest breaks in between due to fatigue.  Pt will continue to benefit from skilled PT services to address strength deficits and stabilization in order to return patient back to maximum function to include being independent with ADL's     Progress per Plan of Care         Timed:  Manual Therapy:         mins  28188;  Therapeutic Exercise:    10     mins  77823;     Neuromuscular Reyna:    15    mins  19198;    Therapeutic Activity:     15     mins  20778;     Gait Training:           mins  13248;        Untimed:  Electrical Stimulation:         mins  92859 ( );  Mechanical Traction:         mins  84163;       Timed Treatment:   40   mins   Total Treatment:     40   mins      Electronically signed:     Josie Ingram PTA  Physical Therapist Assistant  [facility] License #: [id number]

## 2024-11-18 ENCOUNTER — TREATMENT (OUTPATIENT)
Dept: PHYSICAL THERAPY | Facility: CLINIC | Age: 74
End: 2024-11-18
Payer: MEDICARE

## 2024-11-18 DIAGNOSIS — G89.29 CHRONIC BILATERAL LOW BACK PAIN WITHOUT SCIATICA: Primary | ICD-10-CM

## 2024-11-18 DIAGNOSIS — R26.9 GAIT DISTURBANCE: ICD-10-CM

## 2024-11-18 DIAGNOSIS — R29.898 LEG WEAKNESS, BILATERAL: ICD-10-CM

## 2024-11-18 DIAGNOSIS — R26.89 BALANCE DISORDER: ICD-10-CM

## 2024-11-18 DIAGNOSIS — R53.1 GENERALIZED WEAKNESS: ICD-10-CM

## 2024-11-18 DIAGNOSIS — M54.50 CHRONIC BILATERAL LOW BACK PAIN WITHOUT SCIATICA: Primary | ICD-10-CM

## 2024-11-18 PROCEDURE — 97110 THERAPEUTIC EXERCISES: CPT | Performed by: PHYSICAL THERAPIST

## 2024-11-18 PROCEDURE — 97530 THERAPEUTIC ACTIVITIES: CPT | Performed by: PHYSICAL THERAPIST

## 2024-11-18 PROCEDURE — 97112 NEUROMUSCULAR REEDUCATION: CPT | Performed by: PHYSICAL THERAPIST

## 2024-11-18 NOTE — PROGRESS NOTES
[facility] [place] [facility]                              Physical Therapy Daily Treatment Note    Patient: Lj Crenshaw   : 1950  Diagnosis/ICD-10 Code:  Chronic bilateral low back pain without sciatica [M54.50, G89.29]  Referring practitioner: Andrzej Cardona, *  Date of Initial Visit: Type: THERAPY  Noted: 3/27/2024  Today's Date: 2024  Patient seen for 51 sessions           Subjective   Lj Crenshaw reports: he is a little tired upon arrival for PT. At the end of the session patient states his right knee was starting to feel weak, like it was going to give out.       Objective     See Exercise, Manual, and Modality Logs for complete treatment.     Assessment/Plan  Poli was able to complete more activities ans exercises this session, including increased repetitions. Patient attempt stepping up on 4 inch step at counter, unable to shift weight and push through LE without too much weight bearing through his UE on the counter. Will continue to progress patient per plan of care to address outlined deficits and weaknesses to help improve ADL concerns.     Progress per Plan of Care         Timed:  Manual Therapy:         mins  44189;  Therapeutic Exercise:    15     mins  32376;     Neuromuscular Reyna:    15    mins  66748;    Therapeutic Activity:     10     mins  43488;     Gait Training:           mins  68477;        Untimed:  Electrical Stimulation:         mins  42615 ( );  Mechanical Traction:         mins  94497;       Timed Treatment:   40   mins   Total Treatment:     40   mins      Electronically signed:     Josie Ingram PTA  Physical Therapist Assistant  [facility] License #: [id number]

## 2024-11-20 ENCOUNTER — TREATMENT (OUTPATIENT)
Dept: PHYSICAL THERAPY | Facility: CLINIC | Age: 74
End: 2024-11-20
Payer: MEDICARE

## 2024-11-20 DIAGNOSIS — R26.9 GAIT DISTURBANCE: ICD-10-CM

## 2024-11-20 DIAGNOSIS — R26.89 BALANCE PROBLEM: ICD-10-CM

## 2024-11-20 DIAGNOSIS — R29.898 LEG WEAKNESS, BILATERAL: ICD-10-CM

## 2024-11-20 DIAGNOSIS — M54.50 CHRONIC BILATERAL LOW BACK PAIN WITHOUT SCIATICA: ICD-10-CM

## 2024-11-20 DIAGNOSIS — R53.1 GENERALIZED WEAKNESS: Primary | ICD-10-CM

## 2024-11-20 DIAGNOSIS — G89.29 CHRONIC BILATERAL LOW BACK PAIN WITHOUT SCIATICA: ICD-10-CM

## 2024-11-20 DIAGNOSIS — R26.89 BALANCE DISORDER: ICD-10-CM

## 2024-11-20 NOTE — PROGRESS NOTES
[facility]                              Physical Therapy Daily Treatment Note    Patient: Lj Crenshaw   : 1950  Diagnosis/ICD-10 Code:  Generalized weakness [R53.1]  Referring practitioner: Andrzej Cardona, *  Date of Initial Visit: Type: THERAPY  Noted: 3/27/2024  Today's Date: 2024  Patient seen for 52 sessions           Subjective   Lj Crenshaw reports: he is a little tired today since he didn't sleep well last night. Pt states his arms were sore after last session.      Objective     See Exercise, Manual, and Modality Logs for complete treatment.     Assessment/Plan  Patient required more rest breaks this session due to fatigue. Pt was able to ambulate without ankle weights with a faster garcía and he had a longer step length with his left lower extremity. Will continue to progress patient per plan of care to address outlined weaknesses and deficits in order to help improve functional ADL tasks.     Progress per Plan of Care         Timed:  Manual Therapy:         mins  56780;  Therapeutic Exercise:    15     mins  94579;     Neuromuscular Reyna:    15    mins  70686;    Therapeutic Activity:     15     mins  36710;     Gait Training:           mins  75495;        Untimed:  Electrical Stimulation:         mins  75354 ( );  Mechanical Traction:         mins  99423;       Timed Treatment:   45   mins   Total Treatment:     45   mins      Electronically signed:     Josie Ingram PTA  Physical Therapist Assistant  [facility] License #: [id number]

## 2024-12-03 ENCOUNTER — TREATMENT (OUTPATIENT)
Dept: PHYSICAL THERAPY | Facility: CLINIC | Age: 74
End: 2024-12-03
Payer: MEDICARE

## 2024-12-03 DIAGNOSIS — M25.611 DECREASED ROM OF RIGHT SHOULDER: ICD-10-CM

## 2024-12-03 DIAGNOSIS — G89.29 CHRONIC BILATERAL LOW BACK PAIN WITHOUT SCIATICA: ICD-10-CM

## 2024-12-03 DIAGNOSIS — R26.89 BALANCE DISORDER: ICD-10-CM

## 2024-12-03 DIAGNOSIS — R53.1 GENERALIZED WEAKNESS: Primary | ICD-10-CM

## 2024-12-03 DIAGNOSIS — R29.898 LEG WEAKNESS, BILATERAL: ICD-10-CM

## 2024-12-03 DIAGNOSIS — R26.89 BALANCE PROBLEM: ICD-10-CM

## 2024-12-03 DIAGNOSIS — M54.50 CHRONIC BILATERAL LOW BACK PAIN WITHOUT SCIATICA: ICD-10-CM

## 2024-12-03 DIAGNOSIS — R26.9 GAIT DISTURBANCE: ICD-10-CM

## 2024-12-03 PROCEDURE — 97530 THERAPEUTIC ACTIVITIES: CPT

## 2024-12-03 NOTE — PROGRESS NOTES
Progress Note  [facility] [address] [place] [address]      Patient: Lj Crenshaw   : 1950  Diagnosis/ICD-10 Code:  Generalized weakness [R53.1]  Referring practitioner: Andrzej Cardona, *  Date of Initial Visit: Type: THERAPY  Noted: 3/27/2024  Today's Date: 12/3/2024  Patient seen for 53 sessions      Subjective:   Subjective Questionnaire: Oswestry:  (20% disability)  Clinical Progress: improved  Home Program Compliance: Yes  Treatment has included: therapeutic exercise, neuromuscular re-education, manual therapy, therapeutic activity, and gait training    30 second STS Test: 2 full stand ups where hands left table (decreased control on descent)    TU.32 seconds with use of rolator and B AFO's (normal height chair with airex in seat)     6 minute walk test: 623.2 feet with use of rolator and B AFO's     Subjective     Pt reports that he has not done much in the past week and a half since he has not had therapy. Pt would like to get to walking better. Pt can stand at home in bare feet without any problems.  Pt wears B AFO's. These help him walk better and help him perform heel strike. However, he feels like they disrupt his balance when standing statically.     He does arm exercises at home. He has bands and 5# weights.     Pt is going to [facility] tomorrow because he has an appointment [facility].     Pt is not having any back pain today.     Objective          Strength/Myotome Testing     Left Shoulder     Planes of Motion   Flexion: 5   Abduction: 4+     Isolated Muscles   Biceps: 5     Right Shoulder     Planes of Motion   Flexion: 4+   Abduction: 5     Isolated Muscles   Biceps: 5          Strength     Left Hip   Planes of Motion   Flexion: 2+  Abduction: 5 (sitting)  Adduction: 2-     Right Hip   Planes of Motion   Flexion: 2+  Abduction: 5 (sitting)  Adduction: 2-     Left Knee   Flexion: 4-  Extension: 4+     Right Knee   Flexion: 4  Extension: 4-      Left Ankle/Foot   Dorsiflexion: 0     Right Ankle/Foot   Dorsiflexion: 0      Balance Testing:  Feet apart EO: 1 minute (RW in front) (with B AFO's)  Feet together EO: 17 seconds (with RW in front)(with B AFO's)  Tandem stance: L foot in front: 2.4  seconds (with RW in front)(with B AFO's)                             R foot in front: 14.91 seconds (with RW in front)(with B AFO's)      See Exercise, Manual, and Modality Logs for complete treatment.     Assessment/Plan  Pt tolerated today's session well. Today was reassessment day. Pt's B hip strength (in isolation) has remained relatively the same since the last progress note. His B knee strength has improved. Pt was unable to improve his scores on the 30 second STS test or the TUG test today, indicating no improvements in LE (functional) strength or agility since 10/29/24. However, pt was able to improve his step count on the 6 minute walk test by approx 40 feet. This indicates improvements in LE functional endurance and gait efficiency. Pt was also able to meet his short term static balance goal today. Overall, pt continues to have decrease in strength, balance and endurance, plus bilateral drop foot. Pt has noticed improvements in his ambulation when wearing B AFO's, however, he feels like they impair his static balance. Pt sees the neurologist tomorrow. Will continue to progress patient as able to decrease falls risk. Continue POC.      Goals  Plan Goals:      1. Mobility: Walking/Moving Around Functional Limitation                               LTG 1: 12 weeks:  The patient will demonstrate TUG score <16 seconds and perform 9 STS in 30 seconds for reduced fall risk.                           STATUS:  IN PROGRESS              STG 1a: 6 weeks:  The patient will demonstrate TUG score <18 seconds  and perform 7 STS in 30 seconds for reduced fall risk.                           STATUS:  IN PROGRESS        2. The patient has limited strength of the B shoulders and B  hips.              LTG 2: 12 weeks:  The patient will demonstrate 4+ /5 strength for B shoulder flexion, abduction, external rotation, and internal rotation in order to demonstrate improved shoulder stability.                          STATUS:  MET              LTG 2a: 12 weeks:  The patient will demonstrate 4+/5 strength for B hip flexion, abduction, and extension in order to improve hip stability.                          STATUS:  IN PROGRESS                3. The patient has difficulty with balance.               LTG 3: 12 weeks: The patient will hold the romberg position with eyes open for 20 seconds in order to improve balance and decrease risk of falling.                           STATUS: IN PROGRESS              STG 3: 6 weeks: The patient will hold the romberg position with eyes open for 10 seconds in order to improve balance and decrease risk of falling.                           STATUS: MET     4. The patient has difficulty with endurance.               LTG 4: 12 weeks: The patient will be able to ambulate 750 with rollator in 6 minute walk test to decrease his risk for falling in community outings.               STATUS: IN PROGRESS      Progress toward previous goals: Partially Met      Recommendations: Continue as planned  Timeframe: 1 month  Prognosis to achieve goals: good    Signature: Susan Shelby PT    Electronically signed 12/3/2024    [place] License: PT - [id number]      Timed:  Manual Therapy:    0     mins  07342;  Therapeutic Exercise:    0     mins  99148;     Neuromuscular Reyna:    0    mins  03777;    Therapeutic Activity:     55     mins  94548;     Gait Trainin     mins  15850;     Aquatics                         0      mins  34033    Un-timed:  Mechanical Traction      0     mins  98644  Dry Needling     0     mins self-pay  Electrical Stimulation:    0     mins  39240 ( );    Timed Treatment:   55   mins   Total Treatment:     55   mins

## 2024-12-09 ENCOUNTER — TREATMENT (OUTPATIENT)
Dept: PHYSICAL THERAPY | Facility: CLINIC | Age: 74
End: 2024-12-09
Payer: MEDICARE

## 2024-12-09 DIAGNOSIS — R26.89 BALANCE DISORDER: ICD-10-CM

## 2024-12-09 DIAGNOSIS — M54.50 CHRONIC BILATERAL LOW BACK PAIN WITHOUT SCIATICA: ICD-10-CM

## 2024-12-09 DIAGNOSIS — R29.898 LEG WEAKNESS, BILATERAL: ICD-10-CM

## 2024-12-09 DIAGNOSIS — R53.1 GENERALIZED WEAKNESS: Primary | ICD-10-CM

## 2024-12-09 DIAGNOSIS — R26.9 GAIT DISTURBANCE: ICD-10-CM

## 2024-12-09 DIAGNOSIS — G89.29 CHRONIC BILATERAL LOW BACK PAIN WITHOUT SCIATICA: ICD-10-CM

## 2024-12-09 PROCEDURE — 97112 NEUROMUSCULAR REEDUCATION: CPT | Performed by: PHYSICAL THERAPIST

## 2024-12-09 PROCEDURE — 97530 THERAPEUTIC ACTIVITIES: CPT | Performed by: PHYSICAL THERAPIST

## 2024-12-09 PROCEDURE — 97110 THERAPEUTIC EXERCISES: CPT | Performed by: PHYSICAL THERAPIST

## 2024-12-09 NOTE — PROGRESS NOTES
[facility]                              Physical Therapy Daily Treatment Note    Patient: Lj Crenshaw   : 1950  Diagnosis/ICD-10 Code:  Generalized weakness [R53.1]  Referring practitioner: Andrzej Cardona, *  Date of Initial Visit: Type: THERAPY  Noted: 3/27/2024  Today's Date: 2024  Patient seen for 54 sessions           Subjective   Lj Crenshaw reports: no new complaints at time of arrival.Pt states he knows he doesn't do as much at home and admits that he needs to start.      Objective       See Exercise, Manual, and Modality Logs for complete treatment.     Assessment/Plan  Patient able to take longer lateral steps with weighted side steps today.  Pt did well with bilateral hip adduction exercises. Will continue to progress patient per POC and patient tolerance to decrease falls risks and improve upon functional mobility.              Timed:  Manual Therapy:         mins  67012;  Therapeutic Exercise:    10     mins  29222;     Neuromuscular Reyna:    20    mins  69014;    Therapeutic Activity:     10     mins  68482;     Gait Training:           mins  41609;        Untimed:  Electrical Stimulation:         mins  71221 ( );  Mechanical Traction:         mins  44584;       Timed Treatment:   40   mins   Total Treatment:     40   mins      Electronically signed:     Josie Ingram PTA  Physical Therapist Assistant  [facility] License #: [id number]

## 2024-12-12 ENCOUNTER — TREATMENT (OUTPATIENT)
Dept: PHYSICAL THERAPY | Facility: CLINIC | Age: 74
End: 2024-12-12
Payer: MEDICARE

## 2024-12-12 DIAGNOSIS — R29.898 LEG WEAKNESS, BILATERAL: ICD-10-CM

## 2024-12-12 DIAGNOSIS — R26.9 GAIT DISTURBANCE: ICD-10-CM

## 2024-12-12 DIAGNOSIS — R53.1 GENERALIZED WEAKNESS: ICD-10-CM

## 2024-12-12 DIAGNOSIS — M54.50 CHRONIC BILATERAL LOW BACK PAIN WITHOUT SCIATICA: Primary | ICD-10-CM

## 2024-12-12 DIAGNOSIS — R26.89 BALANCE PROBLEM: ICD-10-CM

## 2024-12-12 DIAGNOSIS — G89.29 CHRONIC BILATERAL LOW BACK PAIN WITHOUT SCIATICA: Primary | ICD-10-CM

## 2024-12-12 PROCEDURE — 97530 THERAPEUTIC ACTIVITIES: CPT

## 2024-12-12 PROCEDURE — 97110 THERAPEUTIC EXERCISES: CPT

## 2024-12-12 PROCEDURE — 97112 NEUROMUSCULAR REEDUCATION: CPT

## 2024-12-12 NOTE — PROGRESS NOTES
[facility]                              Physical Therapy Daily Treatment Note    Patient: Lj Crenshaw   : 1950  Diagnosis/ICD-10 Code:  Chronic bilateral low back pain without sciatica [M54.50, G89.29]  Referring practitioner: Andrzej Cardona, *  Date of Initial Visit: Type: THERAPY  Noted: 3/27/2024  Today's Date: 2024  Patient seen for 55 sessions           Subjective   Lj Crenshaw reports: the insides of his legs were a little sore from last session.      Objective     See Exercise, Manual, and Modality Logs for complete treatment.     Assessment/Plan  Patient began to fatigue toward the end of the session and wasn't able to complete all exercises. Pt's step length was shorter today with ambulation. Will continue to progress patient per POC and patient tolerance to decrease the risk of falls and improve upon functional mobility with ADLs.    Progress per Plan of Care         Timed:  Manual Therapy:         mins  23583;  Therapeutic Exercise:    10     mins  08467;     Neuromuscular Reyna:    15    mins  08401;    Therapeutic Activity:     15     mins  61040;     Gait Training:           mins  34890;        Untimed:  Electrical Stimulation:         mins  64320 ( );  Mechanical Traction:         mins  23303;       Timed Treatment:   40   mins   Total Treatment:     40   mins      Electronically signed:     Josie Ingram PTA  Physical Therapist Assistant  [facility] License #: [id number]

## 2024-12-18 ENCOUNTER — TREATMENT (OUTPATIENT)
Dept: PHYSICAL THERAPY | Facility: CLINIC | Age: 74
End: 2024-12-18
Payer: MEDICARE

## 2024-12-18 DIAGNOSIS — R53.1 GENERALIZED WEAKNESS: ICD-10-CM

## 2024-12-18 DIAGNOSIS — M25.611 DECREASED ROM OF RIGHT SHOULDER: ICD-10-CM

## 2024-12-18 DIAGNOSIS — G89.29 CHRONIC BILATERAL LOW BACK PAIN WITHOUT SCIATICA: Primary | ICD-10-CM

## 2024-12-18 DIAGNOSIS — R26.89 BALANCE DISORDER: ICD-10-CM

## 2024-12-18 DIAGNOSIS — R26.9 GAIT DISTURBANCE: ICD-10-CM

## 2024-12-18 DIAGNOSIS — M54.50 CHRONIC BILATERAL LOW BACK PAIN WITHOUT SCIATICA: Primary | ICD-10-CM

## 2024-12-18 DIAGNOSIS — R26.89 BALANCE PROBLEM: ICD-10-CM

## 2024-12-18 DIAGNOSIS — R29.898 LEG WEAKNESS, BILATERAL: ICD-10-CM

## 2024-12-18 PROCEDURE — 97110 THERAPEUTIC EXERCISES: CPT

## 2024-12-18 PROCEDURE — 97530 THERAPEUTIC ACTIVITIES: CPT

## 2024-12-18 NOTE — PROGRESS NOTES
"   Physical Therapy Daily Treatment Note                      [facility] [address] [place] [address]    Patient: Lj Crenshaw   : 1950  Diagnosis/ICD-10 Code:  Chronic bilateral low back pain without sciatica [M54.50, G89.29]  Referring practitioner: Andrzej Cardona, *  Date of Initial Visit: Type: THERAPY  Noted: 3/27/2024  Today's Date: 2024  Patient seen for 56 sessions           Subjective   The patient reported that he is having an \"alright\" day. Not great. Pt reports that he is going to call the doctor that he saw in  because the R side LE is not any better than it was then.     Objective   See Exercise, Manual, and Modality Logs for complete treatment.     Assessment/Plan  Patient tolerated today's session well. Pt has severe difficulty with seated marching; he was only able to lift his feet a couple of inches off the ground. Pt also had difficulty performing a full LAQ today with both legs. Pt's legs were very fatigued after just about 10 minutes of standing exercise, and then he had to sit down. Pt continues to exhibit severe B lower extremity weakness and has great difficulty/intermittent inability to perform a STS without the use of his B UE. Therapist thinks there must be some underlying neurological condition that doctors have not identified yet. Will continue to progress patient per POC and patient tolerance to decrease the risk of falls and improve upon functional mobility with ADLs. Continue POC.        Timed:  Manual Therapy:    0     mins  24494;  Therapeutic Exercise:    12     mins  61747;     Neuromuscular Reyna:   0    mins  58886;    Therapeutic Activity:     31     mins  48718;     Gait Trainin     mins  28653;     Aquatics                         0      mins  36652    Un-timed:  Mechanical Traction      0     mins  77349  Dry Needling     0     mins self-pay  Electrical Stimulation:    0     mins  79186 ( );      Timed " Treatment:   43   mins   Total Treatment:     43   mins    Susan Shelby PT    Electronically signed 12/18/2024    [place] License: PT - [id number]

## 2024-12-20 ENCOUNTER — TREATMENT (OUTPATIENT)
Dept: PHYSICAL THERAPY | Facility: CLINIC | Age: 74
End: 2024-12-20
Payer: MEDICARE

## 2024-12-20 DIAGNOSIS — M54.50 CHRONIC BILATERAL LOW BACK PAIN WITHOUT SCIATICA: Primary | ICD-10-CM

## 2024-12-20 DIAGNOSIS — R26.89 BALANCE PROBLEM: ICD-10-CM

## 2024-12-20 DIAGNOSIS — R26.89 BALANCE DISORDER: ICD-10-CM

## 2024-12-20 DIAGNOSIS — R29.898 LEG WEAKNESS, BILATERAL: ICD-10-CM

## 2024-12-20 DIAGNOSIS — M25.611 DECREASED ROM OF RIGHT SHOULDER: ICD-10-CM

## 2024-12-20 DIAGNOSIS — R53.1 GENERALIZED WEAKNESS: ICD-10-CM

## 2024-12-20 DIAGNOSIS — G89.29 CHRONIC BILATERAL LOW BACK PAIN WITHOUT SCIATICA: Primary | ICD-10-CM

## 2024-12-20 DIAGNOSIS — R26.9 GAIT DISTURBANCE: ICD-10-CM

## 2024-12-20 PROCEDURE — 97110 THERAPEUTIC EXERCISES: CPT

## 2024-12-20 PROCEDURE — 97530 THERAPEUTIC ACTIVITIES: CPT

## 2024-12-20 NOTE — PROGRESS NOTES
"   Physical Therapy Daily Treatment Note                      [facility] [address] [place] [address]    Patient: Lj Crenshaw   : 1950  Diagnosis/ICD-10 Code:  Chronic bilateral low back pain without sciatica [M54.50, G89.29]  Referring practitioner: Andrzej Cardona, *  Date of Initial Visit: Type: THERAPY  Noted: 3/27/2024  Today's Date: 2024  Patient seen for 57 sessions           Subjective   The patient reported that he is doing \"alright' today. Pt reports that his R LE tends to ER when he lies down.     Objective   See Exercise, Manual, and Modality Logs for complete treatment.     Assessment/Plan  Patient tolerated today's session well. Pt did have some knee pain after 3 way hip exercise today and then had to take a sitting rest break. Pt continues to have severe difficulty with any activity that requires hip flexion. Pt continues to exhibit severe B lower extremity weakness and has great difficulty/intermittent inability to perform a STS without the use of his B UE. Pt had to use significant upper extremity support to get himself off the chest press machine, even with the seat at its highest level. Therapist thinks there must be some underlying neurological condition that doctors have not identified yet. Further POC to be determined at next visit, which mick progress note and pt's last scheduled therapy visit of .          Timed:  Manual Therapy:    0     mins  14800;  Therapeutic Exercise:    23     mins  51063;     Neuromuscular Reyna:   0    mins  92143;    Therapeutic Activity:     17     mins  98878;     Gait Trainin     mins  11829;     Aquatics                         0      mins  60146    Un-timed:  Mechanical Traction      0     mins  52987  Dry Needling     0     mins self-pay  Electrical Stimulation:    0     mins  16179 ( );      Timed Treatment:   40   mins   Total Treatment:     40   mins    Susan Shelby PT    Electronically " signed 12/20/2024    [place] License: PT - [id number]

## 2024-12-27 ENCOUNTER — TREATMENT (OUTPATIENT)
Dept: PHYSICAL THERAPY | Facility: CLINIC | Age: 74
End: 2024-12-27
Payer: MEDICARE

## 2024-12-27 DIAGNOSIS — M54.50 CHRONIC BILATERAL LOW BACK PAIN WITHOUT SCIATICA: Primary | ICD-10-CM

## 2024-12-27 DIAGNOSIS — G89.29 CHRONIC BILATERAL LOW BACK PAIN WITHOUT SCIATICA: Primary | ICD-10-CM

## 2024-12-27 DIAGNOSIS — R29.898 LEG WEAKNESS, BILATERAL: ICD-10-CM

## 2024-12-27 DIAGNOSIS — R53.1 GENERALIZED WEAKNESS: ICD-10-CM

## 2024-12-27 DIAGNOSIS — R26.89 BALANCE DISORDER: ICD-10-CM

## 2024-12-27 DIAGNOSIS — R26.89 BALANCE PROBLEM: ICD-10-CM

## 2024-12-27 DIAGNOSIS — R26.9 GAIT DISTURBANCE: ICD-10-CM

## 2024-12-27 DIAGNOSIS — M25.611 DECREASED ROM OF RIGHT SHOULDER: ICD-10-CM

## 2024-12-27 PROCEDURE — 97530 THERAPEUTIC ACTIVITIES: CPT

## 2024-12-27 NOTE — PROGRESS NOTES
Progress Note / Discharge   [facility] [address] [place] [address]      Patient: Lj Crenshaw   : 1950  Diagnosis/ICD-10 Code:  Chronic bilateral low back pain without sciatica [M54.50, G89.29]  Referring practitioner: Andrzej Cardona, *  Date of Initial Visit: Type: THERAPY  Noted: 3/27/2024  Today's Date: 2024  Patient seen for 58 sessions      Subjective:   Subjective Questionnaire: Oswestry:  (26.67% disability)   Clinical Progress: unchanged  Home Program Compliance: No  Treatment has included: therapeutic exercise, neuromuscular re-education, manual therapy, therapeutic activity, and gait training    30 second STS Test: 4 full stand ups where hands left table (decreased control on descent) (23.5 inch table)     TU.15 seconds with use of rolator and B AFO's (normal height chair with airex in seat)      6 minute walk test: 489.1 feet with use of rolator and B AFO's     Subjective   Pt reports that he is moving slowly but surely today. Pt last saw neurologist in . Pt has had nerve conduction study done. Pt denies numbness and tingling in the hands. Pt sees neurologist again in about one month.    Objective   Strength/Myotome Testing      Left Shoulder      Planes of Motion   Flexion: 5   Abduction: 4+      Isolated Muscles   Biceps: 5      Right Shoulder      Planes of Motion   Flexion: 4+   Abduction: 5      Isolated Muscles   Biceps: 5          Strength     Left Hip   Planes of Motion   Flexion: 2+  Abduction: 5 (sitting)  Adduction: 2-     Right Hip   Planes of Motion   Flexion: 2+  Abduction: 5 (sitting)  Adduction: 2-     Left Knee   Flexion: 4-  Extension: 4+     Right Knee   Flexion: 4  Extension: 4-     Left Ankle/Foot   Dorsiflexion: 1     Right Ankle/Foot   Dorsiflexion: 1    See Exercise, Manual, and Modality Logs for complete treatment.     Assessment/Plan  Pt tolerated today's session well. Today was reassessment day. Compared to last  progress note, pt's function has not changed. Functional abilities and strength actually seem to be declining. Score on the TUG test has not improved. Score on the 6-minute walk test actually decreased today. Pt still experiencing symptoms of severe LE weakness B to where he can barely perform seated marching. Pt continues to have severe weakness of B ankle and continued to experience foot drop without his AFO's. Pt has severe difficulty performing STS even from an elevated surface. Therapist told pt to inquire to his neurologist about progressive neurological conditions, such as ALS and MS, as people do not usually experience B foot drop without cause. Weakness seems to be ascending, due to his foot drop starting first and then the weakness progressively working its way up the LE's. Pt's arms still have good strength B. Therapist has determined that pt is unlikely to experience any more gains from therapy, as it seems that he has plateaued. Therapist gave pt an extensive HEP that includes most of the exercises he usually performs in this clinic. Therapist encouraged pt to seek further testing from neurologist. Pt will be discharged at this time due to maximal benefit achieved from therapy.          Goals  Plan Goals:      1. Mobility: Walking/Moving Around Functional Limitation                               LTG 1: 12 weeks:  The patient will demonstrate TUG score <16 seconds and perform 9 STS in 30 seconds for reduced fall risk.                           STATUS:  NOT MET              STG 1a: 6 weeks:  The patient will demonstrate TUG score <18 seconds  and perform 7 STS in 30 seconds for reduced fall risk.                           STATUS:  NOT MET        2. The patient has limited strength of the B shoulders and B hips.              LTG 2: 12 weeks:  The patient will demonstrate 4+ /5 strength for B shoulder flexion, abduction, external rotation, and internal rotation in order to demonstrate improved shoulder  stability.                          STATUS:  MET              LTG 2a: 12 weeks:  The patient will demonstrate 4+/5 strength for B hip flexion, abduction, and extension in order to improve hip stability.                          STATUS:  NOT MET                3. The patient has difficulty with balance.               LTG 3: 12 weeks: The patient will hold the romberg position with eyes open for 20 seconds in order to improve balance and decrease risk of falling.                           STATUS: NOT MET              STG 3: 6 weeks: The patient will hold the romberg position with eyes open for 10 seconds in order to improve balance and decrease risk of falling.                           STATUS: MET     4. The patient has difficulty with endurance.               LTG 4: 12 weeks: The patient will be able to ambulate 750 with rollator in 6 minute walk test to decrease his risk for falling in community outings.               STATUS: NOT MET      Progress toward previous goals: Partially Met      Recommendations: Discharge      Signature: Susan Shelby PT    Electronically signed 2024    [place] License: PT - [id number]      Timed:  Manual Therapy:    0     mins  78741;  Therapeutic Exercise:    0     mins  37989;     Neuromuscular Reyna:    0    mins  60926;    Therapeutic Activity:     45     mins  90467;     Gait Trainin     mins  21141;     Aquatics                         0      mins  25960    Un-timed:  Mechanical Traction      0     mins  56297  Dry Needling     0     mins self-pay  Electrical Stimulation:    0     mins  22235 ( );    Timed Treatment:   45   mins   Total Treatment:     45   mins

## 2025-01-21 ENCOUNTER — TELEPHONE (OUTPATIENT)
Dept: FAMILY MEDICINE CLINIC | Facility: CLINIC | Age: 75
End: 2025-01-21
Payer: MEDICARE

## 2025-01-21 NOTE — TELEPHONE ENCOUNTER
"Caller: Lj Crenshaw \"Poli\"    Relationship: Self    Best call back number: [phone]     What is the best time to reach you: ANY    Who are you requesting to speak with (clinical staff, provider,  specific staff member): CLINICAL STAFF    What was the call regarding: PATIENT CALLED STATING THAT HE WOULD LIKE DR PORTILLO TO ALLOW MORE VISITS WITH [facility]  "

## 2025-01-23 DIAGNOSIS — M54.9 CHRONIC BACK PAIN, UNSPECIFIED BACK LOCATION, UNSPECIFIED BACK PAIN LATERALITY: ICD-10-CM

## 2025-01-23 DIAGNOSIS — G89.29 CHRONIC BACK PAIN, UNSPECIFIED BACK LOCATION, UNSPECIFIED BACK PAIN LATERALITY: ICD-10-CM

## 2025-01-23 DIAGNOSIS — R29.898 WEAKNESS OF BOTH LOWER EXTREMITIES: ICD-10-CM

## 2025-01-23 DIAGNOSIS — R26.89 BALANCE DISORDER: Primary | ICD-10-CM

## 2025-01-31 ENCOUNTER — TREATMENT (OUTPATIENT)
Dept: PHYSICAL THERAPY | Facility: CLINIC | Age: 75
End: 2025-01-31
Payer: MEDICARE

## 2025-01-31 DIAGNOSIS — R26.89 BALANCE DISORDER: Primary | ICD-10-CM

## 2025-01-31 DIAGNOSIS — R29.898 WEAKNESS OF BOTH LOWER EXTREMITIES: ICD-10-CM

## 2025-01-31 NOTE — PROGRESS NOTES
Physical Therapy Initial Evaluation and Plan of Care                    [facility] [address] [place] [address]    Patient: Lj Crenshaw   : 1950  Diagnosis/ICD-10 Code:  Balance disorder [R26.89]  Referring practitioner: Andrzej Cardona, *  Date of Initial Visit: 2025  Today's Date: 2025  Patient seen for 1 sessions           Subjective Questionnaire: Oswestry: 18/45 (40% disability)     Subjective Evaluation    History of Present Illness  Mechanism of injury: The patient presents to physical therapy with complaints of decreased balance and weakness. He has a history of right total knee replacement from . He was able to walk on level ground without an assistive device after surgery, but used a cane for walking up and downhill. Eventually, walking with a cane became more challenging and he started to use a rollator walker. He also has bilateral drop foot and wears bilateral AFOs for ambulation. He can balance at home bare feet, but uses the AFOs for walking in public. He has seen neurology, but they haven't discovered the cause of the onset of his weakness.    Pain  Current pain ratin    Patient Goals  Patient goals for therapy: increased strength, improved balance and independence with ADLs/IADLs           Past Medical History:   Diagnosis Date    Hyperlipidemia       Past Surgical History:   Procedure Laterality Date    EYE SURGERY      KNEE SURGERY         Objective          Functional Assessment     Comments   TU.6 seconds with use of rolator and B AFO's (normal height chair with airex in seat)      6 minute walk test: 477.1 feet with use of rolator and B AFO's         Strength/Myotome Testing      Left Shoulder      Planes of Motion   Flexion: 5   Abduction: 4+      Isolated Muscles   Biceps: 5      Right Shoulder      Planes of Motion   Flexion: 4+   Abduction: 5      Isolated Muscles   Biceps: 5          Strength     Left Hip   Planes of Motion    Flexion: 2+  Abduction: 3+ (supine)  Adduction: 2-     Right Hip   Planes of Motion   Flexion: 2+  Abduction: 5 (supine)  Adduction: 2-     Left Knee   Flexion: 4-  Extension: 4     Right Knee   Flexion: 4-  Extension: 3+     Left Ankle/Foot   Dorsiflexion: 0     Right Ankle/Foot   Dorsiflexion: 0        Balance Testing:  Feet apart EO: 19 seconds (B AFO's)  Feet together EO: 4 seconds (B AFO's)    See Exercise, Manual, and Modality Logs for complete treatment.     Assessment & Plan       Assessment  Impairments: abnormal gait, abnormal muscle firing, abnormal or restricted ROM, activity intolerance, impaired balance, impaired physical strength, lacks appropriate home exercise program, pain with function, safety issue and weight-bearing intolerance   Functional limitations: walking, uncomfortable because of pain, moving in bed, standing and stooping   Assessment details: The patient presents to physical therapy with complaints of balance deficits and lower extremity weakness. His signs/symptoms appear consistent with a neuromuscular condition based on the onset and lack of GAURAV, However, he hasn't been formally diagnosed with any specific condition. He would benefit from skilled physical therapy as described below to address these limitations and allow the patient to return to his prior level of function. If symptoms don't improve over the next month, he would be referred to neurology for a second opinion.  Prognosis: good    Goals  Plan Goals:    1. Mobility: Walking/Moving Around Functional Limitation                               LTG 1: 12 weeks:  The patient will demonstrate TUG score <16 seconds for reduced fall risk.                           STATUS:  New              STG 1a: 6 weeks:  The patient will demonstrate TUG score <20 seconds for reduced fall risk.                           STATUS:  New        2. The patient has limited strength of the B hips.              LTG 2: 12 weeks:  The patient will  demonstrate 4 /5 strength for hip flexion, abduction, external rotation, and internal rotation in order to demonstrate improved shoulder stability.                          STATUS: New              STG 2a: 6 weeks:  The patient will demonstrate 4-/5 strength for B hip flexion, abduction, and extension in order to improve hip stability.                          STATUS:  New                3. The patient has difficulty with balance.               LTG 3: 12 weeks: The patient will hold the romberg position with eyes open for 20 seconds in order to improve balance and decrease risk of falling.                           STATUS: New              STG 3a: 6 weeks: The patient will hold the romberg position with eyes open for 10 seconds in order to improve balance and decrease risk of falling.                           STATUS: New     4. The patient has difficulty with endurance.               LTG 4: 12 weeks: The patient will be able to ambulate 750 with rollator in 6 minute walk test to decrease his risk for falling in community outings.                STATUS: New    STG 4a: 6 weeks: The patient will be able to ambulate 600 with rollator in 6 minute walk test to decrease his risk for falling in community outings.                STATUS: New      Plan  Therapy options: will be seen for skilled therapy services  Planned modality interventions: cryotherapy, electrical stimulation/Russian stimulation and TENS  Planned therapy interventions: balance/weight-bearing training, ADL retraining, soft tissue mobilization, strengthening, stretching, therapeutic activities, joint mobilization, home exercise program, gait training, functional ROM exercises, flexibility, body mechanics training, postural training, neuromuscular re-education and manual therapy  Frequency: 2x week  Duration in weeks: 12  Treatment plan discussed with: patient        Visit Diagnoses:    ICD-10-CM ICD-9-CM   1. Balance disorder  R26.89 781.99   2. Weakness of  both lower extremities  R29.898 729.89       History # of Personal Factors and/or Comorbidities: MODERATE (1-2)  Examination of Body System(s): # of elements: MODERATE (3)  Clinical Presentation: EVOLVING  Clinical Decision Making: MODERATE      Timed:         Manual Therapy:    0     mins  17214;     Therapeutic Exercise:    10     mins  12549;     Neuromuscular Reyna:    0    mins  23684;    Therapeutic Activity:     0     mins  09958;     Gait Trainin     mins  16035;     Ultrasound:     0     mins  45116;    Ionto                               0    mins   37849  Self Care                       0     mins   06636  Canalith Repos    0     mins 89718      Un-Timed:  Electrical Stimulation:    0     mins  26597 ( );  Dry Needling     0     mins self-pay  Traction     0     mins 39477  Low Eval     0     Mins  60906  Mod Eval     30     Mins  73240  High Eval                       0     Mins  56189  Re-Eval                           0    mins  55799    Timed Treatment:   10   mins   Total Treatment:     40   mins    PT SIGNATURE: Judah Dinh PT    Electronically signed 2025    [place] License: PT - [id number]      Initial Certification  Certification Period: 2025 thru 2025  I certify that the therapy services are furnished while this patient is under my care.  The services outlined above are required by this patient, and will be reviewed every 90 days.     PHYSICIAN: Andrzej Cardona MD  NPI: [id number]      DATE:     Please sign and return via fax to [phone]. Thank you, [facility].

## 2025-02-03 ENCOUNTER — TREATMENT (OUTPATIENT)
Dept: PHYSICAL THERAPY | Facility: CLINIC | Age: 75
End: 2025-02-03
Payer: MEDICARE

## 2025-02-03 DIAGNOSIS — R26.89 BALANCE DISORDER: Primary | ICD-10-CM

## 2025-02-03 DIAGNOSIS — R29.898 WEAKNESS OF BOTH LOWER EXTREMITIES: ICD-10-CM

## 2025-02-03 PROCEDURE — 97112 NEUROMUSCULAR REEDUCATION: CPT | Performed by: PHYSICAL THERAPIST

## 2025-02-03 PROCEDURE — 97530 THERAPEUTIC ACTIVITIES: CPT | Performed by: PHYSICAL THERAPIST

## 2025-02-03 NOTE — PROGRESS NOTES
Physical Therapy Daily Treatment Note                      [facility] [address] [place] [address]    Patient: Lj Crenshaw   : 1950  Diagnosis/ICD-10 Code:  Balance disorder [R26.89]  Referring practitioner: Andrzej Cardona, *  Date of Initial Visit: Type: THERAPY  Noted: 2025  Today's Date: 2/3/2025  Patient seen for 2 sessions           Subjective   The patient reported no new complaints today. He has been compliant with his new HEP.    Objective   See Exercise, Manual, and Modality Logs for complete treatment.     Assessment/Plan    The patient demonstrated good tolerance to all functional lower extremity strengthening and balance training exercise. Continue to progress with current PT plan of care per patient tolerance.         Timed:  Manual Therapy:    0     mins  61035;  Therapeutic Exercise:    8     mins  78700;     Neuromuscular Reyna:   8    mins  37102;    Therapeutic Activity:     14     mins  26680;     Gait Trainin     mins  93088;     Aquatics                         0      mins  52087    Un-timed:  Mechanical Traction      0     mins  71830  Dry Needling     0     mins self-pay  Electrical Stimulation:    0     mins  42618 ( );      Timed Treatment:   30   mins   Total Treatment:     30   mins    Judah Dinh PT  Physical Therapist    Electronically signed 2/3/2025    [place] License: PT - [id number]

## 2025-02-07 ENCOUNTER — TREATMENT (OUTPATIENT)
Dept: PHYSICAL THERAPY | Facility: CLINIC | Age: 75
End: 2025-02-07
Payer: MEDICARE

## 2025-02-07 DIAGNOSIS — R29.898 WEAKNESS OF BOTH LOWER EXTREMITIES: ICD-10-CM

## 2025-02-07 DIAGNOSIS — R26.89 BALANCE DISORDER: Primary | ICD-10-CM

## 2025-02-07 NOTE — PROGRESS NOTES
Outpatient Physical Therapy  [address] [facility], [place] [address]                            Physical Therapy Daily Treatment Note    Patient: Lj Crenshaw   : 1950  Diagnosis/ICD-10 Code:  Balance disorder [R26.89]  Referring practitioner: Andrzej Cardona, *  Date of Initial Visit: Type: THERAPY  Noted: 2025  Today's Date: 2025  Patient seen for 3 sessions           Subjective   Lj Crenshaw reports: that the majority of the walking that he is doing is going to his grand[employer] basketball games. He is still doing exercises for his legs and arms at home.     Objective   General fatigue.     See Exercise, Manual, and Modality Logs for complete treatment.     Assessment/Plan  Lj progressing as evident by increased tolerance of PT session. Pt required Stand by Assist for Safety throughout PT session, general fatigue. Pt would benefit from skilled PT to address strength and endurance deficits, transfer and gait training and any concerns with ADLs.       Progress per Plan of Care         Timed:  Manual Therapy:        mins  77058;  Therapeutic Exercise:    15     mins  08023;     Neuromuscular Reyna:    15    mins  58979;    Therapeutic Activity:     8     mins  83775;     Gait Training:           mins  34448;        Timed Treatment:   38   mins   Total Treatment:     38   mins      Electronically signed:   Trudy Mckay PTA  Physical Therapist Assistant  [facility] License #: [id number]

## 2025-02-10 ENCOUNTER — OFFICE VISIT (OUTPATIENT)
Dept: FAMILY MEDICINE CLINIC | Facility: CLINIC | Age: 75
End: 2025-02-10
Payer: MEDICARE

## 2025-02-10 ENCOUNTER — TREATMENT (OUTPATIENT)
Dept: PHYSICAL THERAPY | Facility: CLINIC | Age: 75
End: 2025-02-10
Payer: MEDICARE

## 2025-02-10 VITALS
HEIGHT: 78 IN | WEIGHT: 271 LBS | BODY MASS INDEX: 31.35 KG/M2 | OXYGEN SATURATION: 94 % | DIASTOLIC BLOOD PRESSURE: 68 MMHG | SYSTOLIC BLOOD PRESSURE: 107 MMHG | TEMPERATURE: 98.6 F | HEART RATE: 109 BPM

## 2025-02-10 DIAGNOSIS — R29.898 WEAKNESS OF BOTH LOWER EXTREMITIES: Primary | ICD-10-CM

## 2025-02-10 DIAGNOSIS — I10 ESSENTIAL HYPERTENSION: Primary | ICD-10-CM

## 2025-02-10 DIAGNOSIS — R26.89 IMBALANCE: ICD-10-CM

## 2025-02-10 DIAGNOSIS — R26.89 BALANCE DISORDER: ICD-10-CM

## 2025-02-10 DIAGNOSIS — M54.10 POLYRADICULOPATHY: ICD-10-CM

## 2025-02-10 DIAGNOSIS — E78.2 MIXED HYPERLIPIDEMIA: ICD-10-CM

## 2025-02-10 PROCEDURE — 97530 THERAPEUTIC ACTIVITIES: CPT | Performed by: PHYSICAL THERAPIST

## 2025-02-10 PROCEDURE — 3074F SYST BP LT 130 MM HG: CPT | Performed by: STUDENT IN AN ORGANIZED HEALTH CARE EDUCATION/TRAINING PROGRAM

## 2025-02-10 PROCEDURE — 1159F MED LIST DOCD IN RCRD: CPT | Performed by: STUDENT IN AN ORGANIZED HEALTH CARE EDUCATION/TRAINING PROGRAM

## 2025-02-10 PROCEDURE — 97112 NEUROMUSCULAR REEDUCATION: CPT | Performed by: PHYSICAL THERAPIST

## 2025-02-10 PROCEDURE — 99214 OFFICE O/P EST MOD 30 MIN: CPT | Performed by: STUDENT IN AN ORGANIZED HEALTH CARE EDUCATION/TRAINING PROGRAM

## 2025-02-10 PROCEDURE — 1160F RVW MEDS BY RX/DR IN RCRD: CPT | Performed by: STUDENT IN AN ORGANIZED HEALTH CARE EDUCATION/TRAINING PROGRAM

## 2025-02-10 PROCEDURE — 97110 THERAPEUTIC EXERCISES: CPT | Performed by: PHYSICAL THERAPIST

## 2025-02-10 PROCEDURE — G2211 COMPLEX E/M VISIT ADD ON: HCPCS | Performed by: STUDENT IN AN ORGANIZED HEALTH CARE EDUCATION/TRAINING PROGRAM

## 2025-02-10 PROCEDURE — 3078F DIAST BP <80 MM HG: CPT | Performed by: STUDENT IN AN ORGANIZED HEALTH CARE EDUCATION/TRAINING PROGRAM

## 2025-02-10 NOTE — PROGRESS NOTES
"Chief Complaint  Leg Pain (LEG PAIN AND DISCOLORATION)    Subjective      Lj Crenshaw is a 74 y.o. male who presents to [facility]     Patient comes with cc of legs changing color. Explained that is printing due to venous stasis. Encourage to use compression stockings and keep moving. Pt might benefit from occupational therapy. He is over all steady. Will give referral. BP is steady. And we will get lipid panel next visit.   Objective   Vital Signs:   Vitals:    02/10/25 1410   BP: 107/68   Pulse: 109   Temp: 98.6 °F (37 °C)   TempSrc: Temporal   SpO2: 94%   Weight: 123 kg (271 lb)   Height: 198.1 cm (77.99\")     Body mass index is 31.32 kg/m².    Wt Readings from Last 3 Encounters:   02/10/25 123 kg (271 lb)   10/10/24 123 kg (271 lb)   09/06/24 124 kg (274 lb 6.4 oz)     BP Readings from Last 3 Encounters:   02/10/25 107/68   10/10/24 104/62   09/06/24 129/71       Health Maintenance   Topic Date Due    COVID-19 Vaccine (3 - 2024-25 season) 09/01/2024    ANNUAL WELLNESS VISIT  05/06/2025    INFLUENZA VACCINE  03/31/2025 (Originally 7/1/2024)    LIPID PANEL  07/01/2025    BMI FOLLOWUP  01/23/2026    COLORECTAL CANCER SCREENING  07/11/2027    TDAP/TD VACCINES (2 - Td or Tdap) 09/18/2033    HEPATITIS C SCREENING  Completed    Pneumococcal Vaccine 65+  Completed    AAA SCREEN ONCE  Completed    ZOSTER VACCINE  Completed       Physical Exam  Vitals reviewed.   Constitutional:       Comments: Walks with a walker    HENT:      Head: Normocephalic.      Mouth/Throat:      Mouth: Mucous membranes are moist.   Eyes:      Pupils: Pupils are equal, round, and reactive to light.   Cardiovascular:      Rate and Rhythm: Normal rate.   Abdominal:      General: Abdomen is flat.   Musculoskeletal:         General: Normal range of motion.      Cervical back: Normal range of motion.   Skin:     General: Skin is warm.      Capillary Refill: Capillary refill takes less than 2 seconds. "   Neurological:      Mental Status: He is alert.            Assessment & Plan  Essential hypertension  Hypertension is stable and controlled  Continue current treatment regimen.  Blood pressure will be reassessed in 6 months.         Mixed hyperlipidemia   Lipid abnormalities are stable    Plan:  Continue same medication/s without change.      Discussed medication dosage, use, side effects, and goals of treatment in detail.    Counseled patient on lifestyle modifications to help control hyperlipidemia.     Patient Treatment Goals:   LDL goal is less than 70    Followup in 6 months.         Polyradiculopathy    Orders:    Ambulatory Referral to Occupational Therapy for Evaluation & Treatment    Imbalance    Orders:    Ambulatory Referral to Occupational Therapy for Evaluation & Treatment               I spent 25 minutes caring for Lj on this date of service. This time includes time spent by me in the following activities:preparing for the visit, reviewing tests, obtaining and/or reviewing a separately obtained history, performing a medically appropriate examination and/or evaluation, counseling and educating the patient/family/caregiver, ordering medications, tests, or procedures, referring and communicating with other health care professionals, documenting information in the medical record, independently interpreting results and communicating that information with the patient/family/caregiver, care coordination.    FOLLOW UP  Return in about 6 months (around 8/10/2025).  Patient was given instructions and counseling regarding his condition or for health maintenance advice. Please see specific information pulled into the AVS if appropriate.       Andrzej Ledesma MD  02/10/25  15:03 EST    CURRENT & DISCONTINUED MEDICATIONS  Current Outpatient Medications   Medication Instructions    Cyanocobalamin 1000 MCG sublingual tablet 1 tablet, Sublingual, Daily    ketorolac (ACULAR) 0.5 % ophthalmic solution  No dose, route, or frequency recorded.    lisinopril-hydrochlorothiazide (PRINZIDE,ZESTORETIC) 10-12.5 MG per tablet 1 tablet, Oral, Daily    prednisoLONE acetate (PRED FORTE) 1 % ophthalmic suspension 1 drop, 4 Times Daily    rosuvastatin (CRESTOR) 40 mg, Oral, Daily, generic       There are no discontinued medications.

## 2025-02-10 NOTE — PROGRESS NOTES
[facility]                              Physical Therapy Daily Treatment Note    Patient: Lj Crenshaw   : 1950  Diagnosis/ICD-10 Code:  Weakness of both lower extremities [R29.898]  Referring practitioner: Andrzej Cardona, *  Date of Initial Visit: Type: THERAPY  Noted: 2025  Today's Date: 2/10/2025  Patient seen for 4 sessions           Subjective   Lj Crenshaw reports: no new complaints at time of arrival. Pt states he has a follow up with his PCP today after therapy.      Objective     See Exercise, Manual, and Modality Logs for complete treatment.     Assessment/Plan  Patient tolerated strengthening exercises well today without complaints of discomfort. Pt did require seated rest breaks in between exercises and activities due to fatigue. Patient will continue to benefit from skilled PT services in order to address strength and balance deficits in order to improve upon functional daily activities safely.    Progress per Plan of Care         Timed:  Manual Therapy:         mins  45276;  Therapeutic Exercise:    10     mins  81637;     Neuromuscular Reyna:    15    mins  50586;    Therapeutic Activity:     15     mins  52723;     Gait Training:           mins  61171;        Untimed:  Electrical Stimulation:         mins  97190 ( );  Mechanical Traction:         mins  60300;       Timed Treatment:   40   mins   Total Treatment:     40   mins      Electronically signed:     Josie Ingram PTA  Physical Therapist Assistant  [facility] License #: [id number]

## 2025-02-13 ENCOUNTER — TREATMENT (OUTPATIENT)
Dept: PHYSICAL THERAPY | Facility: CLINIC | Age: 75
End: 2025-02-13
Payer: MEDICARE

## 2025-02-13 DIAGNOSIS — R26.89 BALANCE DISORDER: ICD-10-CM

## 2025-02-13 DIAGNOSIS — R29.898 WEAKNESS OF BOTH LOWER EXTREMITIES: Primary | ICD-10-CM

## 2025-02-13 NOTE — PROGRESS NOTES
[facility]                              Physical Therapy Daily Treatment Note    Patient: Lj Crenshaw   : 1950  Diagnosis/ICD-10 Code:  Weakness of both lower extremities [R29.898]  Referring practitioner: Andrzej Cardona, *  Date of Initial Visit: Type: THERAPY  Noted: 2025  Today's Date: 2025  Patient seen for 5 sessions           Subjective   Lj Crenshaw reports: he was a little sore after last session. He reports his legs get tired easily. Pt also stated towards the end of the session he was getting an electric wheel chair delivered tomorrow. Pt didn't state if he was planning on using it or not.      Objective   General fatigue towards end of session    See Exercise, Manual, and Modality Logs for complete treatment.     Assessment/Plan  Lj tolerated strengthening exercises with seated rest breaks due to fatigue.  Patient will continue to benefit from skilled PT services in order to address strength and balance deficits in order to improve upon functional daily activities safely.         Progress per Plan of Care         Timed:  Manual Therapy:         mins  60550;  Therapeutic Exercise:    15     mins  58518;     Neuromuscular Reyna:    10    mins  23342;    Therapeutic Activity:     15     mins  41203;     Gait Training:           mins  74392;        Untimed:  Electrical Stimulation:         mins  09704 ( );  Mechanical Traction:         mins  55789;       Timed Treatment:   40   mins   Total Treatment:     40   mins      Electronically signed:     Josie Ingram PTA  Physical Therapist Assistant  [facility] License #: [id number]

## 2025-02-17 ENCOUNTER — TREATMENT (OUTPATIENT)
Dept: PHYSICAL THERAPY | Facility: CLINIC | Age: 75
End: 2025-02-17
Payer: MEDICARE

## 2025-02-17 ENCOUNTER — TELEPHONE (OUTPATIENT)
Dept: FAMILY MEDICINE CLINIC | Facility: CLINIC | Age: 75
End: 2025-02-17
Payer: MEDICARE

## 2025-02-17 DIAGNOSIS — M54.50 LOW BACK PAIN, UNSPECIFIED BACK PAIN LATERALITY, UNSPECIFIED CHRONICITY, UNSPECIFIED WHETHER SCIATICA PRESENT: ICD-10-CM

## 2025-02-17 DIAGNOSIS — M25.552 BILATERAL HIP PAIN: Primary | ICD-10-CM

## 2025-02-17 DIAGNOSIS — R26.89 BALANCE DISORDER: ICD-10-CM

## 2025-02-17 DIAGNOSIS — M25.551 BILATERAL HIP PAIN: Primary | ICD-10-CM

## 2025-02-17 DIAGNOSIS — R29.898 WEAKNESS OF BOTH LOWER EXTREMITIES: Primary | ICD-10-CM

## 2025-02-17 PROCEDURE — 97116 GAIT TRAINING THERAPY: CPT | Performed by: PHYSICAL THERAPIST

## 2025-02-17 PROCEDURE — 97530 THERAPEUTIC ACTIVITIES: CPT | Performed by: PHYSICAL THERAPIST

## 2025-02-17 PROCEDURE — 97110 THERAPEUTIC EXERCISES: CPT | Performed by: PHYSICAL THERAPIST

## 2025-02-17 PROCEDURE — 97112 NEUROMUSCULAR REEDUCATION: CPT | Performed by: PHYSICAL THERAPIST

## 2025-02-17 NOTE — PROGRESS NOTES
Physical Therapy Daily Treatment Note                      [facility] [address] [place] [address]    Patient: Lj Crenshaw   : 1950  Diagnosis/ICD-10 Code:  Weakness of both lower extremities [R29.898]  Referring practitioner: Andrzej Cardona, *  Date of Initial Visit: Type: THERAPY  Noted: 2025  Today's Date: 2025  Patient seen for 6 sessions           Subjective   The patient reported no new complaints today. His right knee still bothers him more than the left. He was fatigued by the end of today's session.    Objective   See Exercise, Manual, and Modality Logs for complete treatment.     Assessment/Plan    The patient demonstrated good tolerance to a progression of exercise focused on strength and endurance. He was quite fatigued by the end of today's session. Continue to progress with current PT plan of care per patient tolerance.         Timed:  Manual Therapy:    0     mins  93979;  Therapeutic Exercise:    15     mins  61171;     Neuromuscular Reyna:   10    mins  35795;    Therapeutic Activity:     15     mins  91644;     Gait Training:      15     mins  13400;     Aquatics                         0      mins  69255    Un-timed:  Mechanical Traction      0     mins  95070  Dry Needling     0     mins self-pay  Electrical Stimulation:    0     mins  34453 ( );      Timed Treatment:   55   mins   Total Treatment:     55   mins    Judah Dinh PT  Physical Therapist    Electronically signed 2025    [place] License: PT - [id number]

## 2025-02-17 NOTE — TELEPHONE ENCOUNTER
Spoke with the patient he does not need to see a psychiatrist and also I informed him that Dr Herron is not [facility] he is [facility] and if still wanted the referral placed he said yes that is who the physical therapy had suggested.  Also states someone suppose to come to his home to tell him what manuelito of things he needs done.  I told him that this goes thru [facility] health not to [facility] they do not come to his home.

## 2025-02-17 NOTE — TELEPHONE ENCOUNTER
"    Caller: Lj Crenshaw \"Poli\"    Relationship: Self    Best call back number: [phone]     What is the medical concern/diagnosis: PHYCHIATRIST RECOMMENDED     What specialty or service is being requested: ORTHOPEDICS    What is the provider, practice or medical service name: [facility]    What is the office location: [facility]    What is the office phone number: [phone]    Any additional details: PATIENT STATES THAT HE WOULD LIKE A CALL BACK WHEN THIS IS SENT TO DISCUSS SOMETHING ELSE AS WELL      "

## 2025-02-18 DIAGNOSIS — R29.898 WEAKNESS OF BOTH LOWER EXTREMITIES: ICD-10-CM

## 2025-02-18 DIAGNOSIS — R26.89 BALANCE DISORDER: Primary | ICD-10-CM

## 2025-02-20 ENCOUNTER — TREATMENT (OUTPATIENT)
Dept: PHYSICAL THERAPY | Facility: CLINIC | Age: 75
End: 2025-02-20
Payer: MEDICARE

## 2025-02-20 DIAGNOSIS — R26.89 BALANCE DISORDER: ICD-10-CM

## 2025-02-20 DIAGNOSIS — R29.898 WEAKNESS OF BOTH LOWER EXTREMITIES: Primary | ICD-10-CM

## 2025-02-20 NOTE — PROGRESS NOTES
Outpatient Physical Therapy  [address] [facility], [place] [address]                            Physical Therapy Daily Treatment Note    Patient: Lj Crenshaw   : 1950  Diagnosis/ICD-10 Code:  Weakness of both lower extremities [R29.898]  Referring practitioner: Andrzej Cardona, *  Date of Initial Visit: Type: THERAPY  Noted: 2025  Today's Date: 2025  Patient seen for 7 sessions           Subjective   Lj Crenshaw reports: that his legs are doing alright, states that he isn't comfortable riding the bike when he has both of his AFOs on.     Objective   General fatigue with ambulation.    See Exercise, Manual, and Modality Logs for complete treatment.     Assessment/Plan  Lj progressing as evident by decreased falls and increased tolerance of PT session. Pt required Stand by Assist for Safety throughout PT session, general fatigue. Pt would benefit from skilled PT to address strength and endurance deficits, transfer and gait training and any concerns with ADLs.       Progress per Plan of Care         Timed:  Manual Therapy:        mins  39200;  Therapeutic Exercise:    15     mins  45903;     Neuromuscular Reyna:    10    mins  60597;    Therapeutic Activity:     20     mins  51592;     Gait Training:           mins  68083;        Timed Treatment:   45   mins   Total Treatment:     45   mins      Electronically signed:   Trudy Mckay PTA  Physical Therapist Assistant  [facility] License #: [id number]

## 2025-02-24 ENCOUNTER — TREATMENT (OUTPATIENT)
Dept: PHYSICAL THERAPY | Facility: CLINIC | Age: 75
End: 2025-02-24
Payer: MEDICARE

## 2025-02-24 DIAGNOSIS — R29.898 WEAKNESS OF BOTH LOWER EXTREMITIES: Primary | ICD-10-CM

## 2025-02-24 DIAGNOSIS — R26.89 BALANCE DISORDER: ICD-10-CM

## 2025-02-24 PROCEDURE — 97530 THERAPEUTIC ACTIVITIES: CPT | Performed by: PHYSICAL THERAPIST

## 2025-02-24 PROCEDURE — 97112 NEUROMUSCULAR REEDUCATION: CPT | Performed by: PHYSICAL THERAPIST

## 2025-02-24 PROCEDURE — 97110 THERAPEUTIC EXERCISES: CPT | Performed by: PHYSICAL THERAPIST

## 2025-02-24 NOTE — PROGRESS NOTES
Outpatient Physical Therapy  [address] [facility], [place] [address]                            Physical Therapy Daily Treatment Note    Patient: Lj Crenshaw   : 1950  Diagnosis/ICD-10 Code:  Weakness of both lower extremities [R29.898]  Referring practitioner: Andrzej Cardona, *  Date of Initial Visit: Type: THERAPY  Noted: 2025  Today's Date: 2025  Patient seen for 8 sessions           Subjective   jL Crenshaw reports: that he was standing up from the table last night and lost his balance before he could transition to his walker, states that he fell back into his chair.   He does have a motorized wheelchair at home, states that they were able to raise it up for him.     Objective   6 minute walk test: 643 feet    See Exercise, Manual, and Modality Logs for complete treatment.     Assessment/Plan  Lj progressing as evident by increased tolerance of PT session, although did lose his balance standing up last night. Pt required Stand by Assist for Safety throughout PT session, no LOB, general fatigue. Pt would benefit from skilled PT to address strength and endurance deficits, transfer and gait training and any concerns with ADLs.       Progress per Plan of Care         Timed:  Manual Therapy:         mins  06455;  Therapeutic Exercise:    15     mins  09933;     Neuromuscular Reyna:    15    mins  00125;    Therapeutic Activity:     15     mins  11408;     Gait Training:           mins  49175;      Untimed:  Electrical Stimulation:         mins  47865 ( );  Mechanical Traction:         mins  21853;     Timed Treatment:   45   mins   Total Treatment:     45   mins      Electronically signed:     Trudy Mckay PTA  Physical Therapist Assistant  [facility] License #: [id number]

## 2025-02-27 ENCOUNTER — TREATMENT (OUTPATIENT)
Dept: PHYSICAL THERAPY | Facility: CLINIC | Age: 75
End: 2025-02-27
Payer: MEDICARE

## 2025-02-27 DIAGNOSIS — R29.898 WEAKNESS OF BOTH LOWER EXTREMITIES: Primary | ICD-10-CM

## 2025-02-27 DIAGNOSIS — R26.89 BALANCE DISORDER: ICD-10-CM

## 2025-02-27 NOTE — PROGRESS NOTES
[facility]                              Physical Therapy Daily Treatment Note    Patient: Lj Crenshaw   : 1950  Diagnosis/ICD-10 Code:  Weakness of both lower extremities [R29.898]  Referring practitioner: Andrzej Cardona, *  Date of Initial Visit: Type: THERAPY  Noted: 2025  Today's Date: 2025  Patient seen for 9 sessions           Subjective   Lj Crenshaw reports: no new complaints at time of therapy. Pt states he is going to work hard over the weekend since his progress note will be on Monday 3/3.        Objective   Decreased step length with RLE.    See Exercise, Manual, and Modality Logs for complete treatment.     Assessment/Plan  Lj  tolerated exercises well, with no complaints of increased pain or discomfort, general fatigue toward end of session.  Patient does require SBA during standing exercises and ambulating in the clinic for safety. Patient will continue to benefit from skilled PT services in order to improve upon strength, endurance and balance deficits for safe ADLS.      Progress per Plan of Care         Timed:  Manual Therapy:         mins  20031;  Therapeutic Exercise:    10     mins  44120;     Neuromuscular Reyna:    15    mins  03067;    Therapeutic Activity:     15     mins  66959;     Gait Training:           mins  95588;        Untimed:  Electrical Stimulation:         mins  46608 ( );  Mechanical Traction:         mins  67742;       Timed Treatment:   40   mins   Total Treatment:     40   mins      Electronically signed:     Josie Ingram PTA  Physical Therapist Assistant  [facility] License #: [id number]

## 2025-03-03 ENCOUNTER — TREATMENT (OUTPATIENT)
Dept: PHYSICAL THERAPY | Facility: CLINIC | Age: 75
End: 2025-03-03
Payer: MEDICARE

## 2025-03-03 DIAGNOSIS — R26.89 BALANCE DISORDER: ICD-10-CM

## 2025-03-03 DIAGNOSIS — R29.898 WEAKNESS OF BOTH LOWER EXTREMITIES: Primary | ICD-10-CM

## 2025-03-03 PROCEDURE — 97112 NEUROMUSCULAR REEDUCATION: CPT | Performed by: PHYSICAL THERAPIST

## 2025-03-03 PROCEDURE — 97116 GAIT TRAINING THERAPY: CPT | Performed by: PHYSICAL THERAPIST

## 2025-03-03 PROCEDURE — 97530 THERAPEUTIC ACTIVITIES: CPT | Performed by: PHYSICAL THERAPIST

## 2025-03-03 PROCEDURE — 97110 THERAPEUTIC EXERCISES: CPT | Performed by: PHYSICAL THERAPIST

## 2025-03-03 NOTE — PROGRESS NOTES
Progress Note  [facility] [address] [place] [address]      Patient: Lj Crenshaw   : 1950  Diagnosis/ICD-10 Code:  Weakness of both lower extremities [R29.898]  Referring practitioner: Andrzej Cardona, *  Date of Initial Visit: Type: THERAPY  Noted: 2025  Today's Date: 3/3/2025  Patient seen for 10 sessions      Subjective:   Subjective Questionnaire: Oswestry: 13 (28.9% disability)   Clinical Progress: improved  Home Program Compliance: Yes  Treatment has included: therapeutic exercise, neuromuscular re-education, manual therapy, therapeutic activity, and gait training    Subjective   The patient reported that he has back pain when he first wakes up, but this is pretty typical. He has no pain as of the start of his PT session today. Over the past month, he has noticed improvements in his endurance with standing and walking. He can walk a longer distance before getting fatigued. He can tell that his leg strength has improved as it is easier for him to get his legs in bed.    Objective   TU.8 seconds with use of rolator and B AFO's (normal height chair with airex in seat)      6 minute walk test: 612 feet with use of rolator and B AFO's         Strength/Myotome Testing      Left Shoulder      Planes of Motion   Flexion: 5   Abduction: 4+      Isolated Muscles   Biceps: 5      Right Shoulder      Planes of Motion   Flexion: 4+   Abduction: 5      Isolated Muscles   Biceps: 5          Strength     Left Hip   Planes of Motion   Flexion: 2+  Abduction: 4 (supine)  Adduction: 2-     Right Hip   Planes of Motion   Flexion: 2+  Abduction: 4 (supine)  Adduction: 2-     Left Knee   Flexion: 4-  Extension: 4     Right Knee   Flexion: 4-  Extension: 4-     Left Ankle/Foot   Dorsiflexion: 0     Right Ankle/Foot   Dorsiflexion: 0        Balance Testing:  Feet apart EO: 23 seconds (B AFO's)  Feet together EO: 4 seconds (B AFO's)    See Exercise, Manual, and Modality Logs for  complete treatment.     Assessment/Plan  The patient was re-evaluated today and presents with improvements in 6 minute walk test distance, TUG time, lower extremity strength, and standing balance (time) compared to his initial evaluation. Although improved, he continues to present with bilateral lower extremity weakness, difficulty walking, decreased balance and ongoing functional deficits. He would benefit from continued skilled physical therapy to address remaining functional deficits and to allow the patient to return to his prior level of function.     Goals  Plan Goals:    1. Mobility: Walking/Moving Around Functional Limitation                               LTG 1: 12 weeks:  The patient will demonstrate TUG score <16 seconds for reduced fall risk.                           STATUS:  Progressing              STG 1a: 6 weeks:  The patient will demonstrate TUG score <20 seconds for reduced fall risk.                           STATUS:  Progressing     2. The patient has limited strength of the B hips.              LTG 2: 12 weeks:  The patient will demonstrate 4 /5 strength for hip flexion, abduction, external rotation, and internal rotation in order to demonstrate improved shoulder stability.                          STATUS: Progressing              STG 2a: 6 weeks:  The patient will demonstrate 4-/5 strength for B hip flexion, abduction, and extension in order to improve hip stability.                          STATUS: Progressing      3. The patient has difficulty with balance.               LTG 3: 12 weeks: The patient will hold the romberg position with eyes open for 20 seconds in order to improve balance and decrease risk of falling.                           STATUS: Progressing              STG 3a: 6 weeks: The patient will hold the romberg position with eyes open for 10 seconds in order to improve balance and decrease risk of falling.                           STATUS: Progressing     4. The patient has  difficulty with endurance.               LTG 4: 12 weeks: The patient will be able to ambulate 750 with rollator in 6 minute walk test to decrease his risk for falling in community outings.                           STATUS: Progressing              STG 4a: 6 weeks: The patient will be able to ambulate 600 with rollator in 6 minute walk test to decrease his risk for falling in community outings.                           STATUS: Met    Progress toward previous goals: Partially Met      Recommendations: Continue as planned  Timeframe: 1 month  Prognosis to achieve goals: fair    PT Signature: Judah Dinh PT    Electronically signed 3/3/2025    [place] License: PT - [id number]       Timed:  Manual Therapy:    0     mins  03699;  Therapeutic Exercise:    15     mins  86018;     Neuromuscular Reyna:   10    mins  32277;    Therapeutic Activity:     15     mins  54415;     Gait Training:      15     mins  01483;     Aquatics                         0      mins  66799    Un-timed:  Mechanical Traction      0     mins  44333  Dry Needling     0     mins self-pay  Electrical Stimulation:    0     mins  58646 ( )  Re-evaluation:               0     mins 57825;    Timed Treatment:   55   mins   Total Treatment:     55   mins

## 2025-03-06 ENCOUNTER — TREATMENT (OUTPATIENT)
Dept: PHYSICAL THERAPY | Facility: CLINIC | Age: 75
End: 2025-03-06
Payer: MEDICARE

## 2025-03-06 DIAGNOSIS — R26.89 BALANCE DISORDER: ICD-10-CM

## 2025-03-06 DIAGNOSIS — R29.898 WEAKNESS OF BOTH LOWER EXTREMITIES: Primary | ICD-10-CM

## 2025-03-06 NOTE — PROGRESS NOTES
Outpatient Physical Therapy  [address] [facility], [place] [address]                            Physical Therapy Daily Treatment Note    Patient: Lj Crenshaw   : 1950  Diagnosis/ICD-10 Code:  Weakness of both lower extremities [R29.898]  Referring practitioner: Andrzej Cardona, *  Date of Initial Visit: Type: THERAPY  Noted: 2025  Today's Date: 3/6/2025  Patient seen for 11 sessions           Subjective   Lj Crenshaw reports: that he usually takes his shoes off as he is getting out of the car and puts his orange [employer] Balances on. Then goes up the few steps into the house.     Objective   General fatigue.    See Exercise, Manual, and Modality Logs for complete treatment.     Assessment/Plan  Lj progressing as evident by increased tolerance of PT session. Pt required Stand by Assist for Safety throughout PT session, general fatigue. Pt would benefit from skilled PT to address strength and endurance deficits, transfer and gait training and any concerns with ADLs.       Progress per Plan of Care         Timed:  Manual Therapy:        mins  81365;  Therapeutic Exercise:   10      mins  48282;     Neuromuscular Reyna:    10    mins  67558;    Therapeutic Activity:     20     mins  31050;     Gait Training:           mins  83183;        Timed Treatment:   40   mins   Total Treatment:     40   mins      Electronically signed:   Trudy Mckay PTA  Physical Therapist Assistant  [facility] License #: [id number]

## 2025-03-10 ENCOUNTER — TREATMENT (OUTPATIENT)
Dept: PHYSICAL THERAPY | Facility: CLINIC | Age: 75
End: 2025-03-10
Payer: MEDICARE

## 2025-03-10 DIAGNOSIS — R29.898 WEAKNESS OF BOTH LOWER EXTREMITIES: Primary | ICD-10-CM

## 2025-03-10 DIAGNOSIS — R26.89 BALANCE DISORDER: ICD-10-CM

## 2025-03-10 PROCEDURE — 97110 THERAPEUTIC EXERCISES: CPT | Performed by: PHYSICAL THERAPIST

## 2025-03-10 PROCEDURE — 97530 THERAPEUTIC ACTIVITIES: CPT | Performed by: PHYSICAL THERAPIST

## 2025-03-10 NOTE — PROGRESS NOTES
Outpatient Physical Therapy  [address] [facility], [place] [address]                            Physical Therapy Daily Treatment Note    Patient: Lj Crenshaw   : 1950  Diagnosis/ICD-10 Code:  Weakness of both lower extremities [R29.898]  Referring practitioner: Andrzej Cardona, *  Date of Initial Visit: Type: THERAPY  Noted: 2025  Today's Date: 3/10/2025  Patient seen for 12 sessions           Subjective   Lj Crenshaw reports: that he walks better without his AFOs and with his new balance shoes on because they aren't as big. Wife states that he has fallen coming up the stairs into the garage about 3 times in the last couple of weeks, patient stating that it was the other shoes.   Wife is inquiring about a platform lift for inside the garage and then a lift that he help with floor recovery. Their daughter and son-in-law can usually come over and help him up but if they are out of town then they have to call the ambulance to get him up.     Objective   General fatigue, slightly decreased ambulation speed compared to previous sessions.    See Exercise, Manual, and Modality Logs for complete treatment.     Assessment/Plan  Lj has experienced increased falls, usually when going up stairs in the garage and into the house. Pt required EAMAssistance: Stand by Assist for Safety throughout PT session, general fatigue. Pt would benefit from skilled PT to address strength and endurance deficits, transfer and gait training and any concerns with ADLs.       Progress per Plan of Care         Timed:  Manual Therapy:        mins  37034;  Therapeutic Exercise:    15     mins  00041;     Neuromuscular Reyna:        mins  03659;    Therapeutic Activity:     30     mins  32917;     Gait Training:           mins  95550;        Timed Treatment:   45   mins   Total Treatment:     45   mins      Electronically signed:   Trudy Mckay PTA  Physical Therapist Assistant  [facility] License #: [id number]

## 2025-03-13 ENCOUNTER — TREATMENT (OUTPATIENT)
Dept: PHYSICAL THERAPY | Facility: CLINIC | Age: 75
End: 2025-03-13
Payer: MEDICARE

## 2025-03-13 DIAGNOSIS — R29.898 WEAKNESS OF BOTH LOWER EXTREMITIES: Primary | ICD-10-CM

## 2025-03-13 DIAGNOSIS — R26.89 BALANCE DISORDER: ICD-10-CM

## 2025-03-13 NOTE — PROGRESS NOTES
"      Outpatient Physical Therapy  [address], [place] [address]                            Physical Therapy Daily Treatment Note    Patient: Lj Crenshaw   : 1950  Diagnosis/ICD-10 Code:  Weakness of both lower extremities [R29.898]  Referring practitioner: Andrzej Cardona, *  Date of Initial Visit: Type: THERAPY  Noted: 2025  Today's Date: 3/13/2025  Patient seen for 13 sessions           Subjective   Lj Crenhsaw reports: no falls since last PT session. They have talked about widening the steps in his garage so his feet fit on the stairs. He is wearing his \"uncomfortable shoes\" today, although he does have both AFOs on.     Objective   General fatigue.    See Exercise, Manual, and Modality Logs for complete treatment.     Assessment/Plan  Lj has recently experienced falls related to come in and out of house, although wife and daughters are problem solving how to make it safer. Pt tolerated exercises well, although no exercises were advanced due to general fatigue. Pt would benefit from skilled PT to address strength and endurance deficits, transfer and gait training and any concerns with ADLs.       Progress per Plan of Care         Timed:  Manual Therapy:        mins  48296;  Therapeutic Exercise:    15     mins  08278;     Neuromuscular Reyna:   10     mins  87528;    Therapeutic Activity:     20     mins  98603;     Gait Training:           mins  74904;        Timed Treatment:   45   mins   Total Treatment:     45   mins      Electronically signed:   Trudy Mckay PTA  Physical Therapist Assistant  [facility]ospit[facility] License #: [id number]  "

## 2025-03-17 ENCOUNTER — TREATMENT (OUTPATIENT)
Dept: PHYSICAL THERAPY | Facility: CLINIC | Age: 75
End: 2025-03-17
Payer: MEDICARE

## 2025-03-17 DIAGNOSIS — R29.898 WEAKNESS OF BOTH LOWER EXTREMITIES: Primary | ICD-10-CM

## 2025-03-17 DIAGNOSIS — R26.89 BALANCE DISORDER: ICD-10-CM

## 2025-03-17 NOTE — PROGRESS NOTES
Outpatient Physical Therapy  [address] [facility], [place] [address]                            Physical Therapy Daily Treatment Note    Patient: Lj Crenshaw   : 1950  Diagnosis/ICD-10 Code:  Weakness of both lower extremities [R29.898]  Referring practitioner: Andrzej Cardona, *  Date of Initial Visit: Type: THERAPY  Noted: 2025  Today's Date: 3/17/2025  Patient seen for 14 sessions           Subjective   Lj Crenshaw reports: that his legs haven't been feeling terrible. Reports that he will wear his other shoes without his AFOs at home, especially getting up the stairs in the garage into the house.    Objective   6 min walk test: 581 feet    See Exercise, Manual, and Modality Logs for complete treatment.     Assessment/Plan  Lj tolerating PT sessions well, although regressed in distance with 6min walk test. Pt would benefit from skilled PT to address strength and endurance deficits, transfer and gait training and any concerns with ADLs.       Progress per Plan of Care         Timed:  Manual Therapy:        mins  56056;  Therapeutic Exercise:    15     mins  38336;     Neuromuscular Reyna:    15    mins  32402;    Therapeutic Activity:     15     mins  12520;     Gait Training:           mins  02777;        Timed Treatment:   45   mins   Total Treatment:     45   mins      Electronically signed:   Trudy Mckay PTA  Physical Therapist Assistant  \[facility]\"" License #: [id number]

## 2025-03-20 ENCOUNTER — TREATMENT (OUTPATIENT)
Dept: PHYSICAL THERAPY | Facility: CLINIC | Age: 75
End: 2025-03-20
Payer: MEDICARE

## 2025-03-20 DIAGNOSIS — R26.89 BALANCE DISORDER: ICD-10-CM

## 2025-03-20 DIAGNOSIS — R29.898 WEAKNESS OF BOTH LOWER EXTREMITIES: Primary | ICD-10-CM

## 2025-03-20 NOTE — PROGRESS NOTES
Outpatient Physical Therapy  [address] [facility], [place] [address]                            Physical Therapy Daily Treatment Note    Patient: Lj Crenshaw   : 1950  Diagnosis/ICD-10 Code:  Weakness of both lower extremities [R29.898]  Referring practitioner: nAdrzej Cardona, *  Date of Initial Visit: Type: THERAPY  Noted: 2025  Today's Date: 3/20/2025  Patient seen for 15 sessions           Subjective   Lj Crenshaw reports: that his back is bothering him more than anything else today.     Objective   General fatigue    See Exercise, Manual, and Modality Logs for complete treatment.     Assessment/Plan  Lj progressing as evident by decreased falls and increased tolerance of PT session. Pt required Stand by Assist for Safety throughout PT session, general fatigue. Pt would benefit from skilled PT to address strength and endurance deficits, transfer and gait training and any concerns with ADLs.       Progress per Plan of Care         Timed:  Manual Therapy:        mins  22511;  Therapeutic Exercise:    15     mins  86027;     Neuromuscular Reyna:    15    mins  50471;    Therapeutic Activity:     15     mins  86844;     Gait Training:           mins  14082;        Timed Treatment:   45   mins   Total Treatment:     45   mins      Electronically signed:   Trudy Mckay PTA  Physical Therapist Assistant  [facility] License #: [id number]

## 2025-03-24 ENCOUNTER — TREATMENT (OUTPATIENT)
Dept: PHYSICAL THERAPY | Facility: CLINIC | Age: 75
End: 2025-03-24
Payer: MEDICARE

## 2025-03-24 DIAGNOSIS — R26.89 BALANCE DISORDER: ICD-10-CM

## 2025-03-24 DIAGNOSIS — R29.898 WEAKNESS OF BOTH LOWER EXTREMITIES: Primary | ICD-10-CM

## 2025-03-24 PROCEDURE — 97110 THERAPEUTIC EXERCISES: CPT | Performed by: PHYSICAL THERAPIST

## 2025-03-24 PROCEDURE — 97530 THERAPEUTIC ACTIVITIES: CPT | Performed by: PHYSICAL THERAPIST

## 2025-03-24 PROCEDURE — 97112 NEUROMUSCULAR REEDUCATION: CPT | Performed by: PHYSICAL THERAPIST

## 2025-03-24 NOTE — PROGRESS NOTES
Outpatient Physical Therapy  [address] [facility], [place] [address]                            Physical Therapy Daily Treatment Note    Patient: Lj Crenshaw   : 1950  Diagnosis/ICD-10 Code:  Weakness of both lower extremities [R29.898]  Referring practitioner: Andrzej Cardona, *  Date of Initial Visit: Type: THERAPY  Noted: 2025  Today's Date: 3/24/2025  Patient seen for 16 sessions           Subjective   Lj Crenshaw reports: legs feeling alright today, no falls over the last few days.    Objective   General fatigue    See Exercise, Manual, and Modality Logs for complete treatment.     Assessment/Plan  Lj progressing as evident by increased tolerance of PT session, although general fatigue. Pt still having issues getting into his house due to the steps in the garage. Pt would benefit from skilled PT to address strength and endurance deficits, transfer and gait training and any concerns with ADLs.       Progress per Plan of Care         Timed:  Manual Therapy:         mins  69994;  Therapeutic Exercise:    15     mins  01038;     Neuromuscular Reyna:    10    mins  15445;    Therapeutic Activity:     20     mins  93950;     Gait Training:           mins  40224;      Untimed:  Electrical Stimulation:         mins  19455 ( );  Mechanical Traction:         mins  95219;     Timed Treatment:   45   mins   Total Treatment:     45   mins      Electronically signed:     Trudy Mckay PTA  Physical Therapist Assistant  [facility] License #: [id number]

## 2025-03-27 ENCOUNTER — TREATMENT (OUTPATIENT)
Dept: PHYSICAL THERAPY | Facility: CLINIC | Age: 75
End: 2025-03-27
Payer: MEDICARE

## 2025-03-27 DIAGNOSIS — R26.89 BALANCE DISORDER: ICD-10-CM

## 2025-03-27 DIAGNOSIS — R29.898 WEAKNESS OF BOTH LOWER EXTREMITIES: Primary | ICD-10-CM

## 2025-03-27 NOTE — PROGRESS NOTES
Outpatient Physical Therapy  [address] [facility], [place] [address]                            Physical Therapy Daily Treatment Note    Patient: Lj Crenshaw   : 1950  Diagnosis/ICD-10 Code:  Weakness of both lower extremities [R29.898]  Referring practitioner: Andrzej Cardona, *  Date of Initial Visit: Type: THERAPY  Noted: 2025  Today's Date: 3/27/2025  Patient seen for 17 sessions           Subjective   Lj Crenshaw reports: that he had a friend come visit yesterday and he asked him when he got so bad. States that life has really caught up with him.     Objective   6 min walk test: 610 ft.    See Exercise, Manual, and Modality Logs for complete treatment.     Assessment/Plan  Lj progressing as evident by increased tolerance of PT session. Pt required Stand by Assist for Safety throughout PT session, general fatigue throughout PT session. Pt would benefit from skilled PT to address strength and endurance deficits, transfer and gait training and any concerns with ADLs.       Progress per Plan of Care         Timed:  Manual Therapy:        mins  85865;  Therapeutic Exercise:    15     mins  61373;     Neuromuscular Reyna:    10    mins  55893;    Therapeutic Activity:     20     mins  48436;     Gait Training:           mins  61587;        Timed Treatment:   45   mins   Total Treatment:     45   mins      Electronically signed:   Trudy Mckay PTA  Physical Therapist Assistant  [facility]ospit[facility] License #: [id number]

## 2025-03-31 ENCOUNTER — TREATMENT (OUTPATIENT)
Dept: PHYSICAL THERAPY | Facility: CLINIC | Age: 75
End: 2025-03-31
Payer: MEDICARE

## 2025-03-31 ENCOUNTER — TELEPHONE (OUTPATIENT)
Dept: FAMILY MEDICINE CLINIC | Facility: CLINIC | Age: 75
End: 2025-03-31
Payer: MEDICARE

## 2025-03-31 DIAGNOSIS — R26.89 BALANCE DISORDER: ICD-10-CM

## 2025-03-31 DIAGNOSIS — M21.379 FOOT-DROP, UNSPECIFIED LATERALITY: Primary | ICD-10-CM

## 2025-03-31 DIAGNOSIS — R29.898 WEAKNESS OF BOTH LOWER EXTREMITIES: Primary | ICD-10-CM

## 2025-03-31 PROCEDURE — 97110 THERAPEUTIC EXERCISES: CPT | Performed by: PHYSICAL THERAPIST

## 2025-03-31 PROCEDURE — 97530 THERAPEUTIC ACTIVITIES: CPT | Performed by: PHYSICAL THERAPIST

## 2025-03-31 PROCEDURE — 97112 NEUROMUSCULAR REEDUCATION: CPT | Performed by: PHYSICAL THERAPIST

## 2025-03-31 NOTE — PROGRESS NOTES
Outpatient Physical Therapy  [address] [facility], [place] [address]                            Physical Therapy Daily Treatment Note    Patient: Lj Crenshaw   : 1950  Diagnosis/ICD-10 Code:  Weakness of both lower extremities [R29.898]  Referring practitioner: Andrzej Cardona, *  Date of Initial Visit: Type: THERAPY  Noted: 2025  Today's Date: 3/31/2025  Patient seen for 18 sessions           Subjective   Lj Crenshaw reports: that they talked about putting a ramp in the garage for him to get into the house, they could put thinner handles that he could hold on to/pull on to get up the ramp. No falls the last few days.     Objective   General fatigue.    See Exercise, Manual, and Modality Logs for complete treatment.     Assessment/Plan  Lj progressing as evident by decreased falls and increased tolerance of PT session. Pt required Stand by Assist for Safety throughout PT session, general fatigue. Pt would benefit from skilled PT to address strength and endurance deficits, transfer and gait training and any concerns with ADLs.       Progress per Plan of Care         Timed:  Manual Therapy:        mins  42781;  Therapeutic Exercise:    15     mins  49840;     Neuromuscular Reyna:    10    mins  17875;    Therapeutic Activity:     20     mins  13141;     Gait Training:           mins  78347;        Timed Treatment:   45   mins   Total Treatment:     45   mins      Electronically signed:   Trudy Mckay PTA  Physical Therapist Assistant  [facility] License #: [id number]

## 2025-03-31 NOTE — TELEPHONE ENCOUNTER
"    Caller: Lj Crenshaw \"Poli\"    Relationship: Self    Best call back number: [phone]    What is the medical concern/diagnosis: FOOT DROP    What specialty or service is being requested: NUEROLOGIST    What is the provider, practice or medical service name: DR THIAGO WELDON    What is the office location: [facility]     What is the office phone number: [phone]    Any additional details: PATIENT REQUESTS A REFERRAL TO DR WELDON AND WOULD LIKE IT FAXED [phone]      "

## 2025-04-02 ENCOUNTER — TREATMENT (OUTPATIENT)
Dept: PHYSICAL THERAPY | Facility: CLINIC | Age: 75
End: 2025-04-02
Payer: MEDICARE

## 2025-04-02 DIAGNOSIS — R29.898 WEAKNESS OF BOTH LOWER EXTREMITIES: Primary | ICD-10-CM

## 2025-04-02 DIAGNOSIS — R26.89 BALANCE DISORDER: ICD-10-CM

## 2025-04-02 NOTE — PROGRESS NOTES
Progress Note  [facility] [address] [place] [address]      Patient: Lj Crenshaw   : 1950  Diagnosis/ICD-10 Code:  Weakness of both lower extremities [R29.898]  Referring practitioner: Andrzej Cardona, *  Date of Initial Visit: Type: THERAPY  Noted: 2025  Today's Date: 2025  Patient seen for 19 sessions      Subjective:   Subjective Questionnaire: Oswestry: 14/45 (31.1% disability)   Clinical Progress: improved  Home Program Compliance: Yes  Treatment has included: therapeutic exercise, neuromuscular re-education, therapeutic activity, and gait training    Subjective   The patient reported that his pain level is a 0/10 today. Over the past month he has noticed that he can stand up more easily from his lift chair at home. He doesn't have to raise it as high in order to stand. He can also tell that he is able to stand for longer periods of time at the counter while cooking. Yesterday he was able to walk about a 1/2 mile per his estimate to get to an appointment. He would like to continue with PT at this time.    Objective   TU.5 seconds with use of rolator and B AFO's (normal height chair with airex in seat)      6 minute walk test: 629 feet with use of rolator and B AFO's         Strength/Myotome Testing      Left Shoulder      Planes of Motion   Flexion: 5   Abduction: 4+      Isolated Muscles   Biceps: 5      Right Shoulder      Planes of Motion   Flexion: 4+   Abduction: 5      Isolated Muscles   Biceps: 5          Strength     Left Hip   Planes of Motion   Flexion: 2+  Abduction: 4 (supine)  Adduction: 2-     Right Hip   Planes of Motion   Flexion: 2+  Abduction: 4 (supine)  Adduction: 2-     Left Knee   Flexion: 4  Extension: 4     Right Knee   Flexion: 4-  Extension: 4     Left Ankle/Foot   Dorsiflexion: 0     Right Ankle/Foot   Dorsiflexion: 0        Balance Testing:  Feet apart EO: 45 seconds (B AFO's)  Feet together EO: 12 seconds (B AFO's)    See  Exercise, Manual, and Modality Logs for complete treatment.     Assessment/Plan  The patient was re-evaluated today and presents with improvements in 6 minute walk test distance, standing balance, and knee strength compared to his previous assessment. Although improved, he continues to present with lower extremity weakness, difficulty walking, decreased balance, and ongoing functional deficits (LEFS). He would benefit from continued skilled physical therapy to address remaining functional deficits and to allow the patient to return to his prior level of function.        Goals  Plan Goals:    1. Mobility: Walking/Moving Around Functional Limitation                               LTG 1: 12 weeks:  The patient will demonstrate TUG score <16 seconds for reduced fall risk.                           STATUS:  Progressing              STG 1a: 6 weeks:  The patient will demonstrate TUG score <20 seconds for reduced fall risk.                           STATUS:  Progressing     2. The patient has limited strength of the B hips.              LTG 2: 12 weeks:  The patient will demonstrate 4 /5 strength for hip flexion, abduction, external rotation, and internal rotation in order to demonstrate improved shoulder stability.                          STATUS: Progressing              STG 2a: 6 weeks:  The patient will demonstrate 4-/5 strength for B hip flexion, abduction, and extension in order to improve hip stability.                          STATUS: Progressing      3. The patient has difficulty with balance.               LTG 3: 12 weeks: The patient will hold the romberg position with eyes open for 20 seconds in order to improve balance and decrease risk of falling.                           STATUS: Progressing              STG 3a: 6 weeks: The patient will hold the romberg position with eyes open for 10 seconds in order to improve balance and decrease risk of falling.                           STATUS: Met     4. The patient has  difficulty with endurance.               LTG 4: 12 weeks: The patient will be able to ambulate 750 with rollator in 6 minute walk test to decrease his risk for falling in community outings.                           STATUS: Progressing              STG 4a: 6 weeks: The patient will be able to ambulate 600 with rollator in 6 minute walk test to decrease his risk for falling in community outings.                           STATUS: Met    Progress toward previous goals: Partially Met      Recommendations: Extend PT by one month as he continues to make progress.  Timeframe: 1 month  Prognosis to achieve goals: fair    PT Signature: Judah Dinh, PT    Electronically signed 2025    [place] License: PT - [id number]       Timed:  Manual Therapy:    0     mins  26979;  Therapeutic Exercise:    15     mins  60798;     Neuromuscular Reyna:   10    mins  60869;    Therapeutic Activity:     15     mins  00749;     Gait Trainin     mins  66318;     Aquatics                         0      mins  99069    Un-timed:  Mechanical Traction      0     mins  76012  Dry Needling     0     mins self-pay  Electrical Stimulation:    0     mins  36407 ( )  Re-evaluation:               10     mins 95170;    Timed Treatment:   40   mins   Total Treatment:     50   mins

## 2025-04-07 ENCOUNTER — TREATMENT (OUTPATIENT)
Dept: PHYSICAL THERAPY | Facility: CLINIC | Age: 75
End: 2025-04-07
Payer: MEDICARE

## 2025-04-07 DIAGNOSIS — R26.89 BALANCE DISORDER: ICD-10-CM

## 2025-04-07 DIAGNOSIS — R29.898 WEAKNESS OF BOTH LOWER EXTREMITIES: Primary | ICD-10-CM

## 2025-04-07 PROCEDURE — 97110 THERAPEUTIC EXERCISES: CPT | Performed by: PHYSICAL THERAPIST

## 2025-04-07 PROCEDURE — 97530 THERAPEUTIC ACTIVITIES: CPT | Performed by: PHYSICAL THERAPIST

## 2025-04-07 PROCEDURE — 97116 GAIT TRAINING THERAPY: CPT | Performed by: PHYSICAL THERAPIST

## 2025-04-07 NOTE — PROGRESS NOTES
Physical Therapy Daily Treatment Note                      [facility] [address] [place] [address]    Patient: Lj Crenshaw   : 1950  Diagnosis/ICD-10 Code:  Weakness of both lower extremities [R29.898]  Referring practitioner: Andrzej Cardona, *  Date of Initial Visit: Type: THERAPY  Noted: 2025  Today's Date: 2025  Patient seen for 20 sessions           Subjective   The patient reported no new complaints today. He is working on getting a ramp installed at his house.    Objective   See Exercise, Manual, and Modality Logs for complete treatment.     Assessment/Plan    The patient demonstrated good tolerance to all functional lower extremity strengthening exercise and gait training to improve endurance. He continues to fatigue quickly and demonstrate signs of significant lower extremity weakness. Continue to progress with current PT plan of care per patient tolerance.         Timed:  Manual Therapy:    0     mins  28157;  Therapeutic Exercise:    15     mins  18641;     Neuromuscular Reyna:   0    mins  19780;    Therapeutic Activity:     13     mins  72612;     Gait Training:      10     mins  61905;     Aquatics                         0      mins  12579    Un-timed:  Mechanical Traction      0     mins  50512  Dry Needling     0     mins self-pay  Electrical Stimulation:    0     mins  74976 ( );      Timed Treatment:   38   mins   Total Treatment:     38   mins    Judah Dinh PT  Physical Therapist    Electronically signed 2025    [place] License: PT - [id number]

## 2025-04-22 ENCOUNTER — TREATMENT (OUTPATIENT)
Dept: PHYSICAL THERAPY | Facility: CLINIC | Age: 75
End: 2025-04-22
Payer: MEDICARE

## 2025-04-22 DIAGNOSIS — R29.898 WEAKNESS OF BOTH LOWER EXTREMITIES: Primary | ICD-10-CM

## 2025-04-22 DIAGNOSIS — R26.89 BALANCE DISORDER: ICD-10-CM

## 2025-04-22 PROCEDURE — 97530 THERAPEUTIC ACTIVITIES: CPT | Performed by: PHYSICAL THERAPIST

## 2025-04-22 PROCEDURE — 97110 THERAPEUTIC EXERCISES: CPT | Performed by: PHYSICAL THERAPIST

## 2025-04-22 PROCEDURE — 97112 NEUROMUSCULAR REEDUCATION: CPT | Performed by: PHYSICAL THERAPIST

## 2025-04-22 NOTE — PROGRESS NOTES
[facility] [place] [facility]                              Physical Therapy Daily Treatment Note    Patient: Lj Crenshaw   : 1950  Diagnosis/ICD-10 Code:  Weakness of both lower extremities [R29.898]  Referring practitioner: Andrzej Cardona, *  Date of Initial Visit: Type: THERAPY  Noted: 2025  Today's Date: 2025  Patient seen for 21 sessions           Subjective   Lj Crenshaw reports: no new complaints at time of therapy.    Objective     See Exercise, Manual, and Modality Logs for complete treatment.     Assessment/Plan  Patient demonstrated good endurance this session with fewer rest breaks. Will continue to progress patient per POC and patient tolerance in order to improve upon functional daily activities safely.    Progress per Plan of Care         Timed:  Manual Therapy:         mins  21585;  Therapeutic Exercise:    12     mins  68315;     Neuromuscular Reyna:    10    mins  65499;    Therapeutic Activity:     18     mins  09815;     Gait Training:           mins  79380;        Untimed:  Electrical Stimulation:         mins  44245 ( );  Mechanical Traction:         mins  61486;       Timed Treatment:   40   mins   Total Treatment:     40   mins      Electronically signed:     Josie Ingram PTA  Physical Therapist Assistant  \[facility]\"" License #: [id number]

## 2025-04-24 ENCOUNTER — TREATMENT (OUTPATIENT)
Dept: PHYSICAL THERAPY | Facility: CLINIC | Age: 75
End: 2025-04-24
Payer: MEDICARE

## 2025-04-24 DIAGNOSIS — R29.898 WEAKNESS OF BOTH LOWER EXTREMITIES: Primary | ICD-10-CM

## 2025-04-24 DIAGNOSIS — R26.89 BALANCE DISORDER: ICD-10-CM

## 2025-04-24 NOTE — PROGRESS NOTES
[facility] [place] [facility]                              Physical Therapy Daily Treatment Note    Patient: Lj Crenshaw   : 1950  Diagnosis/ICD-10 Code:  Weakness of both lower extremities [R29.898]  Referring practitioner: Andrzej Cardona, *  Date of Initial Visit: Type: THERAPY  Noted: 2025  Today's Date: 2025  Patient seen for 22 sessions           Subjective   Lj Crenshaw reports: he was sore after last session, and is a little tired today.      Objective     See Exercise, Manual, and Modality Logs for complete treatment.     Assessment/Plan  Patient presents with step to stance on his RLE when ambulating today in clinic, but able to complete step through on his LLE. Will continue to progress patient per POC and patient tolerance in order to improve upon functional daily activities.    Progress per Plan of Care         Timed:  Manual Therapy:         mins  00987;  Therapeutic Exercise:    10     mins  09455;     Neuromuscular Reyna:    10    mins  64757;    Therapeutic Activity:     18     mins  98867;     Gait Training:           mins  52619;        Untimed:  Electrical Stimulation:         mins  26771 ( );  Mechanical Traction:         mins  17148;       Timed Treatment:   38   mins   Total Treatment:     38   mins      Electronically signed:     Josie Ingram PTA  Physical Therapist Assistant  [facility] License #: [id number]

## 2025-04-28 ENCOUNTER — TREATMENT (OUTPATIENT)
Dept: PHYSICAL THERAPY | Facility: CLINIC | Age: 75
End: 2025-04-28
Payer: MEDICARE

## 2025-04-28 DIAGNOSIS — R29.898 WEAKNESS OF BOTH LOWER EXTREMITIES: Primary | ICD-10-CM

## 2025-04-28 DIAGNOSIS — R26.89 BALANCE DISORDER: ICD-10-CM

## 2025-04-28 PROCEDURE — 97112 NEUROMUSCULAR REEDUCATION: CPT | Performed by: PHYSICAL THERAPIST

## 2025-04-28 PROCEDURE — 97530 THERAPEUTIC ACTIVITIES: CPT | Performed by: PHYSICAL THERAPIST

## 2025-04-28 PROCEDURE — 97110 THERAPEUTIC EXERCISES: CPT | Performed by: PHYSICAL THERAPIST

## 2025-04-28 NOTE — PROGRESS NOTES
[facility]                              Physical Therapy Daily Treatment Note    Patient: Lj Crenshaw   : 1950  Diagnosis/ICD-10 Code:  Weakness of both lower extremities [R29.898]  Referring practitioner: Andrzej Cardona, *  Date of Initial Visit: Type: THERAPY  Noted: 2025  Today's Date: 2025  Patient seen for 23 sessions           Subjective   Lj Crenshaw reports: he is pretty tired today. Pt states it took him awhile to get off of the commode on Saturday because he was having a hard time getting himself up even with the grab bar and vanity counter and was worn out by the time he got up. Pt states he was thinking about seeing an acupuncture because he heard it could help with the nerves in is legs.       Objective       See Exercise, Manual, and Modality Logs for complete treatment.     Assessment/Plan  Patient present to therapy with fatigue and low endurance this session. Pt was only able to hold the yoga block in between his knee 3x today stating his legs were so tired. Will continue to progress patient per POC and patient tolerance in order to improve upon functional daily activities such as walking.    Progress per Plan of Care         Timed:  Manual Therapy:         mins  62828;  Therapeutic Exercise:     15    mins  02265;     Neuromuscular Reyna:    10    mins  43763;    Therapeutic Activity:     15     mins  49468;     Gait Training:           mins  92702;        Untimed:  Electrical Stimulation:         mins  38710 ( );  Mechanical Traction:         mins  34096;       Timed Treatment:   40   mins   Total Treatment:     40   mins      Electronically signed:     Josie Ingram PTA  Physical Therapist Assistant  [facility] License #: [id number]

## 2025-05-01 ENCOUNTER — TREATMENT (OUTPATIENT)
Dept: PHYSICAL THERAPY | Facility: CLINIC | Age: 75
End: 2025-05-01
Payer: MEDICARE

## 2025-05-01 DIAGNOSIS — R29.898 WEAKNESS OF BOTH LOWER EXTREMITIES: Primary | ICD-10-CM

## 2025-05-01 DIAGNOSIS — R26.89 BALANCE DISORDER: ICD-10-CM

## 2025-05-01 NOTE — PROGRESS NOTES
Outpatient Physical Therapy  [address] [facility], [place] [address]                            Physical Therapy Daily Treatment Note    Patient: Lj Crenshaw   : 1950  Diagnosis/ICD-10 Code:  Weakness of both lower extremities [R29.898]  Referring practitioner: Andrzej Cardona, *  Date of Initial Visit: Type: THERAPY  Noted: 2025  Today's Date: 2025  Patient seen for 24 sessions           Subjective   Lj Crenshaw reports: that he doesn't like his current shoes because he can't walk as well in them. He usually walks around the house bare foot, or puts his other shoes on. States that the person should be out soon to put a ramp in the garage so he can get in the house better.     Objective   General fatigue.    See Exercise, Manual, and Modality Logs for complete treatment.     Assessment/Plan  Lj progressing as evident by decreased falls and increased tolerance of PT session. Pt required no assistance throughout PT session, no LOB just general fatigue. Pt would benefit from skilled PT to address strength and endurance deficits, transfer and gait training and any concerns with ADLs.       Progress per Plan of Care         Timed:  Manual Therapy:        mins  76706;  Therapeutic Exercise:    15     mins  34095;     Neuromuscular Reyna:    10    mins  89378;    Therapeutic Activity:     20     mins  82734;     Gait Training:           mins  63565;        Timed Treatment:   45   mins   Total Treatment:     45   mins      Electronically signed:   Trudy Mckay PTA  Physical Therapist Assistant  [facility] License #: [id number]

## 2025-05-05 ENCOUNTER — TREATMENT (OUTPATIENT)
Dept: PHYSICAL THERAPY | Facility: CLINIC | Age: 75
End: 2025-05-05
Payer: MEDICARE

## 2025-05-05 DIAGNOSIS — R26.89 BALANCE DISORDER: ICD-10-CM

## 2025-05-05 DIAGNOSIS — R29.898 WEAKNESS OF BOTH LOWER EXTREMITIES: Primary | ICD-10-CM

## 2025-05-05 PROCEDURE — 97110 THERAPEUTIC EXERCISES: CPT | Performed by: PHYSICAL THERAPIST

## 2025-05-05 PROCEDURE — 97164 PT RE-EVAL EST PLAN CARE: CPT | Performed by: PHYSICAL THERAPIST

## 2025-05-05 PROCEDURE — 97530 THERAPEUTIC ACTIVITIES: CPT | Performed by: PHYSICAL THERAPIST

## 2025-05-05 PROCEDURE — 97116 GAIT TRAINING THERAPY: CPT | Performed by: PHYSICAL THERAPIST

## 2025-05-05 NOTE — PROGRESS NOTES
Re-Assessment / Discharge  [facility] [address] [place] [address]      Patient: Lj Crenshaw   : 1950  Diagnosis/ICD-10 Code:  Weakness of both lower extremities [R29.898]  Referring practitioner: Andrzej Cardona, *  Date of Initial Visit: Type: THERAPY  Noted: 2025  Today's Date: 2025  Patient seen for 25 sessions      Subjective:   Subjective Questionnaire: Oswestry: 15/45 = 33.3% Disability  Clinical Progress: unchanged  Home Program Compliance: Fair  Treatment has included: therapeutic exercise, neuromuscular re-education, manual therapy, and therapeutic activity    Subjective   The patient reported that he hasn't done much over the weekend due to the rain. Over the past month, he has done some of his home exercises, but not everyday. He can tell that he takes a step back when he doesn't exercise regularly. He would like to continue with PT, but is agreeable to discharge due to plateau in progress.    Objective     TU.7 seconds with use of rolator and B AFO's (normal height chair with airex in seat)      6 minute walk test: 547 feet with use of rolator and B AFO's         Strength/Myotome Testing      Left Shoulder      Planes of Motion   Flexion: 5   Abduction: 4+      Isolated Muscles   Biceps: 5      Right Shoulder      Planes of Motion   Flexion: 4+   Abduction: 5      Isolated Muscles   Biceps: 5          Strength     Left Hip   Planes of Motion   Flexion: 2+  Abduction: 4 (supine)  Adduction: 2-     Right Hip   Planes of Motion   Flexion: 2+  Abduction: 4 (supine)  Adduction: 2-     Left Knee   Flexion: 4  Extension: 4-     Right Knee   Flexion: 4  Extension: 4-     Left Ankle/Foot   Dorsiflexion: 0     Right Ankle/Foot   Dorsiflexion: 0        Balance Testing:  Feet apart EO: 45 seconds (B AFO's)  Feet together EO: 12 seconds (B AFO's)    See Exercise, Manual, and Modality Logs for complete treatment.     Assessment & Plan       Assessment  Assessment  details: The patient was re-evaluated today and presents with a plateau in progress compared to previous reassessment. His functional testing and strength testing has not improved compared to his last assessment. Due to lack of progress, he is appropriate for discharge from PT at this time. We discussed the importance of regular exercise at home. He has gained familiarity with a large array of exercise related interventions and is capable of performing most of these independently at home at this time to maintain strength gains from initial evaluation to today. His signs/symptoms continue to present as consistent with neurologic dysfunction rather that a musculoskeletal dysfunction. He will be discharged at this time.    Goals  Plan Goals: 1. Mobility: Walking/Moving Around Functional Limitation                               LTG 1: 12 weeks:  The patient will demonstrate TUG score <16 seconds for reduced fall risk.                           STATUS:  Progressing              STG 1a: 6 weeks:  The patient will demonstrate TUG score <20 seconds for reduced fall risk.                           STATUS:  Progressing     2. The patient has limited strength of the B hips.              LTG 2: 12 weeks:  The patient will demonstrate 4 /5 strength for hip flexion, abduction, external rotation, and internal rotation in order to demonstrate improved shoulder stability.                          STATUS: Progressing              STG 2a: 6 weeks:  The patient will demonstrate 4-/5 strength for B hip flexion, abduction, and extension in order to improve hip stability.                          STATUS: Progressing      3. The patient has difficulty with balance.               LTG 3: 12 weeks: The patient will hold the romberg position with eyes open for 20 seconds in order to improve balance and decrease risk of falling.                           STATUS: Progressing              STG 3a: 6 weeks: The patient will hold the romberg  position with eyes open for 10 seconds in order to improve balance and decrease risk of falling.                           STATUS: Met     4. The patient has difficulty with endurance.               LTG 4: 12 weeks: The patient will be able to ambulate 750 with rollator in 6 minute walk test to decrease his risk for falling in community outings.                           STATUS: Progressing              STG 4a: 6 weeks: The patient will be able to ambulate 600 with rollator in 6 minute walk test to decrease his risk for falling in community outings.                           STATUS: Met        Progress toward previous goals: Partially Met      Recommendations: Discharge    Prognosis to achieve goals: fair    PT Signature: Judah Dinh PT    Electronically signed 2025    [place] License: PT - [id number]       Timed:  Manual Therapy:    0     mins  13056;  Therapeutic Exercise:    15     mins  11384;     Neuromuscular Reyna:    0    mins  25678;    Therapeutic Activity:     15     mins  85898;     Gait Trainin     mins  50171;     Aquatics                         0      mins  16514    Un-timed:  Mechanical Traction      0     mins  51714  Dry Needling     0     mins self-pay  Electrical Stimulation:    0     mins  38024 ( )  Re-evaluation:               8     mins 35347;    Timed Treatment:   38   mins   Total Treatment:     46   mins

## 2025-05-07 ENCOUNTER — OFFICE VISIT (OUTPATIENT)
Dept: FAMILY MEDICINE CLINIC | Facility: CLINIC | Age: 75
End: 2025-05-07
Payer: MEDICARE

## 2025-05-07 VITALS
SYSTOLIC BLOOD PRESSURE: 117 MMHG | OXYGEN SATURATION: 96 % | DIASTOLIC BLOOD PRESSURE: 74 MMHG | BODY MASS INDEX: 31.35 KG/M2 | WEIGHT: 271 LBS | TEMPERATURE: 97.5 F | HEART RATE: 98 BPM | HEIGHT: 78 IN

## 2025-05-07 DIAGNOSIS — M54.10 POLYRADICULOPATHY: ICD-10-CM

## 2025-05-07 DIAGNOSIS — M21.371 FOOT DROP, RIGHT: ICD-10-CM

## 2025-05-07 DIAGNOSIS — I10 ESSENTIAL HYPERTENSION: ICD-10-CM

## 2025-05-07 DIAGNOSIS — E78.2 MIXED HYPERLIPIDEMIA: ICD-10-CM

## 2025-05-07 DIAGNOSIS — M48.07 FORAMINAL STENOSIS OF LUMBOSACRAL REGION: Primary | ICD-10-CM

## 2025-05-07 RX ORDER — AMOXICILLIN 500 MG/1
CAPSULE ORAL
COMMUNITY
Start: 2025-03-14

## 2025-05-07 NOTE — PROGRESS NOTES
Subjective   The ABCs of the Annual Wellness Visit  Medicare Wellness Visit      Lj Crenshaw is a 75 y.o. patient who presents for a Medicare Wellness Visit.    The following portions of the patient's history were reviewed and   updated as appropriate: allergies, current medications, past family history, past medical history, past social history, past surgical history, and problem list.    Compared to one year ago, the patient's physical   health is the same.  Compared to one year ago, the patient's mental   health is the same.    Recent Hospitalizations:  He was not admitted to the hospital during the last year.     Current Medical Providers:  Patient Care Team:  Andrzej Cardona MD as PCP - General (Internal Medicine)    Outpatient Medications Prior to Visit   Medication Sig Dispense Refill    amoxicillin (AMOXIL) 500 MG capsule       Cyanocobalamin 1000 MCG sublingual tablet Place 1 tablet under the tongue Daily. 90 each 1    lisinopril-hydrochlorothiazide (PRINZIDE,ZESTORETIC) 10-12.5 MG per tablet Take 1 tablet by mouth Daily. 90 tablet 3    prednisoLONE acetate (PRED FORTE) 1 % ophthalmic suspension 1 drop 4 (Four) Times a Day.      rosuvastatin (Crestor) 40 MG tablet Take 1 tablet by mouth Daily. generic 90 tablet 3    ketorolac (ACULAR) 0.5 % ophthalmic solution  (Patient not taking: Reported on 5/7/2025)       No facility-administered medications prior to visit.     No opioid medication identified on active medication list. I have reviewed chart for other potential  high risk medication/s and harmful drug interactions in the elderly.      Aspirin is not on active medication list.  Aspirin use is not indicated based on review of current medical condition/s. Risk of harm outweighs potential benefits.  .    Patient Active Problem List   Diagnosis    Essential hypertension    Hyperlipidemia    Polyradiculopathy    Foot drop, right    Disorder of aorta    Balance disorder     Advance Care  "Planning Advance Directive is on file.  ACP discussion was held with the patient during this visit. Patient has an advance directive in EMR which is still valid.             Objective   Vitals:    25 0730   BP: 117/74   Pulse: 98   Temp: 97.5 °F (36.4 °C)   TempSrc: Temporal   SpO2: 96%   Weight: 123 kg (271 lb)   Height: 198.1 cm (77.99\")   PainSc: 0-No pain       Estimated body mass index is 31.32 kg/m² as calculated from the following:    Height as of this encounter: 198.1 cm (77.99\").    Weight as of this encounter: 123 kg (271 lb).                Does the patient have evidence of cognitive impairment? No                                                                                                Health  Risk Assessment    Smoking Status:  Social History     Tobacco Use   Smoking Status Former    Current packs/day: 0.00    Average packs/day: 1 pack/day for 11.0 years (11.0 ttl pk-yrs)    Types: Cigarettes    Start date:     Quit date:     Years since quittin.3   Smokeless Tobacco Never     Alcohol Consumption:  Social History     Substance and Sexual Activity   Alcohol Use Yes    Alcohol/week: 1.0 standard drink of alcohol    Types: 1 Cans of beer per week    Comment: only if goes out to eat     Vision Screening    Right eye Left eye Both eyes   Without correction      With correction   20:20      Gait and Balance Evaluation:  Walker     Fall Risk Screen  STEADI Fall Risk Assessment was completed, and patient is at MODERATE risk for falls. Assessment completed on:2025    Depression Screening   Little interest or pleasure in doing things? Not at all   Feeling down, depressed, or hopeless? Not at all   PHQ-2 Total Score 0      Health Habits and Functional and Cognitive Screenin/7/2025     7:00 AM   Functional & Cognitive Status   Do you have difficulty preparing food and eating? No   Do you have difficulty bathing yourself, getting dressed or grooming yourself? No   Do you have " difficulty using the toilet? No   Do you have difficulty moving around from place to place? No   Do you have trouble with steps or getting out of a bed or a chair? No   Current Diet Well Balanced Diet   Dental Exam Up to date   Eye Exam Up to date   Exercise (times per week) 5 times per week   Current Exercises Include Walking   Do you need help using the phone?  No   Are you deaf or do you have serious difficulty hearing?  No   Do you need help to go to places out of walking distance? No   Do you need help shopping? No   Do you need help preparing meals?  No   Do you need help with housework?  No   Do you need help with laundry? No   Do you need help taking your medications? No   Do you need help managing money? No   Do you ever drive or ride in a car without wearing a seat belt? No   Have you felt unusual stress, anger or loneliness in the last month? No   Who do you live with? Spouse   If you need help, do you have trouble finding someone available to you? No   Have you been bothered in the last four weeks by sexual problems? No   Do you have difficulty concentrating, remembering or making decisions? No           Age-appropriate Screening Schedule:  Refer to the list below for future screening recommendations based on patient's age, sex and/or medical conditions. Orders for these recommended tests are listed in the plan section. The patient has been provided with a written plan.    Health Maintenance List  Health Maintenance   Topic Date Due    COVID-19 Vaccine (3 - 2024-25 season) 09/01/2024    ANNUAL WELLNESS VISIT  05/06/2025    INFLUENZA VACCINE  07/01/2025    LIPID PANEL  07/01/2025    COLORECTAL CANCER SCREENING  07/11/2027    TDAP/TD VACCINES (2 - Td or Tdap) 09/18/2033    HEPATITIS C SCREENING  Completed    Pneumococcal Vaccine 50+  Completed    AAA SCREEN ONCE  Completed    ZOSTER VACCINE  Completed                                                                                                             "                                    CMS Preventative Services Quick Reference  Risk Factors Identified During Encounter  None Identified    The above risks/problems have been discussed with the patient.  Pertinent information has been shared with the patient in the After Visit Summary.  An After Visit Summary and PPPS were made available to the patient.    Follow Up:   Next Medicare Wellness visit to be scheduled in 1 year.         Additional E&M Note during same encounter follows:  Patient has additional, significant, and separately identifiable condition(s)/problem(s) that require work above and beyond the Medicare Wellness Visit     Chief Complaint  Medicare Wellness-subsequent    Subjective   HPI  Poli is also being seen today for additional medical problem/s.        Patient BP is good. No changes in meds. HLD continue statins. Foot drop bilateral. Pt might benefit from continues physical therapy to prevent deconditioning. He might benefit from acupuncture. Referral given.          Objective   Vital Signs:  /74   Pulse 98   Temp 97.5 °F (36.4 °C) (Temporal)   Ht 198.1 cm (77.99\")   Wt 123 kg (271 lb)   SpO2 96%   BMI 31.32 kg/m²   Physical Exam  Vitals reviewed.   HENT:      Head: Normocephalic.      Mouth/Throat:      Mouth: Mucous membranes are moist.   Eyes:      Pupils: Pupils are equal, round, and reactive to light.   Cardiovascular:      Rate and Rhythm: Normal rate.   Abdominal:      General: Abdomen is flat.   Musculoskeletal:         General: Normal range of motion.      Cervical back: Normal range of motion.   Skin:     General: Skin is warm.      Capillary Refill: Capillary refill takes less than 2 seconds.   Neurological:      Mental Status: He is alert.              Assessment and Plan Additional age appropriate preventative wellness advice topics were discussed during today's preventative wellness exam(some topics already addressed during AWV portion of the note above):    Physical " Activity: Advised cardiovascular activity 150 minutes per week as tolerated. (example brisk walk for 30 minutes, 5 days a week).     Nutrition: Discussed nutrition plan with patient. Information shared in after visit summary. Goal is for a well balanced diet to enhance overall health.     Sexual Behavior/Sexual Safety Discussion: Information shared in after visit summary.     Injury Prevention Discussion:  Information shared in after visit summary.           Foraminal stenosis of lumbosacral region    Orders:    Ambulatory Referral to Neurosurgery    Ambulatory Referral For Acupuncture    Essential hypertension           Mixed hyperlipidemia            Polyradiculopathy         Foot drop, right               I spent 25 minutes caring for Lj on this date of service. This time includes time spent by me in the following activities:preparing for the visit, reviewing tests, obtaining and/or reviewing a separately obtained history, performing a medically appropriate examination and/or evaluation , counseling and educating the patient/family/caregiver, ordering medications, tests, or procedures, referring and communicating with other health care professionals , documenting information in the medical record, independently interpreting results and communicating that information with the patient/family/caregiver, and care coordination  Follow Up   No follow-ups on file.  Patient was given instructions and counseling regarding his condition or for health maintenance advice. Please see specific information pulled into the AVS if appropriate.

## 2025-06-16 ENCOUNTER — TELEPHONE (OUTPATIENT)
Dept: FAMILY MEDICINE CLINIC | Facility: CLINIC | Age: 75
End: 2025-06-16
Payer: MEDICARE

## 2025-06-16 NOTE — TELEPHONE ENCOUNTER
Patient called about getting another referral for PT and also wanted to speak with Dr. Cardona about something, but would not give any additional details. Please give patient a call back.

## 2025-06-16 NOTE — TELEPHONE ENCOUNTER
I spoke with the patient about this. About the referral to PT pt understood they will call him to set up an appointment

## 2025-06-27 ENCOUNTER — OFFICE VISIT (OUTPATIENT)
Dept: FAMILY MEDICINE CLINIC | Facility: CLINIC | Age: 75
End: 2025-06-27
Payer: MEDICARE

## 2025-06-27 VITALS
HEIGHT: 78 IN | HEART RATE: 106 BPM | SYSTOLIC BLOOD PRESSURE: 112 MMHG | OXYGEN SATURATION: 97 % | DIASTOLIC BLOOD PRESSURE: 72 MMHG | BODY MASS INDEX: 31.47 KG/M2 | WEIGHT: 272 LBS | TEMPERATURE: 98.5 F

## 2025-06-27 DIAGNOSIS — M21.371 FOOT DROP, RIGHT: ICD-10-CM

## 2025-06-27 DIAGNOSIS — E78.2 MIXED HYPERLIPIDEMIA: ICD-10-CM

## 2025-06-27 DIAGNOSIS — Z12.5 ENCOUNTER FOR SCREENING FOR MALIGNANT NEOPLASM OF PROSTATE: ICD-10-CM

## 2025-06-27 DIAGNOSIS — R79.9 ABNORMAL FINDING OF BLOOD CHEMISTRY, UNSPECIFIED: ICD-10-CM

## 2025-06-27 DIAGNOSIS — R93.89 ABNORMAL FINDINGS ON DIAGNOSTIC IMAGING OF OTHER SPECIFIED BODY STRUCTURES: ICD-10-CM

## 2025-06-27 DIAGNOSIS — I10 ESSENTIAL HYPERTENSION: ICD-10-CM

## 2025-06-27 DIAGNOSIS — R26.89 BALANCE DISORDER: ICD-10-CM

## 2025-06-27 DIAGNOSIS — R93.3 ABNORMAL FINDINGS ON DIAGNOSTIC IMAGING OF OTHER PARTS OF DIGESTIVE TRACT: ICD-10-CM

## 2025-06-27 DIAGNOSIS — Z74.09 IMMOBILITY: Primary | ICD-10-CM

## 2025-06-27 LAB
ALBUMIN SERPL-MCNC: 4.1 G/DL (ref 3.5–5.2)
ALBUMIN/GLOB SERPL: 1.4 G/DL
ALP SERPL-CCNC: 76 U/L (ref 39–117)
ALT SERPL W P-5'-P-CCNC: 21 U/L (ref 1–41)
ANION GAP SERPL CALCULATED.3IONS-SCNC: 12 MMOL/L (ref 5–15)
AST SERPL-CCNC: 24 U/L (ref 1–40)
BASOPHILS # BLD AUTO: 0.04 10*3/MM3 (ref 0–0.2)
BASOPHILS NFR BLD AUTO: 0.5 % (ref 0–1.5)
BILIRUB SERPL-MCNC: 0.4 MG/DL (ref 0–1.2)
BUN SERPL-MCNC: 16 MG/DL (ref 8–23)
BUN/CREAT SERPL: 18.2 (ref 7–25)
CALCIUM SPEC-SCNC: 9.6 MG/DL (ref 8.6–10.5)
CHLORIDE SERPL-SCNC: 103 MMOL/L (ref 98–107)
CHOLEST SERPL-MCNC: 131 MG/DL (ref 0–200)
CO2 SERPL-SCNC: 24 MMOL/L (ref 22–29)
CREAT SERPL-MCNC: 0.88 MG/DL (ref 0.76–1.27)
CRP SERPL-MCNC: <0.3 MG/DL (ref 0–0.5)
DEPRECATED RDW RBC AUTO: 42.4 FL (ref 37–54)
EGFRCR SERPLBLD CKD-EPI 2021: 90.8 ML/MIN/1.73
EOSINOPHIL # BLD AUTO: 0.07 10*3/MM3 (ref 0–0.4)
EOSINOPHIL NFR BLD AUTO: 0.9 % (ref 0.3–6.2)
ERYTHROCYTE [DISTWIDTH] IN BLOOD BY AUTOMATED COUNT: 13.1 % (ref 12.3–15.4)
GLOBULIN UR ELPH-MCNC: 2.9 GM/DL
GLUCOSE SERPL-MCNC: 115 MG/DL (ref 65–99)
HBA1C MFR BLD: 6.4 % (ref 4.8–5.6)
HCT VFR BLD AUTO: 46.3 % (ref 37.5–51)
HDLC SERPL-MCNC: 53 MG/DL (ref 40–60)
HGB BLD-MCNC: 15.5 G/DL (ref 13–17.7)
IMM GRANULOCYTES # BLD AUTO: 0.03 10*3/MM3 (ref 0–0.05)
IMM GRANULOCYTES NFR BLD AUTO: 0.4 % (ref 0–0.5)
LDLC SERPL CALC-MCNC: 61 MG/DL (ref 0–100)
LDLC/HDLC SERPL: 1.13 {RATIO}
LYMPHOCYTES # BLD AUTO: 1.12 10*3/MM3 (ref 0.7–3.1)
LYMPHOCYTES NFR BLD AUTO: 14.1 % (ref 19.6–45.3)
MCH RBC QN AUTO: 29.6 PG (ref 26.6–33)
MCHC RBC AUTO-ENTMCNC: 33.5 G/DL (ref 31.5–35.7)
MCV RBC AUTO: 88.4 FL (ref 79–97)
MONOCYTES # BLD AUTO: 0.63 10*3/MM3 (ref 0.1–0.9)
MONOCYTES NFR BLD AUTO: 8 % (ref 5–12)
NEUTROPHILS NFR BLD AUTO: 6.03 10*3/MM3 (ref 1.7–7)
NEUTROPHILS NFR BLD AUTO: 76.1 % (ref 42.7–76)
NRBC BLD AUTO-RTO: 0 /100 WBC (ref 0–0.2)
PLATELET # BLD AUTO: 221 10*3/MM3 (ref 140–450)
PMV BLD AUTO: 10.1 FL (ref 6–12)
POTASSIUM SERPL-SCNC: 4.7 MMOL/L (ref 3.5–5.2)
PROT SERPL-MCNC: 7 G/DL (ref 6–8.5)
RBC # BLD AUTO: 5.24 10*6/MM3 (ref 4.14–5.8)
SODIUM SERPL-SCNC: 139 MMOL/L (ref 136–145)
TRIGL SERPL-MCNC: 91 MG/DL (ref 0–150)
TSH SERPL DL<=0.05 MIU/L-ACNC: 2.19 UIU/ML (ref 0.27–4.2)
VLDLC SERPL-MCNC: 17 MG/DL (ref 5–40)
WBC NRBC COR # BLD AUTO: 7.92 10*3/MM3 (ref 3.4–10.8)

## 2025-06-27 PROCEDURE — 83036 HEMOGLOBIN GLYCOSYLATED A1C: CPT | Performed by: STUDENT IN AN ORGANIZED HEALTH CARE EDUCATION/TRAINING PROGRAM

## 2025-06-27 PROCEDURE — 3074F SYST BP LT 130 MM HG: CPT | Performed by: STUDENT IN AN ORGANIZED HEALTH CARE EDUCATION/TRAINING PROGRAM

## 2025-06-27 PROCEDURE — 85025 COMPLETE CBC W/AUTO DIFF WBC: CPT | Performed by: STUDENT IN AN ORGANIZED HEALTH CARE EDUCATION/TRAINING PROGRAM

## 2025-06-27 PROCEDURE — 1160F RVW MEDS BY RX/DR IN RCRD: CPT | Performed by: STUDENT IN AN ORGANIZED HEALTH CARE EDUCATION/TRAINING PROGRAM

## 2025-06-27 PROCEDURE — 36415 COLL VENOUS BLD VENIPUNCTURE: CPT | Performed by: STUDENT IN AN ORGANIZED HEALTH CARE EDUCATION/TRAINING PROGRAM

## 2025-06-27 PROCEDURE — 3078F DIAST BP <80 MM HG: CPT | Performed by: STUDENT IN AN ORGANIZED HEALTH CARE EDUCATION/TRAINING PROGRAM

## 2025-06-27 PROCEDURE — 80053 COMPREHEN METABOLIC PANEL: CPT | Performed by: STUDENT IN AN ORGANIZED HEALTH CARE EDUCATION/TRAINING PROGRAM

## 2025-06-27 PROCEDURE — 86140 C-REACTIVE PROTEIN: CPT | Performed by: STUDENT IN AN ORGANIZED HEALTH CARE EDUCATION/TRAINING PROGRAM

## 2025-06-27 PROCEDURE — 1126F AMNT PAIN NOTED NONE PRSNT: CPT | Performed by: STUDENT IN AN ORGANIZED HEALTH CARE EDUCATION/TRAINING PROGRAM

## 2025-06-27 PROCEDURE — 80061 LIPID PANEL: CPT | Performed by: STUDENT IN AN ORGANIZED HEALTH CARE EDUCATION/TRAINING PROGRAM

## 2025-06-27 PROCEDURE — 1159F MED LIST DOCD IN RCRD: CPT | Performed by: STUDENT IN AN ORGANIZED HEALTH CARE EDUCATION/TRAINING PROGRAM

## 2025-06-27 PROCEDURE — 99214 OFFICE O/P EST MOD 30 MIN: CPT | Performed by: STUDENT IN AN ORGANIZED HEALTH CARE EDUCATION/TRAINING PROGRAM

## 2025-06-27 PROCEDURE — 84443 ASSAY THYROID STIM HORMONE: CPT | Performed by: STUDENT IN AN ORGANIZED HEALTH CARE EDUCATION/TRAINING PROGRAM

## 2025-06-27 RX ORDER — SEMAGLUTIDE 0.25 MG/.5ML
0.25 INJECTION, SOLUTION SUBCUTANEOUS WEEKLY
Qty: 3 ML | Refills: 0 | Status: SHIPPED | OUTPATIENT
Start: 2025-06-27 | End: 2025-06-27

## 2025-06-27 NOTE — PROGRESS NOTES
"Chief Complaint  Weight Loss (WANTS TO TALK ABOUT WEIGHT LOSS )    Subjective      Lj Crenshaw is a 75 y.o. male who presents to [facility]     Patient comes for a follow up of comorbidities.   Walks with a walker. He might benefit from continues physical therapy. Patient interested in weight loss. For him weight loss migth be beneficial. He is interested in GLP1 like meds. Patient educated about medications. He would like to think about it and let us know if he would like to start this medication class. On the other hand, HTN is controlled and HLD controlled.     Objective   Vital Signs:   Vitals:    06/27/25 1414   BP: 112/72   Pulse: 106   Temp: 98.5 °F (36.9 °C)   TempSrc: Temporal   SpO2: 97%   Weight: 123 kg (272 lb)   Height: 198.1 cm (77.99\")     Body mass index is 31.44 kg/m².    Wt Readings from Last 3 Encounters:   06/27/25 123 kg (272 lb)   05/07/25 123 kg (271 lb)   02/10/25 123 kg (271 lb)     BP Readings from Last 3 Encounters:   06/27/25 112/72   05/07/25 117/74   02/10/25 107/68       Health Maintenance   Topic Date Due    COVID-19 Vaccine (3 - 2024-25 season) 09/01/2024    INFLUENZA VACCINE  07/01/2025    ANNUAL WELLNESS VISIT  05/07/2026    LIPID PANEL  06/27/2026    COLORECTAL CANCER SCREENING  07/11/2027    TDAP/TD VACCINES (2 - Td or Tdap) 09/18/2033    HEPATITIS C SCREENING  Completed    Pneumococcal Vaccine 50+  Completed    AAA SCREEN ONCE  Completed    ZOSTER VACCINE  Completed       Physical Exam  Constitutional:       Comments: Walks with a walker     HENT:      Mouth/Throat:      Mouth: Mucous membranes are moist.   Neurological:      General: No focal deficit present.      Mental Status: He is alert and oriented to person, place, and time.            Assessment & Plan  Immobility  Difficulty with mobility  He might benefit from continues Physical therapy   Orders:    TSH    Comprehensive Metabolic Panel    Lipid Panel    CBC & Differential    " Hemoglobin A1c    PSA SCREENING; Future    C-reactive protein; Future    Abnormal findings on diagnostic imaging of other specified body structures    Orders:    TSH    Abnormal findings on diagnostic imaging of other parts of digestive tract    Orders:    Lipid Panel    Urinalysis With Microscopic - Urine, Clean Catch; Future    Abnormal finding of blood chemistry, unspecified    Orders:    CBC & Differential    Hemoglobin A1c    Encounter for screening for malignant neoplasm of prostate    Orders:    PSA SCREENING; Future    Essential hypertension  Hypertension is stable and controlled  Continue current treatment regimen.  Blood pressure will be reassessed in 6 months.         Mixed hyperlipidemia   Lipid abnormalities are stable    Plan:  Continue same medication/s without change.      Discussed medication dosage, use, side effects, and goals of treatment in detail.    Counseled patient on lifestyle modifications to help control hyperlipidemia.     Patient Treatment Goals:   LDL goal is less than 70    Followup in 6 months.         Balance disorder         Foot drop, right                      I spent 25 minutes caring for Lj on this date of service. This time includes time spent by me in the following activities:preparing for the visit, reviewing tests, obtaining and/or reviewing a separately obtained history, performing a medically appropriate examination and/or evaluation , counseling and educating the patient/family/caregiver, ordering medications, tests, or procedures, referring and communicating with other health care professionals , documenting information in the medical record, independently interpreting results and communicating that information with the patient/family/caregiver, and care coordination  FOLLOW UP  Return in about 6 months (around 12/27/2025).  Patient was given instructions and counseling regarding his condition or for health maintenance advice. Please see specific information pulled  into the AVS if appropriate.       Andrzej Ledesma MD  06/30/25  08:07 EDT    CURRENT & DISCONTINUED MEDICATIONS  Current Outpatient Medications   Medication Instructions    amoxicillin (AMOXIL) 500 MG capsule     Cyanocobalamin 1000 MCG sublingual tablet 1 tablet, Sublingual, Daily    ketorolac (ACULAR) 0.5 % ophthalmic solution No dose, route, or frequency recorded.    lisinopril-hydrochlorothiazide (PRINZIDE,ZESTORETIC) 10-12.5 MG per tablet 1 tablet, Oral, Daily    prednisoLONE acetate (PRED FORTE) 1 % ophthalmic suspension 1 drop, 4 Times Daily    rosuvastatin (CRESTOR) 40 mg, Oral, Daily, generic       Medications Discontinued During This Encounter   Medication Reason    Semaglutide-Weight Management (Wegovy) 0.25 MG/0.5ML solution auto-injector

## 2025-06-30 ENCOUNTER — TELEPHONE (OUTPATIENT)
Dept: FAMILY MEDICINE CLINIC | Facility: CLINIC | Age: 75
End: 2025-06-30

## 2025-06-30 NOTE — TELEPHONE ENCOUNTER
"    Caller: Lj Crenshaw \"Poli\"    Relationship: Self    Best call back number: [phone]    Caller requesting test results: SELF    What test was performed: BLOOD WORK     When was the test performed: 06.27.2025      Additional notes: INFORMED PATIENT RESULTS HAVE NOT BEEN REVIEWED BY PROVIDER AND HE WILL RECEIVE A CALL ONCE THE RESULTS HAVE BEEN REVIEWED.     "

## 2025-07-15 ENCOUNTER — TREATMENT (OUTPATIENT)
Dept: PHYSICAL THERAPY | Facility: CLINIC | Age: 75
End: 2025-07-15
Payer: MEDICARE

## 2025-07-15 DIAGNOSIS — Z91.81 AT RISK FOR FALLS: ICD-10-CM

## 2025-07-15 DIAGNOSIS — R26.2 DIFFICULTY WALKING: ICD-10-CM

## 2025-07-15 DIAGNOSIS — R29.898 WEAKNESS OF BOTH LOWER EXTREMITIES: Primary | ICD-10-CM

## 2025-07-15 PROCEDURE — 97535 SELF CARE MNGMENT TRAINING: CPT

## 2025-07-15 PROCEDURE — 97530 THERAPEUTIC ACTIVITIES: CPT

## 2025-07-15 PROCEDURE — 97110 THERAPEUTIC EXERCISES: CPT

## 2025-07-15 PROCEDURE — 97161 PT EVAL LOW COMPLEX 20 MIN: CPT

## 2025-07-15 NOTE — PROGRESS NOTES
Physical Therapy Initial Evaluation and Plan of Care                       Patient: Lj Crenshaw   : 1950  Diagnosis/ICD-10 Code:  Weakness of both lower extremities [R29.898]  Referring practitioner: Andrzej Cardona, *  Date of Initial Visit: 7/15/2025  Today's Date: 7/15/2025  Patient seen for 1 sessions           Subjective Questionnaire: Oswestry: 8      Subjective Evaluation    History of Present Illness  Mechanism of injury: Patient reports he may be slower than last he was in, not necessarily weaker. He is scheduled for a neurology consult 10/13/25. He denies any significant medical changes since last visit. He is still struggling with transferring from lower heights, ambulating, and navigating declines. He states the onset of weakness initially was after COVID vaccination.            Objective   See Exercise, Manual, and Modality Logs for complete treatment.   Functional Assessment      Comments   TUG: not tested per patient request     6 minute walk test: 439 feet with use of rolator and B AFO's         Strength     Left Hip   Planes of Motion   Flexion: 2+  Abduction: 4   Adduction: 2-     Right Hip   Planes of Motion   Flexion: 2+  Abduction: 4  Adduction: 2-     Left Knee   Flexion: 3+  Extension:3+     Right Knee   Flexion: 3+  Extension: 3+     Left Ankle/Foot   Dorsiflexion: 0     Right Ankle/Foot   Dorsiflexion: 0        Balance Testing:  Feet apart EO: 23 seconds (B AFO's)  Feet together EO: 7 seconds (B AFO's)  Assessment & Plan       Assessment  Impairments: abnormal coordination, abnormal gait, abnormal muscle firing, abnormal muscle tone, abnormal or restricted ROM, activity intolerance, impaired balance, lacks appropriate home exercise program and safety issue   Functional limitations: carrying objects, lifting, walking, moving in bed, standing, stooping, reaching overhead and unable to perform repetitive tasks   Assessment details: The patient presents to physical  99.7 therapy with complaints of balance deficits and lower extremity weakness. His signs/symptoms appear consistent with a neuromuscular condition based on the onset and lack of GAURAV, However, he hasn't been formally diagnosed with any specific condition. He has been known to this clinic and has within the past 3 months has been discharged due to lack of progress. He would benefit from skilled physical therapy as described below to address these limitations and allow the patient to return to his prior level of function. If symptoms don't improve over the next month, he would be referred to neurology for a second opinion.  Prognosis: good    Goals  Plan Goals: Plan Goals:    1. Mobility: Walking/Moving Around Functional Limitation                               LTG 1: 12 weeks:  The patient will demonstrate TUG score <16 seconds for reduced fall risk.                           STATUS:  New              STG 1a: 6 weeks:  The patient will demonstrate TUG score <20 seconds for reduced fall risk.                           STATUS:  New        2. The patient has limited strength of the B hips.              LTG 2: 12 weeks:  The patient will demonstrate 4 /5 strength for hip flexion, abduction, external rotation, and internal rotation in order to demonstrate improved shoulder stability.                          STATUS: New              STG 2a: 6 weeks:  The patient will demonstrate 4-/5 strength for B hip flexion, abduction, and extension in order to improve hip stability.                          STATUS:  New                3. The patient has difficulty with balance.               LTG 3: 12 weeks: The patient will hold the romberg position with eyes open for 20 seconds in order to improve balance and decrease risk of falling.                           STATUS: New              STG 3a: 6 weeks: The patient will hold the romberg position with eyes open for 10 seconds in order to improve balance and decrease risk of falling.                            STATUS: New     4. The patient has difficulty with endurance.               LTG 4: 12 weeks: The patient will be able to ambulate 750 with rollator in 6 minute walk test to decrease his risk for falling in community outings.                           STATUS: New              STG 4a: 6 weeks: The patient will be able to ambulate 600 with rollator in 6 minute walk test to decrease his risk for falling in community outings.                           STATUS: New      Plan  Therapy options: will be seen for skilled therapy services  Planned modality interventions: cryotherapy, dry needling, electrical stimulation/Russian stimulation, traction and TENS  Planned therapy interventions: abdominal trunk stabilization, ADL retraining, balance/weight-bearing training, body mechanics training, fine motor coordination training, flexibility, functional ROM exercises, gait training, home exercise program, IADL retraining, transfer training, stretching, strengthening, therapeutic activities, spinal/joint mobilization, soft tissue mobilization, postural training, neuromuscular re-education, motor coordination training, manual therapy and joint mobilization  Frequency: 2x week  Duration in weeks: 12  Treatment plan discussed with: patient      Visit Diagnoses:    ICD-10-CM ICD-9-CM   1. Weakness of both lower extremities  R29.898 729.89   2. Difficulty walking  R26.2 719.7   3. At risk for falls  Z91.81 V15.88       History # of Personal Factors and/or Comorbidities: MODERATE (1-2)  Examination of Body System(s): # of elements: LOW (1-2)  Clinical Presentation: STABLE   Clinical Decision Making: LOW       Timed:         Manual Therapy:    0     mins  52586;     Therapeutic Exercise:    20     mins  82521;     Neuromuscular Reyna:    0    mins  92546;    Therapeutic Activity:     10     mins  80892;     Gait Trainin     mins  93395;     Ultrasound:     0     mins  12741;    Ionto                               0     mins   14811  Self Care                       10     mins   75690  Canalith Repos    0     mins 97352      Un-Timed:  Electrical Stimulation:    0     mins  94529 ( );  Dry Needling     0     mins self-pay  Traction     0     mins 12191  Low Eval     20     Mins  59038  Mod Eval     0     Mins  94893  High Eval                       0     Mins  90744  Re-Eval                           0    mins  09497    Timed Treatment:   40   mins   Total Treatment:     60   mins    PT SIGNATURE: Arun Juarez, PT    Electronically signed 7/15/2025    [place] License: PT - [id number]      Initial Certification  Certification Period: 7/15/2025 thru 10/12/2025  I certify that the therapy services are furnished while this patient is under my care.  The services outlined above are required by this patient, and will be reviewed every 90 days.     PHYSICIAN: Andrzej Cardona MD  NPI: [id number]      DATE:     Please sign and return via fax to [phone]. Thank you, [facility].

## 2025-07-22 ENCOUNTER — TREATMENT (OUTPATIENT)
Dept: PHYSICAL THERAPY | Facility: CLINIC | Age: 75
End: 2025-07-22
Payer: MEDICARE

## 2025-07-22 DIAGNOSIS — R26.2 DIFFICULTY WALKING: ICD-10-CM

## 2025-07-22 DIAGNOSIS — R29.898 WEAKNESS OF BOTH LOWER EXTREMITIES: Primary | ICD-10-CM

## 2025-07-22 DIAGNOSIS — Z91.81 AT RISK FOR FALLS: ICD-10-CM

## 2025-07-22 PROCEDURE — 97112 NEUROMUSCULAR REEDUCATION: CPT

## 2025-07-22 PROCEDURE — 97110 THERAPEUTIC EXERCISES: CPT

## 2025-07-23 NOTE — PROGRESS NOTES
Physical Therapy Daily Treatment Note                      [facility] [address] [place] [address]    Patient: Lj Crenshaw   : 1950  Diagnosis/ICD-10 Code:  Weakness of both lower extremities [R29.898]  Referring practitioner: Andrzej Cardona, *  Date of Initial Visit: Type: THERAPY  Noted: 7/15/2025  Today's Date: 2025  Patient seen for 2 sessions           Subjective   Pt reports he does not sit in seats that are lower than his knees due to not being able to stand up.  Pt states he has not been doing much at home and is weak.      Objective   See Exercise, Manual, and Modality Logs for complete treatment.     Assessment/Plan  Presents with rollater, flexed posture due to significant WB through Ue's.  Encouraging VCs provided throughout treatment for pt to decrease use of UE to move LE's and to participate in as much of the therex as possible.  Multiple times pt states they could not perform the therex, after VCs pt was able to perform the therex.  Overall B LE weakness noted.  Will continue to benefit from treatments in attempt to increase BLE strength.             Continue to progress POC per pt tolerance.    Timed:  Manual Therapy:    0     mins  34646;  Therapeutic Exercise:    15     mins  11032;     Neuromuscular Reyna:   15    mins  73226;    Therapeutic Activity:     0     mins  78977;     Gait Trainin     mins  00976;         Un-timed:  Mechanical Traction      0     mins  19141  Dry Needling     0     mins self-pay  Electrical Stimulation:    0     mins  91245 ( );      Timed Treatment:   30   mins   Total Treatment:     30   mins    Zora Lou PTA  Physical Therapist Assistant    Electronically signed 2025    [place] License: PTA [id number]

## 2025-07-24 ENCOUNTER — TREATMENT (OUTPATIENT)
Dept: PHYSICAL THERAPY | Facility: CLINIC | Age: 75
End: 2025-07-24
Payer: MEDICARE

## 2025-07-24 DIAGNOSIS — Z91.81 AT RISK FOR FALLS: ICD-10-CM

## 2025-07-24 DIAGNOSIS — R26.2 DIFFICULTY WALKING: ICD-10-CM

## 2025-07-24 DIAGNOSIS — R29.898 WEAKNESS OF BOTH LOWER EXTREMITIES: Primary | ICD-10-CM

## 2025-07-24 PROCEDURE — 97110 THERAPEUTIC EXERCISES: CPT

## 2025-07-24 PROCEDURE — 97112 NEUROMUSCULAR REEDUCATION: CPT

## 2025-07-24 NOTE — PROGRESS NOTES
Physical Therapy Daily Treatment Note                      [facility] [address] [place] [address]    Patient: Lj Crenshaw   : 1950  Diagnosis/ICD-10 Code:  Weakness of both lower extremities [R29.898]  Referring practitioner: Andrzej Cardona, *  Date of Initial Visit: Type: THERAPY  Noted: 7/15/2025  Today's Date: 2025  Patient seen for 3 sessions           Subjective   Pt states he was some sore after last visit.    Objective   See Exercise, Manual, and Modality Logs for complete treatment.     Assessment/Plan  Continues to ambulate with significant WB UE.  Slight improved AROM with hip flexion and knee extension, R weaker.  Frequent VC's for pt to stand erect and bear greater amount of weight through LEs           Continue to progress POC per pt tolerance.    Timed:  Manual Therapy:    0     mins  98706;  Therapeutic Exercise:    15     mins  63689;     Neuromuscular Reyna:   10    mins  76308;    Therapeutic Activity:     0     mins  45435;     Gait Trainin     mins  13319;         Un-timed:  Mechanical Traction      0     mins  74481  Dry Needling     0     mins self-pay  Electrical Stimulation:    0     mins  94069 ( );      Timed Treatment:   25   mins   Total Treatment:     25   mins    Zora Lou PTA  Physical Therapist Assistant    Electronically signed 2025    [place] License: [id number]

## 2025-07-29 ENCOUNTER — TREATMENT (OUTPATIENT)
Dept: PHYSICAL THERAPY | Facility: CLINIC | Age: 75
End: 2025-07-29
Payer: MEDICARE

## 2025-07-29 DIAGNOSIS — R26.2 DIFFICULTY WALKING: ICD-10-CM

## 2025-07-29 DIAGNOSIS — Z91.81 AT RISK FOR FALLS: ICD-10-CM

## 2025-07-29 DIAGNOSIS — R29.898 WEAKNESS OF BOTH LOWER EXTREMITIES: Primary | ICD-10-CM

## 2025-07-29 PROCEDURE — 97110 THERAPEUTIC EXERCISES: CPT

## 2025-07-29 PROCEDURE — 97530 THERAPEUTIC ACTIVITIES: CPT

## 2025-07-29 NOTE — PROGRESS NOTES
"   Physical Therapy Daily Treatment Note                      [facility] [address] [place] [address]    Patient: Lj Crenshaw   : 1950  Diagnosis/ICD-10 Code:  Weakness of both lower extremities [R29.898]  Referring practitioner: Andrzej Cardona, *  Date of Initial Visit: Type: THERAPY  Noted: 7/15/2025  Today's Date: 2025  Patient seen for 4 sessions           Subjective   Pt reports he went to 2 family reunions over the weekend, stating he did have to walk a little more.  Pt states he didn't have any difficulty walking.  Pt reports he was able to step over a \"lip\" without difculty.    Objective   See Exercise, Manual, and Modality Logs for complete treatment.     Assessment/Plan  Presents with trunk flexion and moderate WB through UEs.  VCs for improved posture.  Improved performance of seated hip flexion, required decreased assistance this visit.             Continue to progress POC per pt tolerance.    Timed:  Manual Therapy:    0     mins  97429;  Therapeutic Exercise:    15     mins  95444;     Neuromuscular Reyna:   0    mins  43085;    Therapeutic Activity:     13     mins  59097;     Gait Trainin     mins  34238;         Un-timed:  Mechanical Traction      0     mins  26708  Dry Needling     0     mins self-pay  Electrical Stimulation:    0     mins  50762 ( );      Timed Treatment:   28   mins   Total Treatment:     28   mins    Zora Lou PTA  Physical Therapist Assistant    Electronically signed 2025    [place] License: [id number]   "

## 2025-07-31 ENCOUNTER — TREATMENT (OUTPATIENT)
Dept: PHYSICAL THERAPY | Facility: CLINIC | Age: 75
End: 2025-07-31
Payer: MEDICARE

## 2025-07-31 DIAGNOSIS — R29.898 WEAKNESS OF BOTH LOWER EXTREMITIES: Primary | ICD-10-CM

## 2025-07-31 DIAGNOSIS — Z91.81 AT RISK FOR FALLS: ICD-10-CM

## 2025-07-31 DIAGNOSIS — R26.2 DIFFICULTY WALKING: ICD-10-CM

## 2025-07-31 PROCEDURE — 97530 THERAPEUTIC ACTIVITIES: CPT

## 2025-07-31 PROCEDURE — 97110 THERAPEUTIC EXERCISES: CPT

## 2025-07-31 NOTE — PROGRESS NOTES
Physical Therapy Daily Treatment Note                      [facility] [address] [place] [address]    Patient: Lj Crenshaw   : 1950  Diagnosis/ICD-10 Code:  Weakness of both lower extremities [R29.898]  Referring practitioner: Andrzej Cardona, *  Date of Initial Visit: Type: THERAPY  Noted: 7/15/2025  Today's Date: 2025  Patient seen for 5 sessions           Subjective   Pt states he is doing some exercises at home.    Objective   See Exercise, Manual, and Modality Logs for complete treatment.     Assessment/Plan  Discussed at length with pt and pt's wife importance of activity and decreased use of UE/AD's performing sit-to-stand activities, and overall increased activities at home to increase muscle strength.   Several VCs provided for correct form of therex.  Continues to demonstrate significant weakness throughout therex.  R>L         Continue to progress POC per pt tolerance.    Timed:  Manual Therapy:    0     mins  17383;  Therapeutic Exercise:    15     mins  33281;     Neuromuscular Reyna:   0    mins  90708;    Therapeutic Activity:     13     mins  13268;     Gait Trainin     mins  28126;         Un-timed:  Mechanical Traction      0    mins  94190  Dry Needling     0     mins self-pay  Electrical Stimulation:    0     mins  97828 ( );      Timed Treatment:   28   mins   Total Treatment:     28   mins    Zora Lou PTA  Physical Therapist Assistant    Electronically signed 2025    [place] License: [id number]

## 2025-08-05 ENCOUNTER — TREATMENT (OUTPATIENT)
Dept: PHYSICAL THERAPY | Facility: CLINIC | Age: 75
End: 2025-08-05
Payer: MEDICARE

## 2025-08-05 DIAGNOSIS — R29.898 WEAKNESS OF BOTH LOWER EXTREMITIES: Primary | ICD-10-CM

## 2025-08-05 DIAGNOSIS — R26.2 DIFFICULTY WALKING: ICD-10-CM

## 2025-08-05 DIAGNOSIS — Z91.81 AT RISK FOR FALLS: ICD-10-CM

## 2025-08-05 PROCEDURE — 97530 THERAPEUTIC ACTIVITIES: CPT

## 2025-08-05 PROCEDURE — 97110 THERAPEUTIC EXERCISES: CPT

## 2025-08-07 ENCOUNTER — TREATMENT (OUTPATIENT)
Dept: PHYSICAL THERAPY | Facility: CLINIC | Age: 75
End: 2025-08-07
Payer: MEDICARE

## 2025-08-07 DIAGNOSIS — Z91.81 AT RISK FOR FALLS: ICD-10-CM

## 2025-08-07 DIAGNOSIS — R29.898 WEAKNESS OF BOTH LOWER EXTREMITIES: Primary | ICD-10-CM

## 2025-08-07 DIAGNOSIS — R26.2 DIFFICULTY WALKING: ICD-10-CM

## 2025-08-07 PROCEDURE — 97110 THERAPEUTIC EXERCISES: CPT

## 2025-08-07 PROCEDURE — 97112 NEUROMUSCULAR REEDUCATION: CPT

## 2025-08-12 ENCOUNTER — TREATMENT (OUTPATIENT)
Dept: PHYSICAL THERAPY | Facility: CLINIC | Age: 75
End: 2025-08-12
Payer: MEDICARE

## 2025-08-12 DIAGNOSIS — R29.898 WEAKNESS OF BOTH LOWER EXTREMITIES: Primary | ICD-10-CM

## 2025-08-12 DIAGNOSIS — R26.2 DIFFICULTY WALKING: ICD-10-CM

## 2025-08-12 DIAGNOSIS — Z91.81 AT RISK FOR FALLS: ICD-10-CM

## 2025-08-12 PROCEDURE — 97112 NEUROMUSCULAR REEDUCATION: CPT

## 2025-08-12 PROCEDURE — 97110 THERAPEUTIC EXERCISES: CPT

## 2025-08-14 ENCOUNTER — TREATMENT (OUTPATIENT)
Dept: PHYSICAL THERAPY | Facility: CLINIC | Age: 75
End: 2025-08-14
Payer: MEDICARE

## 2025-08-14 DIAGNOSIS — Z91.81 AT RISK FOR FALLS: ICD-10-CM

## 2025-08-14 DIAGNOSIS — R26.2 DIFFICULTY WALKING: ICD-10-CM

## 2025-08-14 DIAGNOSIS — R29.898 WEAKNESS OF BOTH LOWER EXTREMITIES: Primary | ICD-10-CM

## 2025-08-14 PROCEDURE — 97530 THERAPEUTIC ACTIVITIES: CPT

## 2025-08-14 PROCEDURE — 97164 PT RE-EVAL EST PLAN CARE: CPT

## 2025-08-14 PROCEDURE — 97110 THERAPEUTIC EXERCISES: CPT
